# Patient Record
Sex: FEMALE | Race: WHITE | NOT HISPANIC OR LATINO | Employment: OTHER | URBAN - METROPOLITAN AREA
[De-identification: names, ages, dates, MRNs, and addresses within clinical notes are randomized per-mention and may not be internally consistent; named-entity substitution may affect disease eponyms.]

---

## 2017-01-06 ENCOUNTER — TELEPHONE (OUTPATIENT)
Dept: ENDOCRINOLOGY | Facility: CLINIC | Age: 62
End: 2017-01-06

## 2017-02-05 RX ORDER — PRAVASTATIN SODIUM 20 MG/1
TABLET ORAL
Qty: 90 TABLET | Refills: 2 | Status: SHIPPED | OUTPATIENT
Start: 2017-02-05 | End: 2017-10-24 | Stop reason: SDUPTHER

## 2017-03-02 RX ORDER — BISOPROLOL FUMARATE AND HYDROCHLOROTHIAZIDE 10; 6.25 MG/1; MG/1
TABLET ORAL
Refills: 0 | COMMUNITY
Start: 2016-12-18 | End: 2017-03-16

## 2017-03-03 ENCOUNTER — PATIENT OUTREACH (OUTPATIENT)
Dept: ADMINISTRATIVE | Facility: HOSPITAL | Age: 62
End: 2017-03-03

## 2017-03-16 ENCOUNTER — OFFICE VISIT (OUTPATIENT)
Dept: FAMILY MEDICINE | Facility: CLINIC | Age: 62
End: 2017-03-16
Attending: FAMILY MEDICINE
Payer: COMMERCIAL

## 2017-03-16 VITALS
SYSTOLIC BLOOD PRESSURE: 132 MMHG | OXYGEN SATURATION: 99 % | WEIGHT: 134.13 LBS | TEMPERATURE: 98 F | HEART RATE: 54 BPM | BODY MASS INDEX: 23.77 KG/M2 | HEIGHT: 63 IN | DIASTOLIC BLOOD PRESSURE: 80 MMHG

## 2017-03-16 DIAGNOSIS — Z86.79 HISTORY OF INTRACRANIAL HEMORRHAGE: ICD-10-CM

## 2017-03-16 DIAGNOSIS — I10 HTN (HYPERTENSION), BENIGN: ICD-10-CM

## 2017-03-16 DIAGNOSIS — Z01.419 ROUTINE GYNECOLOGICAL EXAMINATION: ICD-10-CM

## 2017-03-16 DIAGNOSIS — E55.9 VITAMIN D DEFICIENCY: ICD-10-CM

## 2017-03-16 DIAGNOSIS — Z87.81 HISTORY OF SKULL FRACTURE: ICD-10-CM

## 2017-03-16 DIAGNOSIS — E03.9 HYPOTHYROIDISM, UNSPECIFIED TYPE: ICD-10-CM

## 2017-03-16 DIAGNOSIS — M85.80 OSTEOPENIA: ICD-10-CM

## 2017-03-16 DIAGNOSIS — Z91.89 FRAMINGHAM CARDIAC RISK <10% IN NEXT 10 YEARS: ICD-10-CM

## 2017-03-16 DIAGNOSIS — E78.5 HYPERLIPIDEMIA, UNSPECIFIED HYPERLIPIDEMIA TYPE: ICD-10-CM

## 2017-03-16 DIAGNOSIS — Z00.00 ROUTINE GENERAL MEDICAL EXAMINATION AT A HEALTH CARE FACILITY: Primary | ICD-10-CM

## 2017-03-16 PROCEDURE — 3075F SYST BP GE 130 - 139MM HG: CPT | Mod: S$GLB,,, | Performed by: FAMILY MEDICINE

## 2017-03-16 PROCEDURE — 99396 PREV VISIT EST AGE 40-64: CPT | Mod: 25,S$GLB,, | Performed by: FAMILY MEDICINE

## 2017-03-16 PROCEDURE — 90715 TDAP VACCINE 7 YRS/> IM: CPT | Mod: S$GLB,,, | Performed by: FAMILY MEDICINE

## 2017-03-16 PROCEDURE — 99999 PR PBB SHADOW E&M-EST. PATIENT-LVL III: CPT | Mod: PBBFAC,,, | Performed by: FAMILY MEDICINE

## 2017-03-16 PROCEDURE — 90471 IMMUNIZATION ADMIN: CPT | Mod: S$GLB,,, | Performed by: FAMILY MEDICINE

## 2017-03-16 PROCEDURE — 3079F DIAST BP 80-89 MM HG: CPT | Mod: S$GLB,,, | Performed by: FAMILY MEDICINE

## 2017-03-16 NOTE — PROGRESS NOTES
Subjective:       Patient ID: Shira Vega is a 61 y.o. female.    Chief Complaint: Annual Exam    HPI     The patient presents to the office today requesting a routine periodic health examination.    Patient Active Problem List   Diagnosis    HTN (hypertension), benign    Hyperlipidemia    Hypothyroidism    History of skull fracture    History of intracranial hemorrhage    [place] cardiac risk <10% in next 10 years    Hypercalcemia    Vitamin D deficiency    Osteopenia       Past Surgical History:   Procedure Laterality Date    FOOT SURGERY      WISDOM TOOTH EXTRACTION           Current Outpatient Prescriptions:     aspirin (ASPIRIN LOW DOSE) 81 MG EC tablet, Take 1 tablet by mouth., Disp: , Rfl:     bisoprolol (ZEBETA) 10 MG tablet, Take 1 tablet (10 mg total) by mouth once daily., Disp: 30 tablet, Rfl: 7    cholecalciferol, vitamin D3, 1,000 unit capsule, Take 1 capsule (1,000 Units total) by mouth once daily., Disp: , Rfl: 0    levothyroxine (SYNTHROID) 50 MCG tablet, take 1 tablet by mouth every morning ON AN EMPTY STOMACH, Disp: 90 tablet, Rfl: 3    pravastatin (PRAVACHOL) 20 MG tablet, take 1 tablet by mouth once daily, Disp: 90 tablet, Rfl: 2    VIMPAT 150 mg Tab, , Disp: , Rfl: 0    bisoprolol-hydrochlorothiazide (ZIAC) 10-6.25 mg per tablet, , Disp: , Rfl: 0    Review of patient's allergies indicates:   Allergen Reactions    No known allergies        Family History   Problem Relation Age of Onset    Cancer Mother      Lung    Hypertension Mother     Thyroid disease Mother     Osteoarthritis Maternal Grandmother     Hypertension Maternal Grandmother     Cancer Maternal Grandfather      colon       Social History     Social History    Marital status:      Spouse name: N/A    Number of children: 1    Years of education: N/A     Occupational History     [employer]     retired     Social History Main Topics    Smoking status: Former Smoker     Types: Cigarettes     Smokeless tobacco: Never Used      Comment: smoked when in college, a couple cigs. when she went out    Alcohol use 3.5 oz/week     7 Standard drinks or equivalent per week      Comment: drinks glass of wine or cocktail in the evening    Drug use: No    Sexual activity: Yes     Partners: Male      Comment:      Other Topics Concern    Not on file     Social History Narrative    The patient does exercise regularly (TIW: spinning, light resistance).      Rates diet as good.      She is not satisfied with weight.     with 1 child    She does drink at least 1/2 gallon water daily.    She drinks 0 coffee/tea/caffeine-containing soft drinks daily.    Total sleep time at night is 8-10 hours.    She no longer work- retired.    She does wear seat belts.    Hobbies include none.       OB History      Para Term  AB TAB SAB Ectopic Multiple Living    1 1                Obstetric Comments    Menarche age 11.  LMP age 53.  First child born age 33.  History of abnormal PAP smear: NO.  History of abnormal mammogram: NO.  History of sexually transmitted disease:  NO    Breast Cancer Risk:  5-year Risk: Patient 2.1%, Average1.9%  Lifetime Risk: P atient 9.4%, Average 8.6%              Patient Care Team:  Justen Barragan Jr., MD as PCP - General (Family Medicine)    Review of Systems   Constitutional: Negative for fatigue and unexpected weight change.   HENT: Negative for ear discharge, ear pain, hearing loss, tinnitus and voice change.    Respiratory: Negative for cough and shortness of breath.    Cardiovascular: Negative for chest pain, palpitations and leg swelling.   Gastrointestinal: Negative for abdominal pain, blood in stool, constipation, diarrhea, nausea and vomiting.   Genitourinary: Negative for difficulty urinating, dyspareunia, dysuria, frequency and hematuria.   Musculoskeletal: Positive for arthralgias (left ankle pain/edema). Negative for back pain and myalgias.   Skin: Negative for rash.  "  Neurological: Negative for dizziness, seizures, weakness, light-headedness and headaches.   Hematological: Does not bruise/bleed easily.   Psychiatric/Behavioral: Negative for dysphoric mood and sleep disturbance. The patient is not nervous/anxious.          Objective:      Physical Exam   Constitutional: She is oriented to person, place, and time. She appears well-developed and well-nourished. She is cooperative.   HENT:   Head: Normocephalic and atraumatic.   Nose: Nose normal.   Mouth/Throat: Oropharynx is clear and moist and mucous membranes are normal.   Eyes: Conjunctivae are normal. No scleral icterus.   Neck: Neck supple. No JVD present. Carotid bruit is not present. No thyromegaly present.   Cardiovascular: Normal rate, regular rhythm, normal heart sounds and normal pulses.  Exam reveals no gallop and no friction rub.    No murmur heard.  Pulmonary/Chest: Effort normal and breath sounds normal. She has no wheezes. She has no rhonchi. She has no rales.   Abdominal: Soft. Bowel sounds are normal. She exhibits no distension and no mass. There is no splenomegaly or hepatomegaly. There is no tenderness.   Musculoskeletal: Normal range of motion. She exhibits no edema or tenderness.   Lymphadenopathy:     She has no cervical adenopathy.     She has no axillary adenopathy.   Neurological: She is alert and oriented to person, place, and time. She has normal strength and normal reflexes. No cranial nerve deficit or sensory deficit.   Skin: Skin is warm and dry.   Psychiatric: She has a normal mood and affect. Her speech is normal.   Vitals reviewed.        Assessment:       No diagnosis found.    Plan:           Discussed routine examinations and screenings.   Refer to Dr. Reyes for GYN exam.   3D mammogram (given prior exams, and need for diagnostic mammogram).   TdaP today.        "This note will not be shared with the patient."  "

## 2017-03-16 NOTE — MR AVS SNAPSHOT
[facility]  [address]  [address]  [facility] [place] [facility] [address]  Phone: [phone]                  Shira Vega   3/16/2017 8:00 AM   Office Visit    Description:  Female : 1955   Provider:  Justen Barragan Jr., MD   Department:  [facility]           Reason for Visit     Annual Exam           Diagnoses this Visit        Comments    Routine general medical examination at a health care facility    -  Primary     HTN (hypertension), benign         Hyperlipidemia, unspecified hyperlipidemia type         Hypothyroidism, unspecified type         Osteopenia         Vitamin D deficiency         [place] cardiac risk <10% in next 10 years         History of skull fracture         History of intracranial hemorrhage         Routine gynecological examination                To Do List           Future Appointments        Provider Department Dept Phone    3/22/2017 7:30 AM LAB, [facility] [phone]    2017 10:00 AM LAB, [facility] - [facility] [phone]      Goals (5 Years of Data)     None      [facility]     [facility] -  Assistance  Registered nurses in the [facility] provide clinical advisement, health education, appointment booking, and other advisory services.  Call for this free service at [phone].             Medications           Message regarding Medications     Verify the changes and/or additions to your medication regime listed below are the same as discussed with your clinician today.  If any of these changes or additions are incorrect, please notify your healthcare provider.        STOP taking these medications     bisoprolol-hydrochlorothiazide (ZIAC) 10-6.25 mg per tablet            Verify that the below list of medications is an accurate representation of the medications you are currently taking.  If none reported, the list may be blank. If incorrect, please  "contact your healthcare provider. Carry this list with you in case of emergency.           Current Medications     aspirin (ASPIRIN LOW DOSE) 81 MG EC tablet Take 1 tablet by mouth.    bisoprolol (ZEBETA) 10 MG tablet Take 1 tablet (10 mg total) by mouth once daily.    cholecalciferol, vitamin D3, 1,000 unit capsule Take 1 capsule (1,000 Units total) by mouth once daily.    levothyroxine (SYNTHROID) 50 MCG tablet take 1 tablet by mouth every morning ON AN EMPTY STOMACH    pravastatin (PRAVACHOL) 20 MG tablet take 1 tablet by mouth once daily    VIMPAT 150 mg Tab            Clinical Reference Information           Your Vitals Were     BP Pulse Temp Height Weight SpO2    132/80 (BP Location: Left arm, Patient Position: Sitting, BP Method: Manual) 54 97.8 °F (36.6 °C) (Oral) 5' 3" (1.6 m) 60.8 kg (134 lb 1.6 oz) 99%    BMI                23.75 kg/m2          Blood Pressure          Most Recent Value    BP  132/80      Allergies as of 3/16/2017     No Known Allergies      Immunizations Administered on Date of Encounter - 3/16/2017     Name Date Dose VIS Date Route    TDAP 3/16/2017 0.5 mL 2/24/2015 Intramuscular      Orders Placed During Today's Visit      Normal Orders This Visit    Ambulatory referral to Obstetrics / Gynecology     Tdap Vaccine     Future Labs/Procedures Expected by Expires    CBC auto differential  3/16/2017 3/17/2018    Comprehensive metabolic panel  3/16/2017 3/17/2018    Lipoprotein Analysis, by NMR  3/16/2017 5/15/2018    T4, free  3/16/2017 3/16/2018    TSH  3/16/2017 3/16/2018    Vitamin D  3/16/2017 3/16/2018      Language Assistance Services     ATTENTION: Language assistance services are available, free of charge. Please call [phone].      ATENCIÓN: Si habla oseiañol, tiene a garcia disposición servicios gratuitos de asistencia lingüística. Llame al [phone].     CHÚ Ý: N?u b?n nói Ti?ng Vi?t, có các d?ch v? h? tr? ngôn ng? mi?n phí dành cho b?n. G?i s? [phone].         [facility] complies with applicable Federal civil rights laws and does not discriminate on the basis of race, color, national origin, age, disability, or sex.
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
## 2017-03-16 NOTE — PROGRESS NOTES
Patient was given Tdap IM  in Left  Deltoid as per orders from Dr. Barragan. Aseptic tech used and pt tolerated well. Pt was monitored for 15 mins with no reaction noted.

## 2017-03-22 ENCOUNTER — LAB VISIT (OUTPATIENT)
Dept: LAB | Facility: HOSPITAL | Age: 62
End: 2017-03-22
Attending: FAMILY MEDICINE
Payer: COMMERCIAL

## 2017-03-22 DIAGNOSIS — Z91.89 FRAMINGHAM CARDIAC RISK <10% IN NEXT 10 YEARS: ICD-10-CM

## 2017-03-22 DIAGNOSIS — I10 HTN (HYPERTENSION), BENIGN: ICD-10-CM

## 2017-03-22 DIAGNOSIS — E55.9 VITAMIN D DEFICIENCY: ICD-10-CM

## 2017-03-22 DIAGNOSIS — Z87.81 HISTORY OF SKULL FRACTURE: ICD-10-CM

## 2017-03-22 DIAGNOSIS — E03.9 HYPOTHYROIDISM, UNSPECIFIED TYPE: ICD-10-CM

## 2017-03-22 DIAGNOSIS — E78.5 HYPERLIPIDEMIA, UNSPECIFIED HYPERLIPIDEMIA TYPE: ICD-10-CM

## 2017-03-22 DIAGNOSIS — M85.80 OSTEOPENIA: ICD-10-CM

## 2017-03-22 DIAGNOSIS — Z86.79 HISTORY OF INTRACRANIAL HEMORRHAGE: ICD-10-CM

## 2017-03-22 LAB
25(OH)D3+25(OH)D2 SERPL-MCNC: 63 NG/ML
ALBUMIN SERPL BCP-MCNC: 3.8 G/DL
ALP SERPL-CCNC: 92 U/L
ALT SERPL W/O P-5'-P-CCNC: 12 U/L
ANION GAP SERPL CALC-SCNC: 11 MMOL/L
AST SERPL-CCNC: 22 U/L
BASOPHILS # BLD AUTO: 0.06 K/UL
BASOPHILS NFR BLD: 0.9 %
BILIRUB SERPL-MCNC: 0.4 MG/DL
BUN SERPL-MCNC: 17 MG/DL
CALCIUM SERPL-MCNC: 10.2 MG/DL
CHLORIDE SERPL-SCNC: 106 MMOL/L
CO2 SERPL-SCNC: 26 MMOL/L
CREAT SERPL-MCNC: 0.9 MG/DL
DIFFERENTIAL METHOD: ABNORMAL
EOSINOPHIL # BLD AUTO: 0.3 K/UL
EOSINOPHIL NFR BLD: 5.1 %
ERYTHROCYTE [DISTWIDTH] IN BLOOD BY AUTOMATED COUNT: 13.1 %
EST. GFR  (AFRICAN AMERICAN): >60 ML/MIN/1.73 M^2
EST. GFR  (NON AFRICAN AMERICAN): >60 ML/MIN/1.73 M^2
GLUCOSE SERPL-MCNC: 78 MG/DL
HCT VFR BLD AUTO: 40.1 %
HGB BLD-MCNC: 12.9 G/DL
LYMPHOCYTES # BLD AUTO: 2.2 K/UL
LYMPHOCYTES NFR BLD: 33.5 %
MCH RBC QN AUTO: 31.9 PG
MCHC RBC AUTO-ENTMCNC: 32.2 %
MCV RBC AUTO: 99 FL
MONOCYTES # BLD AUTO: 0.7 K/UL
MONOCYTES NFR BLD: 11.1 %
NEUTROPHILS # BLD AUTO: 3.3 K/UL
NEUTROPHILS NFR BLD: 49.2 %
PLATELET # BLD AUTO: 217 K/UL
PMV BLD AUTO: 10.2 FL
POTASSIUM SERPL-SCNC: 4 MMOL/L
PROT SERPL-MCNC: 7.9 G/DL
RBC # BLD AUTO: 4.04 M/UL
SODIUM SERPL-SCNC: 143 MMOL/L
T4 FREE SERPL-MCNC: 0.94 NG/DL
TSH SERPL DL<=0.005 MIU/L-ACNC: 1.53 UIU/ML
WBC # BLD AUTO: 6.66 K/UL

## 2017-03-22 PROCEDURE — 84439 ASSAY OF FREE THYROXINE: CPT

## 2017-03-22 PROCEDURE — 85025 COMPLETE CBC W/AUTO DIFF WBC: CPT

## 2017-03-22 PROCEDURE — 84443 ASSAY THYROID STIM HORMONE: CPT

## 2017-03-22 PROCEDURE — 80061 LIPID PANEL: CPT

## 2017-03-22 PROCEDURE — 36415 COLL VENOUS BLD VENIPUNCTURE: CPT | Mod: PO

## 2017-03-22 PROCEDURE — 82306 VITAMIN D 25 HYDROXY: CPT

## 2017-03-22 PROCEDURE — 80053 COMPREHEN METABOLIC PANEL: CPT

## 2017-03-26 ENCOUNTER — PATIENT MESSAGE (OUTPATIENT)
Dept: FAMILY MEDICINE | Facility: CLINIC | Age: 62
End: 2017-03-26

## 2017-03-26 LAB
CHOLEST SERPL-MCNC: 224 MG/DL (ref 100–199)
HDL SERPL QN: 60 NM
HDL SERPL QN: 9.4 NM
HDL SERPL-SCNC: 41.2 UMOL/L
HDLC SERPL-MCNC: 60 MG/DL
HLD.LARGE SERPL-SCNC: 7.7 UMOL/L
LDL MED SERPL QN: 21.8 NM
LDL SERPL QN: 21.8 NM
LDL SERPL-SCNC: 1351 NMOL/L
LDL SMALL SERPL-SCNC: 411 NMOL/L
LDL SMALL SERPL-SCNC: 411 NMOL/L
LDLC SERPL CALC-MCNC: 129 MG/DL (ref 0–99)
TRIGL SERPL-MCNC: 176 MG/DL (ref 0–149)
VLDL LARGE SERPL-SCNC: 8.7 NMOL/L
VLDL SERPL QN: 61.6 NM

## 2017-03-27 ENCOUNTER — PATIENT MESSAGE (OUTPATIENT)
Dept: FAMILY MEDICINE | Facility: CLINIC | Age: 62
End: 2017-03-27

## 2017-05-12 ENCOUNTER — OFFICE VISIT (OUTPATIENT)
Dept: OBSTETRICS AND GYNECOLOGY | Facility: CLINIC | Age: 62
End: 2017-05-12
Payer: COMMERCIAL

## 2017-05-12 VITALS
BODY MASS INDEX: 23.6 KG/M2 | SYSTOLIC BLOOD PRESSURE: 130 MMHG | WEIGHT: 133.19 LBS | DIASTOLIC BLOOD PRESSURE: 70 MMHG | HEIGHT: 63 IN

## 2017-05-12 DIAGNOSIS — Z12.4 PAP SMEAR FOR CERVICAL CANCER SCREENING: ICD-10-CM

## 2017-05-12 DIAGNOSIS — Z01.419 ENCOUNTER FOR GYNECOLOGICAL EXAMINATION: Primary | ICD-10-CM

## 2017-05-12 PROCEDURE — 3075F SYST BP GE 130 - 139MM HG: CPT | Mod: S$GLB,,, | Performed by: OBSTETRICS & GYNECOLOGY

## 2017-05-12 PROCEDURE — 3078F DIAST BP <80 MM HG: CPT | Mod: S$GLB,,, | Performed by: OBSTETRICS & GYNECOLOGY

## 2017-05-12 PROCEDURE — 88175 CYTOPATH C/V AUTO FLUID REDO: CPT

## 2017-05-12 PROCEDURE — 99386 PREV VISIT NEW AGE 40-64: CPT | Mod: S$GLB,,, | Performed by: OBSTETRICS & GYNECOLOGY

## 2017-05-12 PROCEDURE — 99999 PR PBB SHADOW E&M-EST. PATIENT-LVL III: CPT | Mod: PBBFAC,,, | Performed by: OBSTETRICS & GYNECOLOGY

## 2017-05-12 NOTE — PROGRESS NOTES
HPI: Pt is a 61 y.o.  female who presents for routine annual exam. Last pap in 2013 NORMAL.     MMG in 4/2016 NORMAL.     ROS:  GENERAL: Feeling well overall. Denies fever or chills.   SKIN: Denies rash or lesions.   HEAD: Denies head injury or headache.   NODES: Denies enlarged lymph nodes.   CHEST: Denies chest pain or shortness of breath.   CARDIOVASCULAR: Denies palpitations or left sided chest pain.   ABDOMEN: No abdominal pain, constipation, diarrhea, nausea, vomiting or rectal bleeding.   URINARY: No dysuria, hematuria, or burning on urination.  REPRODUCTIVE: See HPI.   BREASTS: Denies pain, lumps, or nipple discharge.   HEMATOLOGIC: No easy bruisability or excessive bleeding.   MUSCULOSKELETAL: Denies joint pain or swelling.   NEUROLOGIC: Denies syncope or weakness.   PSYCHIATRIC: Denies depression, anxiety or mood swings.    PE:   APPEARANCE: Well nourished, well developed, White female in no acute distress.  NODES: no cervical, supraclavicular, or inguinal lymphadenopathy  BREASTS: Symmetrical, no skin changes or visible lesions. No palpable masses, nipple discharge or adenopathy bilaterally.  ABDOMEN: Soft. No tenderness or masses. No distention. No hernias palpated. No CVA tenderness.  VULVA: No lesions. Normal external female genitalia.  URETHRAL MEATUS: Normal size and location, no lesions, no prolapse.  URETHRA: No masses, tenderness, or prolapse.  VAGINA: Moist. No lesions or lacerations noted. No abnormal discharge present. No odor present.   CERVIX: No lesions or discharge. No cervical motion tenderness.   UTERUS: Normal size, regular shape, mobile, non-tender.  ADNEXA: No tenderness. No fullness or masses palpated in the adnexal regions.   ANUS PERINEUM: Normal.      Diagnosis:  1. Encounter for gynecological examination    2. Pap smear for cervical cancer screening        Plan:     Orders Placed This Encounter    Liquid-based pap smear, screening       Patient was counseled today on the new ACS  guidelines for cervical cytology screening as well as the current recommendations for breast cancer screening. She was counseled to follow up with her PCP for other routine health maintenance. Counseling session lasted approximately 10 minutes, and all her questions were answered.    Follow-up with me in 1 year for routine exam; pap in 3 years.

## 2017-05-12 NOTE — MR AVS SNAPSHOT
[facility]  [address], [address]  [place] [address]  Phone: [phone]  Fax: [phone]                  Shira Vega   2017 8:45 AM   Office Visit    Description:  Female : 1955   Provider:  Ritu Reyes MD   Department:  [facility]           Reason for Visit     Well Woman                To Do List           Future Appointments        Provider Department Dept Phone    2017 10:00 AM LAB, [place] [facility] [phone]      Goals (5 Years of Data)     None      [facility]     [facility] Care Line -  Assistance  Unless otherwise directed by your provider, please contact [facility], our nurse care line that is available for  assistance.     Registered nurses in the [facility] provide: appointment scheduling, clinical advisement, health education, and other advisory services.  Call: [phone] (toll free)               Medications           Message regarding Medications     Verify the changes and/or additions to your medication regime listed below are the same as discussed with your clinician today.  If any of these changes or additions are incorrect, please notify your healthcare provider.             Verify that the below list of medications is an accurate representation of the medications you are currently taking.  If none reported, the list may be blank. If incorrect, please contact your healthcare provider. Carry this list with you in case of emergency.           Current Medications     aspirin (ASPIRIN LOW DOSE) 81 MG EC tablet Take 1 tablet by mouth.    bisoprolol (ZEBETA) 10 MG tablet Take 1 tablet (10 mg total) by mouth once daily.    cholecalciferol, vitamin D3, 1,000 unit capsule Take 1 capsule (1,000 Units total) by mouth once daily.    levothyroxine (SYNTHROID) 50 MCG tablet take 1 tablet by mouth every morning ON AN EMPTY STOMACH    pravastatin (PRAVACHOL) 20 MG tablet take 1  "tablet by mouth once daily    VIMPAT 150 mg Tab            Clinical Reference Information           Your Vitals Were     BP Height Weight BMI       130/70 5' 3" (1.6 m) 60.4 kg (133 lb 2.5 oz) 23.59 kg/m2       Blood Pressure          Most Recent Value    BP  130/70      Allergies as of 5/12/2017     No Known Allergies      Immunizations Administered on Date of Encounter - 5/12/2017     None      Language Assistance Services     ATTENTION: Language assistance services are available, free of charge. Please call [phone].      ATENCIÓN: Si habla español, tiene a garcia disposición servicios gratuitos de asistencia lingüística. Llame al [phone].     YOLA Ý: N?u b?n nói Ti?ng Vi?t, có các d?ch v? h? tr? ngôn ng? mi?n phí dành cho b?n. G?i s? [phone].         [facility] complies with applicable Federal civil rights laws and does not discriminate on the basis of race, color, national origin, age, disability, or sex.        "

## 2017-06-27 ENCOUNTER — LAB VISIT (OUTPATIENT)
Dept: LAB | Facility: HOSPITAL | Age: 62
End: 2017-06-27
Attending: INTERNAL MEDICINE
Payer: COMMERCIAL

## 2017-06-27 DIAGNOSIS — E83.52 HYPERCALCEMIA: ICD-10-CM

## 2017-06-27 LAB
ALBUMIN SERPL BCP-MCNC: 3.8 G/DL
ANION GAP SERPL CALC-SCNC: 7 MMOL/L
BUN SERPL-MCNC: 15 MG/DL
CALCIUM SERPL-MCNC: 10.7 MG/DL
CHLORIDE SERPL-SCNC: 106 MMOL/L
CO2 SERPL-SCNC: 28 MMOL/L
CREAT SERPL-MCNC: 0.9 MG/DL
EST. GFR  (AFRICAN AMERICAN): >60 ML/MIN/1.73 M^2
EST. GFR  (NON AFRICAN AMERICAN): >60 ML/MIN/1.73 M^2
GLUCOSE SERPL-MCNC: 90 MG/DL
PHOSPHATE SERPL-MCNC: 2.8 MG/DL
POTASSIUM SERPL-SCNC: 4.4 MMOL/L
PTH-INTACT SERPL-MCNC: 62 PG/ML
SODIUM SERPL-SCNC: 141 MMOL/L

## 2017-06-27 PROCEDURE — 83970 ASSAY OF PARATHORMONE: CPT

## 2017-06-27 PROCEDURE — 80069 RENAL FUNCTION PANEL: CPT

## 2017-06-27 PROCEDURE — 36415 COLL VENOUS BLD VENIPUNCTURE: CPT | Mod: PO

## 2017-07-10 DIAGNOSIS — Z12.11 COLON CANCER SCREENING: ICD-10-CM

## 2017-07-11 RX ORDER — BISOPROLOL FUMARATE 10 MG/1
TABLET, FILM COATED ORAL
Qty: 30 TABLET | Refills: 0 | Status: SHIPPED | OUTPATIENT
Start: 2017-07-11 | End: 2017-09-07 | Stop reason: SDUPTHER

## 2017-09-06 RX ORDER — BISOPROLOL FUMARATE 10 MG/1
10 TABLET, FILM COATED ORAL DAILY
Qty: 30 TABLET | Refills: 0 | Status: CANCELLED | OUTPATIENT
Start: 2017-09-06

## 2017-09-06 NOTE — TELEPHONE ENCOUNTER
----- Message from Fernanda Bautista sent at 9/6/2017 11:43 AM CDT -----  Contact: Self [phone]  ..Refill request.  bisoprolol (ZEBETA) 10 MG tablet     ..  [facility] [place] [address]. - [place], [place] - [address] [place] [address]  Phone: [phone] Fax: [phone]

## 2017-09-07 RX ORDER — BISOPROLOL FUMARATE 10 MG/1
10 TABLET, FILM COATED ORAL DAILY
Qty: 30 TABLET | Refills: 0 | Status: SHIPPED | OUTPATIENT
Start: 2017-09-07 | End: 2017-10-15 | Stop reason: SDUPTHER

## 2017-10-13 DIAGNOSIS — Z12.11 COLON CANCER SCREENING: ICD-10-CM

## 2017-10-13 RX ORDER — LEVOTHYROXINE SODIUM 50 UG/1
TABLET ORAL
Qty: 90 TABLET | Refills: 1 | Status: SHIPPED | OUTPATIENT
Start: 2017-10-13 | End: 2018-04-10 | Stop reason: SDUPTHER

## 2017-10-16 RX ORDER — BISOPROLOL FUMARATE 10 MG/1
TABLET, FILM COATED ORAL
Qty: 30 TABLET | Refills: 0 | Status: SHIPPED | OUTPATIENT
Start: 2017-10-16 | End: 2017-10-26 | Stop reason: SDUPTHER

## 2017-10-23 ENCOUNTER — TELEPHONE (OUTPATIENT)
Dept: ENDOCRINOLOGY | Facility: CLINIC | Age: 62
End: 2017-10-23

## 2017-10-23 DIAGNOSIS — R94.7 NONSPECIFIC ABNORMAL RESULTS OF ENDOCRINE FUNCTION STUDY: Primary | ICD-10-CM

## 2017-10-23 NOTE — TELEPHONE ENCOUNTER
----- Message from Fernanda Bautista sent at 10/23/2017  2:19 PM CDT -----  Contact: Self [phone]  PT's pharmacist told her she needs to schedule an appointment before she can have her next refill  for bisoprolol (ZEBETA) 10 MG tablet. The PT wants to know if she needs labs done to check her calcium.

## 2017-10-24 RX ORDER — PRAVASTATIN SODIUM 20 MG/1
TABLET ORAL
Qty: 90 TABLET | Refills: 1 | Status: SHIPPED | OUTPATIENT
Start: 2017-10-24 | End: 2018-04-10 | Stop reason: SDUPTHER

## 2017-10-26 RX ORDER — BISOPROLOL FUMARATE 10 MG/1
10 TABLET, FILM COATED ORAL DAILY
Qty: 30 TABLET | Refills: 0 | Status: SHIPPED | OUTPATIENT
Start: 2017-10-26 | End: 2017-12-14 | Stop reason: SDUPTHER

## 2017-10-31 ENCOUNTER — LAB VISIT (OUTPATIENT)
Dept: LAB | Facility: HOSPITAL | Age: 62
End: 2017-10-31
Attending: INTERNAL MEDICINE
Payer: COMMERCIAL

## 2017-10-31 DIAGNOSIS — R94.7 NONSPECIFIC ABNORMAL RESULTS OF ENDOCRINE FUNCTION STUDY: ICD-10-CM

## 2017-10-31 DIAGNOSIS — M85.80 OSTEOPENIA, UNSPECIFIED LOCATION: Primary | ICD-10-CM

## 2017-10-31 LAB
25(OH)D3+25(OH)D2 SERPL-MCNC: 53 NG/ML
ALBUMIN SERPL BCP-MCNC: 3.8 G/DL
ANION GAP SERPL CALC-SCNC: 10 MMOL/L
BUN SERPL-MCNC: 16 MG/DL
CALCIUM SERPL-MCNC: 10.4 MG/DL
CHLORIDE SERPL-SCNC: 106 MMOL/L
CO2 SERPL-SCNC: 26 MMOL/L
CREAT SERPL-MCNC: 0.9 MG/DL
EST. GFR  (AFRICAN AMERICAN): >60 ML/MIN/1.73 M^2
EST. GFR  (NON AFRICAN AMERICAN): >60 ML/MIN/1.73 M^2
GLUCOSE SERPL-MCNC: 81 MG/DL
PHOSPHATE SERPL-MCNC: 3.5 MG/DL
POTASSIUM SERPL-SCNC: 4.2 MMOL/L
PTH-INTACT SERPL-MCNC: 85 PG/ML
SODIUM SERPL-SCNC: 142 MMOL/L

## 2017-10-31 PROCEDURE — 83970 ASSAY OF PARATHORMONE: CPT

## 2017-10-31 PROCEDURE — 82306 VITAMIN D 25 HYDROXY: CPT

## 2017-10-31 PROCEDURE — 80069 RENAL FUNCTION PANEL: CPT

## 2017-10-31 PROCEDURE — 36415 COLL VENOUS BLD VENIPUNCTURE: CPT | Mod: PO

## 2017-10-31 NOTE — PROGRESS NOTES
Results for orders placed or performed in visit on 10/31/17   Renal function panel   Result Value Ref Range    Glucose 81 70 - 110 mg/dL    Sodium 142 136 - 145 mmol/L    Potassium 4.2 3.5 - 5.1 mmol/L    Chloride 106 95 - 110 mmol/L    CO2 26 23 - 29 mmol/L    BUN, Bld 16 8 - 23 mg/dL    Calcium 10.4 8.7 - 10.5 mg/dL    Creatinine 0.9 0.5 - 1.4 mg/dL    Albumin 3.8 3.5 - 5.2 g/dL    Phosphorus 3.5 2.7 - 4.5 mg/dL    eGFR if African American >60.0 >60 mL/min/1.73 m^2    eGFR if non African American >60.0 >60 mL/min/1.73 m^2    Anion Gap 10 8 - 16 mmol/L   Vitamin D   Result Value Ref Range    Vit D, 25-Hydroxy 53 30 - 96 ng/mL   PTH, intact   Result Value Ref Range    PTH, Intact 85.0 (H) 9.0 - 77.0 pg/mL     Repeat PTH, renal function in 6MOs  May need 24 h urine for ca/creat  Needs repeat bone density 4/2018  F/u one week later with me

## 2017-11-02 ENCOUNTER — TELEPHONE (OUTPATIENT)
Dept: ENDOCRINOLOGY | Facility: CLINIC | Age: 62
End: 2017-11-02

## 2017-11-02 NOTE — TELEPHONE ENCOUNTER
Recall has been put in.    ----- Message from Yaa Esposito MD sent at 10/31/2017  5:05 PM CDT -----  kabs and bone density and f/u with me 4/2018

## 2017-12-14 NOTE — TELEPHONE ENCOUNTER
----- Message from Fernanda Bautista sent at 12/14/2017  3:13 PM CST -----  Contact: Self [phone]  ...Refill request.  bisoprolol (ZEBETA) 10 MG tablet    ..  [facility] [place] [address]. - [place], [place] - [address] [place] [id number]  Phone: [phone] Fax: [phone]

## 2017-12-15 RX ORDER — BISOPROLOL FUMARATE 10 MG/1
10 TABLET, FILM COATED ORAL DAILY
Qty: 30 TABLET | Refills: 6 | Status: SHIPPED | OUTPATIENT
Start: 2017-12-15 | End: 2018-07-02 | Stop reason: SDUPTHER

## 2018-01-04 ENCOUNTER — HOSPITAL ENCOUNTER (OUTPATIENT)
Dept: RADIOLOGY | Facility: HOSPITAL | Age: 63
Discharge: HOME | End: 2018-01-04
Attending: FAMILY MEDICINE
Payer: COMMERCIAL

## 2018-01-04 ENCOUNTER — PATIENT MESSAGE (OUTPATIENT)
Dept: FAMILY MEDICINE | Facility: CLINIC | Age: 63
End: 2018-01-04

## 2018-01-04 ENCOUNTER — OFFICE VISIT (OUTPATIENT)
Dept: FAMILY MEDICINE | Facility: CLINIC | Age: 63
End: 2018-01-04
Attending: FAMILY MEDICINE
Payer: COMMERCIAL

## 2018-01-04 VITALS
WEIGHT: 135.13 LBS | SYSTOLIC BLOOD PRESSURE: 130 MMHG | DIASTOLIC BLOOD PRESSURE: 72 MMHG | BODY MASS INDEX: 23.94 KG/M2 | HEIGHT: 63 IN | OXYGEN SATURATION: 98 % | HEART RATE: 55 BPM

## 2018-01-04 DIAGNOSIS — S00.83XS CONTUSION OF FACE, SEQUELA: ICD-10-CM

## 2018-01-04 DIAGNOSIS — S00.83XS CONTUSION OF FACE, SEQUELA: Primary | ICD-10-CM

## 2018-01-04 DIAGNOSIS — S09.93XS FACIAL TRAUMA, SEQUELA: Primary | ICD-10-CM

## 2018-01-04 DIAGNOSIS — Q67.0 FACIAL ASYMMETRY: ICD-10-CM

## 2018-01-04 PROCEDURE — 99213 OFFICE O/P EST LOW 20 MIN: CPT | Mod: S$GLB,,, | Performed by: FAMILY MEDICINE

## 2018-01-04 PROCEDURE — 99999 PR PBB SHADOW E&M-EST. PATIENT-LVL III: CPT | Mod: PBBFAC,,, | Performed by: FAMILY MEDICINE

## 2018-01-04 PROCEDURE — 70150 X-RAY EXAM OF FACIAL BONES: CPT | Mod: TC,PO

## 2018-01-04 PROCEDURE — 70150 X-RAY EXAM OF FACIAL BONES: CPT | Mod: 26,,, | Performed by: RADIOLOGY
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
## 2018-01-05 ENCOUNTER — HOSPITAL ENCOUNTER (OUTPATIENT)
Dept: RADIOLOGY | Facility: HOSPITAL | Age: 63
Discharge: HOME | End: 2018-01-05
Attending: FAMILY MEDICINE
Payer: COMMERCIAL

## 2018-01-05 DIAGNOSIS — S09.93XS FACIAL TRAUMA, SEQUELA: ICD-10-CM

## 2018-01-05 PROCEDURE — 70486 CT MAXILLOFACIAL W/O DYE: CPT | Mod: 26,,, | Performed by: RADIOLOGY

## 2018-01-05 PROCEDURE — 70486 CT MAXILLOFACIAL W/O DYE: CPT | Mod: TC

## 2018-01-08 ENCOUNTER — PATIENT MESSAGE (OUTPATIENT)
Dept: FAMILY MEDICINE | Facility: CLINIC | Age: 63
End: 2018-01-08

## 2018-01-09 ENCOUNTER — PATIENT MESSAGE (OUTPATIENT)
Dept: FAMILY MEDICINE | Facility: CLINIC | Age: 63
End: 2018-01-09

## 2018-01-10 NOTE — TELEPHONE ENCOUNTER
Please assist patient with scheduling an appt with Dr. Macdonald.  If you are unable to schedule please provide patient with contact info to schedule appt on her own. thanks

## 2018-01-22 ENCOUNTER — PATIENT MESSAGE (OUTPATIENT)
Dept: FAMILY MEDICINE | Facility: CLINIC | Age: 63
End: 2018-01-22

## 2018-01-29 ENCOUNTER — OFFICE VISIT (OUTPATIENT)
Dept: OTOLARYNGOLOGY | Facility: CLINIC | Age: 63
End: 2018-01-29
Payer: COMMERCIAL

## 2018-01-29 VITALS
DIASTOLIC BLOOD PRESSURE: 75 MMHG | WEIGHT: 135 LBS | HEART RATE: 58 BPM | TEMPERATURE: 98 F | SYSTOLIC BLOOD PRESSURE: 145 MMHG | HEIGHT: 63 IN | BODY MASS INDEX: 23.92 KG/M2

## 2018-01-29 DIAGNOSIS — R51.9 NONINTRACTABLE EPISODIC HEADACHE, UNSPECIFIED HEADACHE TYPE: ICD-10-CM

## 2018-01-29 DIAGNOSIS — R13.10 DYSPHAGIA, UNSPECIFIED TYPE: ICD-10-CM

## 2018-01-29 DIAGNOSIS — J30.9 ALLERGIC RHINITIS, UNSPECIFIED CHRONICITY, UNSPECIFIED SEASONALITY, UNSPECIFIED TRIGGER: ICD-10-CM

## 2018-01-29 DIAGNOSIS — K21.9 LARYNGOPHARYNGEAL REFLUX (LPR): ICD-10-CM

## 2018-01-29 DIAGNOSIS — R09.82 PND (POST-NASAL DRIP): ICD-10-CM

## 2018-01-29 DIAGNOSIS — S00.93XA CONTUSION OF HEAD, UNSPECIFIED PART OF HEAD, INITIAL ENCOUNTER: Primary | ICD-10-CM

## 2018-01-29 DIAGNOSIS — J34.2 NASAL SEPTAL DEVIATION: ICD-10-CM

## 2018-01-29 DIAGNOSIS — H61.21 IMPACTED CERUMEN OF RIGHT EAR: ICD-10-CM

## 2018-01-29 PROCEDURE — 99205 OFFICE O/P NEW HI 60 MIN: CPT | Mod: 25,S$GLB,, | Performed by: SPECIALIST

## 2018-01-29 PROCEDURE — 31575 DIAGNOSTIC LARYNGOSCOPY: CPT | Mod: S$GLB,,, | Performed by: SPECIALIST

## 2018-01-29 PROCEDURE — 69210 REMOVE IMPACTED EAR WAX UNI: CPT | Mod: 51,S$GLB,, | Performed by: SPECIALIST

## 2018-01-29 RX ORDER — PANTOPRAZOLE SODIUM 40 MG/1
40 TABLET, DELAYED RELEASE ORAL 2 TIMES DAILY
Qty: 60 TABLET | Refills: 11 | Status: SHIPPED | OUTPATIENT
Start: 2018-01-29 | End: 2018-06-01 | Stop reason: ALTCHOICE
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
## 2018-01-29 NOTE — PROCEDURES
"Ear Cerumen Removal  Date/Time: 1/29/2018 2:00 PM  Performed by: ДМИТРИЙ THOMPSON  Authorized by: ДМИТРИЙ THOMPSON     Time out: Immediately prior to procedure a "time out" was called to verify the correct patient, procedure, equipment, support staff and site/side marked as required.    Consent Done?:  Yes (Verbal)    Local anesthetic:  None  Location details:  Right ear  Procedure type: curette    Cerumen  Removal Results:  Cerumen completely removed  Patient tolerance:  Patient tolerated the procedure well with no immediate complications  Laryngoscopy  Date/Time: 1/29/2018 2:01 PM  Performed by: ДМИТРИЙ THOMPSON  Authorized by: ДМИТРИЙ THOMPSON     Time out: Immediately prior to procedure a "time out" was called to verify the correct patient, procedure, equipment, support staff and site/side marked as required.    Consent Done?:  Yes (Verbal)  Anesthesia:     Local anesthetic:  Lidocaine 2% and Juan-Synephrine 1/2% (Topical)    Patient tolerance:  Patient tolerated the procedure well with no immediate complications    Decongestion performed?: Yes    Laryngoscopy:     Laryngoscopy areas: Nasal cavities, nasopharynx, oropharynx, hypopharynx, larynx, vocal cords.    Laryngoscope size: 4 mm flexible nasopharyngoscope with video.  Nose External:      No external nasal deformity  Nose Intranasal:      Mucosa no polyps     Mucosa ulcers not present     No mucosa lesions present ( clear mucus in both nasal passages)     Septum gross deformity (septum deviated to the left with 90% obstruction of the left nasal passage)     Enlarged turbinates (bilaterally)  Nasopharynx:      No mucosa lesions     Adenoids not present     Posterior choanae patent     Eustachian tube patent  Larynx/hypopharynx:      No epiglottis lesions     No epiglottis edema     No AE folds lesions     No vocal cord polyps     Equal and normal bilateral (vocal cords move symmetrically and are without mucosal lesions)     No hypopharynx lesions     No " piriform sinus pooling     No piriform sinus lesions     Post cricoid edema ( moderate in degree)     Post cricoid erythema (moderate in degree)

## 2018-01-29 NOTE — PATIENT INSTRUCTIONS
How Acid Reflux Affects Your Throat    Do you have to clear your throat or cough often? Are you hoarse? Do you have trouble swallowing? If you have these or other throat symptoms, you may have acid reflux. This occurs when stomach acid flows back up and irritates your throat.  Why you have throat symptoms  There are muscles (esophageal sphincters) at both ends of the tube that carries food to your stomach (the esophagus). These muscles relax to let food pass. Then they tighten to keep stomach acid down. When the lower esophageal sphincter (LES) doesnt tighten enough, acid can flow back (reflux) from your stomach into your esophagus. This may cause heartburn. In some cases the upper esophageal sphincter (UES) also doesnt work well. Then acid can travel higher and enter your throat (pharynx). In many cases, this causes throat symptoms.  Common throat symptoms  · Need to clear your throat often  · Feeling like youre choking  · Long-term (chronic) cough  · Hoarseness  · Trouble swallowing  · Feel like you have a lump in your throat  · Sour or acid taste  · Sore throat that keeps coming back   Date Last Reviewed: 7/1/2016  © 4553-3653 [employer]. [address] [place] [address], [place] [address]. All rights reserved. This information is not intended as a substitute for professional medical care. Always follow your healthcare professional's instructions.

## 2018-02-12 ENCOUNTER — TELEPHONE (OUTPATIENT)
Dept: OTOLARYNGOLOGY | Facility: CLINIC | Age: 63
End: 2018-02-12

## 2018-02-12 ENCOUNTER — OFFICE VISIT (OUTPATIENT)
Dept: OTOLARYNGOLOGY | Facility: CLINIC | Age: 63
End: 2018-02-12
Payer: COMMERCIAL

## 2018-02-12 VITALS
SYSTOLIC BLOOD PRESSURE: 151 MMHG | HEIGHT: 63 IN | DIASTOLIC BLOOD PRESSURE: 85 MMHG | TEMPERATURE: 98 F | HEART RATE: 56 BPM | BODY MASS INDEX: 23.92 KG/M2 | WEIGHT: 135 LBS

## 2018-02-12 DIAGNOSIS — R13.10 DYSPHAGIA, UNSPECIFIED TYPE: ICD-10-CM

## 2018-02-12 DIAGNOSIS — R51.9 NONINTRACTABLE EPISODIC HEADACHE, UNSPECIFIED HEADACHE TYPE: ICD-10-CM

## 2018-02-12 DIAGNOSIS — J34.2 NASAL SEPTAL DEVIATION: ICD-10-CM

## 2018-02-12 DIAGNOSIS — K21.9 LARYNGOPHARYNGEAL REFLUX (LPR): ICD-10-CM

## 2018-02-12 DIAGNOSIS — J30.9 ALLERGIC RHINITIS, UNSPECIFIED CHRONICITY, UNSPECIFIED SEASONALITY, UNSPECIFIED TRIGGER: ICD-10-CM

## 2018-02-12 DIAGNOSIS — S00.83XD CONTUSION OF OTHER PART OF HEAD, SUBSEQUENT ENCOUNTER: Primary | ICD-10-CM

## 2018-02-12 DIAGNOSIS — R09.82 PND (POST-NASAL DRIP): ICD-10-CM

## 2018-02-12 PROBLEM — S00.93XA HEAD CONTUSION: Status: ACTIVE | Noted: 2018-02-12

## 2018-02-12 PROCEDURE — 3008F BODY MASS INDEX DOCD: CPT | Mod: S$GLB,,, | Performed by: SPECIALIST

## 2018-02-12 PROCEDURE — 99214 OFFICE O/P EST MOD 30 MIN: CPT | Mod: S$GLB,,, | Performed by: SPECIALIST
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
## 2018-02-12 NOTE — TELEPHONE ENCOUNTER
----- Message from Chirag Viveros sent at 2/9/2018 11:40 AM CST -----  Contact: patient  x_ 1st Request   _ 2nd Request   _ 3rd Request     Who: BALWINDER URBANO [[id number]]    Why: patient called is requesting a call back to find out if you have filled out any paperwork for her Workers Compensation Claim.    What Number to Call Back: [phone]    When to Expect a call back: (Before the end of the day)   -- if call after 3:00 call back will be tomorrow.

## 2018-03-27 ENCOUNTER — OFFICE VISIT (OUTPATIENT)
Dept: OTOLARYNGOLOGY | Facility: CLINIC | Age: 63
End: 2018-03-27
Payer: COMMERCIAL

## 2018-03-27 VITALS
HEART RATE: 56 BPM | TEMPERATURE: 99 F | DIASTOLIC BLOOD PRESSURE: 77 MMHG | HEIGHT: 63 IN | BODY MASS INDEX: 23.92 KG/M2 | SYSTOLIC BLOOD PRESSURE: 149 MMHG | WEIGHT: 135 LBS

## 2018-03-27 DIAGNOSIS — J34.2 NASAL SEPTAL DEVIATION: ICD-10-CM

## 2018-03-27 DIAGNOSIS — K21.9 LARYNGOPHARYNGEAL REFLUX (LPR): Primary | ICD-10-CM

## 2018-03-27 DIAGNOSIS — S00.83XD CONTUSION OF OTHER PART OF HEAD, SUBSEQUENT ENCOUNTER: ICD-10-CM

## 2018-03-27 DIAGNOSIS — J30.9 ALLERGIC RHINITIS, UNSPECIFIED CHRONICITY, UNSPECIFIED SEASONALITY, UNSPECIFIED TRIGGER: ICD-10-CM

## 2018-03-27 DIAGNOSIS — R13.10 DYSPHAGIA, UNSPECIFIED TYPE: ICD-10-CM

## 2018-03-27 PROCEDURE — 31575 DIAGNOSTIC LARYNGOSCOPY: CPT | Mod: S$GLB,,, | Performed by: SPECIALIST

## 2018-03-27 PROCEDURE — 3077F SYST BP >= 140 MM HG: CPT | Mod: CPTII,S$GLB,, | Performed by: SPECIALIST

## 2018-03-27 PROCEDURE — 99214 OFFICE O/P EST MOD 30 MIN: CPT | Mod: 25,S$GLB,, | Performed by: SPECIALIST

## 2018-03-27 PROCEDURE — 3078F DIAST BP <80 MM HG: CPT | Mod: CPTII,S$GLB,, | Performed by: SPECIALIST

## 2018-03-27 NOTE — PROCEDURES
"Laryngoscopy  Date/Time: 3/27/2018 11:19 AM  Performed by: ДМИТРИЙ THOMPSON  Authorized by: ДМИТРИЙ THOMPSON     Time out: Immediately prior to procedure a "time out" was called to verify the correct patient, procedure, equipment, support staff and site/side marked as required.    Consent Done?:  Yes (Verbal)  Anesthesia:     Local anesthetic:  Lidocaine 2% and Juan-Synephrine 1/2% (Topical aerosol)    Patient tolerance:  Patient tolerated the procedure well with no immediate complications    Decongestion performed?: Yes    Laryngoscopy:     Laryngoscopy areas: Nasal cavities, nasopharynx, oral vertex, hypopharynx, larynx, vocal cords.    Laryngoscope size:  4 mm (Neck supple video nasal laryngoscopy)  Nose Intranasal:      Mucosa no polyps     Mucosa ulcers not present     No mucosa lesions present (clear mucus in the nasal passages bilaterally)     Septum gross deformity (septum deviated to the left with minimal nasal crest deviation to the right anteriorly)     Enlarged turbinates (inferior turbinates mildly enlarged)  Nasopharynx:      No mucosa lesions     Adenoids not present     Posterior choanae patent     Eustachian tube patent  Larynx/hypopharynx:      No epiglottis lesions     No epiglottis edema     No AE folds lesions     No vocal cord polyps     Equal and normal bilateral (vocal cords move symmetrically, no edema or mucosal lesions noted)     No hypopharynx lesions     No piriform sinus pooling     No piriform sinus lesions     Post cricoid edema (no significant change from last endos)     Post cricoid erythema (significantly improved from last endoscopy)     Patient showing early beneficial therapeutic response to twice a day PPI        "

## 2018-03-28 NOTE — PROGRESS NOTES
Subjective:       Patient ID: Shira Vega is a 62 y.o. female.    Chief Complaint: Follow-up    The patient is returning for a follow-up visit.  The swelling in her right cheek has improved but has not resolved.  When she smiles the right side of her lip still does not fully elevate.  In general, she finds that her headaches have improved.  She continues to have some difficulty finding words when she speaks.  Her throat feels less swollen.  She continues to have to clear her throat, but not as often as previously.  She also finds that her voice is more crisp.      Review of Systems   Constitutional: Positive for fatigue. Negative for activity change, appetite change, chills, fever and unexpected weight change.   HENT: Positive for sore throat, trouble swallowing and voice change. Negative for congestion, ear discharge, ear pain, facial swelling, hearing loss, mouth sores, postnasal drip, rhinorrhea, sinus pain, sinus pressure, sneezing and tinnitus.    Eyes: Negative for photophobia, pain, discharge, redness, itching and visual disturbance.   Respiratory: Negative for apnea, cough, choking, shortness of breath and wheezing.    Cardiovascular: Negative for chest pain and palpitations.   Gastrointestinal: Negative for abdominal distention, abdominal pain, nausea and vomiting.   Musculoskeletal: Negative for arthralgias, myalgias, neck pain and neck stiffness.   Skin: Negative.  Negative for color change, pallor and rash.   Allergic/Immunologic: Negative for environmental allergies, food allergies and immunocompromised state.   Neurological: Positive for speech difficulty, light-headedness and headaches. Negative for dizziness, facial asymmetry, weakness and numbness.   Hematological: Negative for adenopathy. Does not bruise/bleed easily.   Psychiatric/Behavioral: Negative for confusion, decreased concentration and sleep disturbance.       Objective:      Physical Exam   Constitutional: She is oriented to person,  place, and time. She appears well-developed and well-nourished. She is cooperative.   HENT:   Head: Normocephalic.   Right Ear: Tympanic membrane, external ear and ear canal normal.   Left Ear: Tympanic membrane, external ear and ear canal normal.   Nose: Mucosal edema (cyanotic, boggy inferior turbinates bilaterally), rhinorrhea (clear mucus bilaterally) and septal deviation (to the left) present.   Mouth/Throat: Uvula is midline, oropharynx is clear and moist and mucous membranes are normal. No oral lesions.   Face-minimal induration and swelling in the right nasolabial fold now measures 1.5 x 0.5 x 0.25 cm, right labial commissure does not raise as high as the left with smiling, but much improved from last visit   Eyes: EOM and lids are normal. Pupils are equal, round, and reactive to light. Right eye exhibits no discharge and no exudate. Left eye exhibits no discharge and no exudate. Right conjunctiva is injected. Left conjunctiva is injected.   Neck: Trachea normal and normal range of motion. No muscular tenderness present. No tracheal deviation present. No thyroid mass and no thyromegaly present.   Cardiovascular: Normal rate, regular rhythm, normal heart sounds and normal pulses.    Pulmonary/Chest: Effort normal and breath sounds normal. No stridor. She has no wheezes. She has no rhonchi. She has no rales.   Abdominal: Soft. Bowel sounds are normal. There is no tenderness.   Musculoskeletal: Normal range of motion.   Lymphadenopathy:        Head (right side): No submental, no submandibular, no preauricular, no posterior auricular and no occipital adenopathy present.        Head (left side): No submental, no submandibular, no preauricular, no posterior auricular and no occipital adenopathy present.     She has no cervical adenopathy.   Neurological: She is alert and oriented to person, place, and time. She has normal strength. No cranial nerve deficit ( Decreased motion in the buccal branch of the right  facial nerve ) or sensory deficit. Gait normal.   Skin: Skin is warm and dry. No petechiae and no rash noted. No cyanosis. Nails show no clubbing.   Psychiatric: She has a normal mood and affect. Her speech is normal and behavior is normal. Judgment and thought content normal. Cognition and memory are normal.       Assessment:       1. Laryngopharyngeal reflux (LPR)    2. Allergic rhinitis, unspecified chronicity, unspecified seasonality, unspecified trigger    3. Dysphagia, unspecified type    4. Nasal septal deviation    5. Contusion of other part of head, subsequent encounter        Plan:       I will have the patient continue with warm/moist compresses to the facial area followed by massage at least twice daily.  I will recheck her in 4 weeks.

## 2018-04-10 RX ORDER — LEVOTHYROXINE SODIUM 50 UG/1
TABLET ORAL
Qty: 90 TABLET | Refills: 2 | Status: SHIPPED | OUTPATIENT
Start: 2018-04-10 | End: 2019-01-28 | Stop reason: SDUPTHER

## 2018-04-10 RX ORDER — PRAVASTATIN SODIUM 20 MG/1
TABLET ORAL
Qty: 90 TABLET | Refills: 2 | Status: SHIPPED | OUTPATIENT
Start: 2018-04-10 | End: 2018-12-24 | Stop reason: SDUPTHER

## 2018-05-17 ENCOUNTER — PATIENT MESSAGE (OUTPATIENT)
Dept: FAMILY MEDICINE | Facility: CLINIC | Age: 63
End: 2018-05-17

## 2018-05-31 ENCOUNTER — PATIENT MESSAGE (OUTPATIENT)
Dept: OTOLARYNGOLOGY | Facility: CLINIC | Age: 63
End: 2018-05-31

## 2018-05-31 ENCOUNTER — OFFICE VISIT (OUTPATIENT)
Dept: OTOLARYNGOLOGY | Facility: CLINIC | Age: 63
End: 2018-05-31
Payer: COMMERCIAL

## 2018-05-31 VITALS
HEART RATE: 55 BPM | BODY MASS INDEX: 23.04 KG/M2 | SYSTOLIC BLOOD PRESSURE: 129 MMHG | WEIGHT: 130 LBS | HEIGHT: 63 IN | TEMPERATURE: 98 F | DIASTOLIC BLOOD PRESSURE: 71 MMHG

## 2018-05-31 DIAGNOSIS — R09.89 CHRONIC THROAT CLEARING: ICD-10-CM

## 2018-05-31 DIAGNOSIS — J34.2 NASAL SEPTAL DEVIATION: ICD-10-CM

## 2018-05-31 DIAGNOSIS — J30.0 VASOMOTOR RHINITIS: ICD-10-CM

## 2018-05-31 DIAGNOSIS — K21.9 LARYNGOPHARYNGEAL REFLUX (LPR): Primary | ICD-10-CM

## 2018-05-31 DIAGNOSIS — R09.82 PND (POST-NASAL DRIP): ICD-10-CM

## 2018-05-31 DIAGNOSIS — R13.10 DYSPHAGIA, UNSPECIFIED TYPE: ICD-10-CM

## 2018-05-31 DIAGNOSIS — S00.83XD CONTUSION OF OTHER PART OF HEAD, SUBSEQUENT ENCOUNTER: ICD-10-CM

## 2018-05-31 PROCEDURE — 3074F SYST BP LT 130 MM HG: CPT | Mod: CPTII,S$GLB,, | Performed by: SPECIALIST

## 2018-05-31 PROCEDURE — 3078F DIAST BP <80 MM HG: CPT | Mod: CPTII,S$GLB,, | Performed by: SPECIALIST

## 2018-05-31 PROCEDURE — 31575 DIAGNOSTIC LARYNGOSCOPY: CPT | Mod: S$GLB,,, | Performed by: SPECIALIST

## 2018-05-31 PROCEDURE — 99214 OFFICE O/P EST MOD 30 MIN: CPT | Mod: 25,S$GLB,, | Performed by: SPECIALIST

## 2018-05-31 PROCEDURE — 3008F BODY MASS INDEX DOCD: CPT | Mod: CPTII,S$GLB,, | Performed by: SPECIALIST
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
## 2018-05-31 NOTE — PROCEDURES
"Laryngoscopy  Date/Time: 5/31/2018 12:18 PM  Performed by: ДМИТРИЙ THOMPSON  Authorized by: ДМИТРИЙ THOMPSON     Time out: Immediately prior to procedure a "time out" was called to verify the correct patient, procedure, equipment, support staff and site/side marked as required.    Consent Done?:  Yes (Verbal)  Anesthesia:     Local anesthetic:  Lidocaine 2% and Juan-Synephrine 1/2% (Topical aerosol)    Patient tolerance:  Patient tolerated the procedure well with no immediate complications    Decongestion performed?: Yes    Laryngoscopy:     Laryngoscopy areas: Nasal cavities, nasopharynx, oropharynx, hypopharynx, larynx, vocal cords.    Laryngoscope size:  4 mm (Flexible video nasolaryngoscopy)  Nose External:      No external nasal deformity  Nose Intranasal:      Mucosa no polyps     Mucosa ulcers not present     No mucosa lesions present (Clear mucus in the nasal passages bilaterally)     Septum gross deformity ( deviated to the left)     Enlarged turbinates ( inferior turbinates enlarged bilaterally)  Nasopharynx:      No mucosa lesions     Adenoids not present     Posterior choanae patent     Eustachian tube patent  Larynx/hypopharynx:      No epiglottis lesions     No epiglottis edema     No AE folds lesions     No vocal cord polyps     Equal and normal bilateral ( vocal cords move symmetrically and are without lesions)     No hypopharynx lesions     No piriform sinus pooling     No piriform sinus lesions     Post cricoid edema ( mild-improved from last endoscopy 2 months ago)     Post cricoid erythema ( mild-improved from last endoscopy if 2 months ago)     Follow-up endoscopy reveals significant improvement of laryngopharyngeal reflux on twice daily PPI therapy.      "

## 2018-06-01 ENCOUNTER — TELEPHONE (OUTPATIENT)
Dept: OTOLARYNGOLOGY | Facility: CLINIC | Age: 63
End: 2018-06-01

## 2018-06-01 RX ORDER — IPRATROPIUM BROMIDE 21 UG/1
SPRAY, METERED NASAL
Qty: 30 ML | Refills: 11 | Status: SHIPPED | OUTPATIENT
Start: 2018-06-01 | End: 2019-08-06 | Stop reason: SDUPTHER

## 2018-06-04 ENCOUNTER — PATIENT MESSAGE (OUTPATIENT)
Dept: OTOLARYNGOLOGY | Facility: CLINIC | Age: 63
End: 2018-06-04

## 2018-06-05 DIAGNOSIS — Z87.81 HISTORY OF SKULL FRACTURE: Primary | ICD-10-CM

## 2018-06-05 DIAGNOSIS — Z86.79 HISTORY OF INTRACRANIAL HEMORRHAGE: ICD-10-CM

## 2018-06-05 DIAGNOSIS — R42 VERTIGO: ICD-10-CM

## 2018-06-18 ENCOUNTER — CLINICAL SUPPORT (OUTPATIENT)
Dept: REHABILITATION | Facility: HOSPITAL | Age: 63
End: 2018-06-18
Attending: SPECIALIST
Payer: COMMERCIAL

## 2018-06-18 DIAGNOSIS — Z74.09 IMPAIRED FUNCTIONAL MOBILITY, BALANCE, AND ENDURANCE: ICD-10-CM

## 2018-06-18 DIAGNOSIS — F40.298 FEAR OF FALLING: ICD-10-CM

## 2018-06-18 PROCEDURE — 97161 PT EVAL LOW COMPLEX 20 MIN: CPT | Mod: PO
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
## 2018-06-22 ENCOUNTER — CLINICAL SUPPORT (OUTPATIENT)
Dept: REHABILITATION | Facility: HOSPITAL | Age: 63
End: 2018-06-22
Attending: SPECIALIST
Payer: COMMERCIAL

## 2018-06-22 DIAGNOSIS — Z74.09 IMPAIRED FUNCTIONAL MOBILITY, BALANCE, AND ENDURANCE: ICD-10-CM

## 2018-06-22 DIAGNOSIS — F40.298 FEAR OF FALLING: ICD-10-CM

## 2018-06-22 PROCEDURE — 97112 NEUROMUSCULAR REEDUCATION: CPT | Mod: PO

## 2018-06-22 PROCEDURE — 97110 THERAPEUTIC EXERCISES: CPT | Mod: PO

## 2018-06-22 NOTE — PROGRESS NOTES
"                                                  Physical Therapy Progress Note     Name: Shira Vega  Clinic Number: [id number]  Diagnosis:   Encounter Diagnoses   Name Primary?    Impaired functional mobility, balance, and endurance     Fear of falling      Physician: ДМИТРИЙ Macdonald MD  Treatment Orders: PT Eval and Treat  Past Medical History:   Diagnosis Date    Basilar skull fracture 1/27/2015    Bilateral fracture of pubic rami 1/27/2015    Fracture of ribs, multiple, closed 1/27/2015    HTN (hypertension), benign     Hyperlipidemia LDL goal <160     Hypothyroidism     MVP (mitral valve prolapse)        Evaluation Date: 06/18/18  Visit #: 2  Plan of care expiration: 08/13/18  Precautions: universal, fall    FOTO 2/10     On ST caseload?No         Subjective   Pt reports: that she's doing well this morning  Pain Scale:  No pain    Objective     Patient received individual therapy with activities as follows:     Shira received therapeutic exercises to develop strength, endurance and flexibility for 15 minutes including:     x 8 min upright recumbent bike L9 for CV endurance    3 x 30" standing GS stretch at incline, BUE support    3 x 10 standing heel raises with BUE support    Patient participated in neuromuscular re-education activities to improve: Balance and Coordination for 25 minutes. The following activities were included:     X 4 laps tandem ambulation in // bars, occ touchdown support  X 4 laps alternating marching in // bars, occ UE support, CGA to occ Min A    2 x 30" each LE in front, tandem stance with EO, CGA to occ Min A, occ touchdown support  2 x 30" each LE in front, tandem stance with EC, CGA to occ Min A, occ touchdown support    X 10 each LE forward step up onto soft side of bosu with alternate LE into hip flexion, 1 UE support, CGA    X 30" R<>L weight shifting on BOSU, Min A    Written Home Exercises: standing heel raises, standing toes raises; both with BUE support  Pt demo " good understanding of the education provided. Shira demonstrated good return demonstration of activities.     Education provided re: POC, HEP  No spiritual or educational barriers to learning provided    Pt has no cultural, educational or language barriers to learning provided.    Assessment   Shira tolerated therapy well this morning without difficulty. Initiated balance training without difficulty, but patient did require UE support to steady. Initiated HEP for ankle strengthening with emphasis on BUE support available while performing HEP at home. Pt to benefit from continued PT intervention to further progress remaining balance deficits.     Pt rehab potential is Excellent. Pt will benefit from continuing skilled outpatient physical therapy to address the deficits listed below in the problem list, provide pt/family education and to maximize pt's level of independence in the home and community environment.              History  Co-morbidities and personal factors that may impact the plan of care Examination  Body Structures and Functions, activity limitations and participation restrictions that may impact the plan of care      Clinical Presentation   Co-morbidities:   h/o skull fracture, h/o intracranial hemorrhage, head contusion, non intractable episodic HA, nasal septal deviation, HTN, hyperlipidemia, hypercalcemia, hypothyroidism, vitamin D deficiency, LPR, dysphagia, osteopenia, allergic rhinitis        Personal Factors:   no deficits Body Regions:   head  lower extremities  upper extremities  trunk     Body Systems:    gross symmetry  ROM  strength  gross coordinated movement  balance  gait  transfers  motor control                 Participation Restrictions:   Decreased safety with community mobility, particularly when negotiating steps        Activity limitations:   Learning and applying knowledge  no deficits     General Tasks and Commands  no deficits     Communication  no deficits     Mobility  walking    Stair negotiation     Self care  no deficits     Domestic Life  no deficits     Interactions/Relationships  no deficits     Life Areas  no deficits     Community and Social Life  community life  recreation and leisure             stable and uncomplicated                                low   moderate   moderate Decision Making/ Complexity Score:  low            Pt's spiritual, cultural and educational needs considered and pt agreeable to plan of care and goals as stated below:      GOALS:   Short term goals: 4 weeks, pt agrees to goals set.  1. Pt to be (I) with established HEP  2. Pt to improve GST condition 2 to at least 10 seconds for improved balance and decreased fall risk  3. Pt to improve GST condition 3 to at least 15 seconds for improved balance and decreased fall risk  4. Pt to improve GST condition 4 to at least 5 seconds for improved balance and decreased fall risk  5. Pt to improve GST condition 6 to at least 10 seconds for improved balance and decreased fall risk  6. Pt to improve FGA score to at least 23/30 for improved balance and decreased fall risk  7. Pt to improve RLE SLS score to at least 8 seconds for improved safety with standing ADL  8. Pt to improve LLE SLS score to at least 3 seconds for improved safety with standing ADL     Long term goals: 8 weeks, pt agrees to goals set  9. Pt to improve GST condition 2 to at least 15 seconds for improved balance and decreased fall risk  10. Pt to improve GST condition 3 to at least 20 seconds for improved balance and decreased fall risk  11. Pt to improve GST condition 4 to at least 10 seconds for improved balance and decreased fall risk  12. Pt to improve GST condition 6 to at least 15 seconds for improved balance and decreased fall risk  13. Pt to improve FGA score to at least 26/30 for improved balance and decreased fall risk  14. Pt to improve RLE SLS score to at least 10 seconds for improved safety with standing ADL  15. Pt to improve LLE SLS  score to at least 5 seconds for improved safety with standing ADL  16. Pt to improve FOTO score to at least 81% for improved self concept of functional mobility.         Plan   Continue PT 1-2x weekly under established Plan of Care, with treatment to include: pt education, HEP, therapeutic exercises, neuromuscular re-education/balance exercises, therapeutic activities, joint mobilizations, and modalities PRN, to work towards established goals. Pt may be seen by PTA to carry out plan of care.     Viridiana Matthew, PT   06/22/2018

## 2018-06-25 ENCOUNTER — PATIENT OUTREACH (OUTPATIENT)
Dept: ADMINISTRATIVE | Facility: HOSPITAL | Age: 63
End: 2018-06-25

## 2018-06-25 NOTE — PROGRESS NOTES
Dear Shira Vega,        Our records indicate that you are due for a mammogram.    In the [place], one in nine women will develop breast cancer during their lifetime. While there is no way to prevent breast cancer, early detection provides the best opportunity for curing it.    For women over the age of 40, the American Cancer Society recommends a yearly clinical breast exam and a yearly mammogram. These practices have saved thousands of lives. We need your help to ensure that you are receiving optimal medical care.    Please make an appointment for a mammogram at your earliest convenience.    Sincerely,    ANGELLA Herron  Clinical Care Coordinator  Internal Medicine  [facility], [place]/[facility]/[place]

## 2018-06-26 ENCOUNTER — OFFICE VISIT (OUTPATIENT)
Dept: FAMILY MEDICINE | Facility: CLINIC | Age: 63
End: 2018-06-26
Attending: FAMILY MEDICINE
Payer: COMMERCIAL

## 2018-06-26 VITALS
BODY MASS INDEX: 22.97 KG/M2 | DIASTOLIC BLOOD PRESSURE: 78 MMHG | WEIGHT: 129.63 LBS | OXYGEN SATURATION: 99 % | SYSTOLIC BLOOD PRESSURE: 130 MMHG | HEART RATE: 54 BPM | HEIGHT: 63 IN

## 2018-06-26 DIAGNOSIS — I10 HTN (HYPERTENSION), BENIGN: ICD-10-CM

## 2018-06-26 DIAGNOSIS — Z01.818 PREOP EXAMINATION: Primary | ICD-10-CM

## 2018-06-26 DIAGNOSIS — Z12.39 SCREENING FOR BREAST CANCER: ICD-10-CM

## 2018-06-26 DIAGNOSIS — H26.9 CATARACT, UNSPECIFIED CATARACT TYPE, UNSPECIFIED LATERALITY: ICD-10-CM

## 2018-06-26 DIAGNOSIS — Z86.79 HISTORY OF INTRACRANIAL HEMORRHAGE: ICD-10-CM

## 2018-06-26 DIAGNOSIS — E03.9 HYPOTHYROIDISM, UNSPECIFIED TYPE: ICD-10-CM

## 2018-06-26 PROCEDURE — 99243 OFF/OP CNSLTJ NEW/EST LOW 30: CPT | Mod: S$GLB,,, | Performed by: FAMILY MEDICINE

## 2018-06-26 PROCEDURE — 99999 PR PBB SHADOW E&M-EST. PATIENT-LVL III: CPT | Mod: PBBFAC,,, | Performed by: FAMILY MEDICINE

## 2018-06-26 NOTE — PROGRESS NOTES
Subjective:      Shira Vega is a 62 y.o. female who presents to the office today for a preoperative consultation at the request of surgeon Dr. Thiago Morton who plans on performing ICCE/IOL OD on July 25. This consultation is requested for the specific conditions prompting preoperative evaluation. Planned anesthesia: modified.  Patients bleeding risk: no recent abnormal bleeding, no remote history of abnormal bleeding and no use of Ca-channel blockers.    Patient Active Problem List   Diagnosis    HTN (hypertension), benign    Hyperlipidemia    Hypothyroidism    History of skull fracture    History of intracranial hemorrhage    [place] cardiac risk <10% in next 10 years    Hypercalcemia    Vitamin D deficiency    Osteopenia    Head contusion    Laryngopharyngeal reflux (LPR)    Dysphagia    Allergic rhinitis    Nasal septal deviation    Nonintractable episodic headache    Vasomotor rhinitis    Chronic throat clearing    Impaired functional mobility, balance, and endurance    Fear of falling       Past Surgical History:   Procedure Laterality Date    FOOT SURGERY      WISDOM TOOTH EXTRACTION           Current Outpatient Prescriptions:     aspirin (ASPIRIN LOW DOSE) 81 MG EC tablet, Take 1 tablet by mouth., Disp: , Rfl:     bisoprolol (ZEBETA) 10 MG tablet, Take 1 tablet (10 mg total) by mouth once daily., Disp: 30 tablet, Rfl: 6    cholecalciferol, vitamin D3, 1,000 unit capsule, Take 1 capsule (1,000 Units total) by mouth once daily., Disp: , Rfl: 0    ipratropium (ATROVENT) 0.03 % nasal spray, Two sprays in each nostril before going to bed every night, and 1-2 sprays each nostril prior to eating, Disp: 30 mL, Rfl: 11    levothyroxine (SYNTHROID) 50 MCG tablet, take 1 tablet by mouth every morning ON AN EMPTY STOMACH, Disp: 90 tablet, Rfl: 2    pravastatin (PRAVACHOL) 20 MG tablet, take 1 tablet by mouth once daily, Disp: 90 tablet, Rfl: 2    ranitidine (ZANTAC) 150 MG  tablet, Take 1 tablet (150 mg total) by mouth 2 (two) times daily., Disp: 30 tablet, Rfl: 11    VIMPAT 150 mg Tab, , Disp: , Rfl: 0    Review of patient's allergies indicates:   Allergen Reactions    No known allergies        Family History   Problem Relation Age of Onset    Cancer Mother         Lung    Hypertension Mother     Thyroid disease Mother     Osteoarthritis Maternal Grandmother     Hypertension Maternal Grandmother     Cancer Maternal Grandfather         colon       Social History     Social History    Marital status:      Spouse name: N/A    Number of children: 1    Years of education: N/A     Occupational History     [employer]     retired     Social History Main Topics    Smoking status: Former Smoker     Types: Cigarettes     Quit date: 1998    Smokeless tobacco: Never Used      Comment: smoked when in college, a couple cigs. when she went out    Alcohol use 3.5 oz/week     7 Standard drinks or equivalent per week      Comment: drinks glass of wine or cocktail in the evening    Drug use: No    Sexual activity: Yes     Partners: Male      Comment:      Other Topics Concern    Not on file     Social History Narrative    The patient does exercise regularly (TIW: spinning, light resistance).      Rates diet as good.      She is not satisfied with weight.     with 1 child    She does drink at least 1/2 gallon water daily.    She drinks 0 coffee/tea/caffeine-containing soft drinks daily.    Total sleep time at night is 8-10 hours.    She no longer work- retired.    She does wear seat belts.    Hobbies include none.                   OB History      Para Term  AB Living    1 1            SAB TAB Ectopic Multiple Live Births                     Obstetric Comments    Menarche age 11.  LMP age 53.  First child born age 33.  History of abnormal PAP smear: NO.  History of abnormal mammogram: NO.  History of sexually transmitted disease:  NO    Breast  "Cancer Risk:  5-year Risk: Patient 2.1%, Average1.9%  Lifetime Risk: P atient 9.4%, Average 8.6%              Patient Care Team:  Justen Barragan Jr., MD as PCP - General (Family Medicine)  Germaine Fish LPN as Care Coordinator    Review of Systems  Answers for HPI/ROS submitted by the patient on 6/25/2018   activity change: No  unexpected weight change: No  neck pain: No  hearing loss: No  rhinorrhea: No  trouble swallowing: No  eye discharge: No  visual disturbance: Yes  chest tightness: No  wheezing: No  chest pain: No  palpitations: No  blood in stool: No  constipation: No  vomiting: No  diarrhea: No  polydipsia: No  polyuria: No  difficulty urinating: No  hematuria: No  menstrual problem: No  dysuria: No  joint swelling: No  arthralgias: No  headaches: No  weakness: No  confusion: No  dysphoric mood: No       Objective:        /78 (BP Location: Left arm, Patient Position: Sitting, BP Method: Small (Manual))   Pulse (!) 54   Ht 5' 3" (1.6 m)   Wt 58.8 kg (129 lb 9.6 oz)   SpO2 99%   BMI 22.96 kg/m²     General Appearance:    Alert, cooperative, no distress, appears stated age   Head:    Normocephalic, without obvious abnormality, atraumatic   Eyes:    PERRL, conjunctiva/corneas clear, EOM's intact, fundi     benign, both eyes   Ears:    Normal TM's and external ear canals, both ears   Nose:   Nares normal, septum midline, mucosa normal, no drainage    or sinus tenderness   Throat:   Lips, mucosa, and tongue normal; teeth and gums normal   Neck:   Supple, symmetrical, trachea midline, no adenopathy;     thyroid:  no enlargement/tenderness/nodules; no carotid    bruit or JVD   Back:     Symmetric, no curvature, ROM normal, no CVA tenderness   Lungs:     Clear to auscultation bilaterally, respirations unlabored   Chest Wall:    No tenderness or deformity    Heart:    Regular rate and rhythm, S1 and S2 normal, no murmur, rub   or gallop   Abdomen:     Soft, non-tender, bowel sounds active all four " quadrants,     no masses, no organomegaly   Extremities:   Extremities normal, atraumatic, no cyanosis or edema   Pulses:   2+ and symmetric all extremities   Skin:   Skin color, texture, turgor normal, no rashes or lesions   Lymph nodes:   Cervical, supraclavicular, and axillary nodes normal   Neurologic:   CNII-XII intact, normal strength, sensation and reflexes     throughout     Cardiographics  ECG 2013: sinus bradycardia    Imaging  Chest x-ray: NA    Lab Review   None necessary     Assessment:        62 y.o. female with planned surgery as above.    Known risk factors for perioperative complications: None      1. Preop examination     2. Cataract, unspecified cataract type, unspecified laterality     3. HTN (hypertension), benign     4. Hypothyroidism, unspecified type     5. History of intracranial hemorrhage     6. Screening for breast cancer  Mammo Digital Screening Bilat with Tomosynthesis CAD       Current medications which may produce withdrawal symptoms if withheld perioperatively: bisoprolol     Plan:      1. Preoperative workup as follows none.  2. Change in medication regimen before surgery: discontinue ASA 14 days before surgery.  3. Prophylaxis for cardiac events with perioperative beta-blockers: not indicated.  4. Invasive hemodynamic monitoring perioperatively: not indicated.  5. Deep vein thrombosis prophylaxis postoperatively:NA.  6. Surveillance for postoperative MI with ECG immediately postoperatively and on postoperative days 1 and 2 AND troponin levels 24 hours postoperatively and on day 4 or hospital discharge (whichever comes first): not indicated.    The patient is cleared for surgery.        Justen Barragan MD

## 2018-07-03 RX ORDER — BISOPROLOL FUMARATE 10 MG/1
TABLET, FILM COATED ORAL
Qty: 30 TABLET | Refills: 0 | Status: SHIPPED | OUTPATIENT
Start: 2018-07-03 | End: 2018-07-31 | Stop reason: SDUPTHER

## 2018-07-03 NOTE — TELEPHONE ENCOUNTER
----- Message from Shawna Delaney sent at 7/3/2018 11:49 AM CDT -----  Contact: Pt  Pt is requesting a callback from office regarding a medication refill     Says she have a surgery coming up and is not able to come in until after the surgery    Pt is requesting a callback at [phone] if no answer please leave a message.    Thanks

## 2018-07-06 ENCOUNTER — CLINICAL SUPPORT (OUTPATIENT)
Dept: REHABILITATION | Facility: HOSPITAL | Age: 63
End: 2018-07-06
Attending: SPECIALIST
Payer: COMMERCIAL

## 2018-07-06 DIAGNOSIS — F40.298 FEAR OF FALLING: ICD-10-CM

## 2018-07-06 DIAGNOSIS — Z74.09 IMPAIRED FUNCTIONAL MOBILITY, BALANCE, AND ENDURANCE: ICD-10-CM

## 2018-07-06 PROCEDURE — 97110 THERAPEUTIC EXERCISES: CPT | Mod: PO

## 2018-07-06 PROCEDURE — 97112 NEUROMUSCULAR REEDUCATION: CPT | Mod: PO

## 2018-07-06 NOTE — PROGRESS NOTES
"                                                  Physical Therapy Progress Note     Name: Shira Vega  Clinic Number: [id number]  Diagnosis:   Encounter Diagnoses   Name Primary?    Impaired functional mobility, balance, and endurance     Fear of falling      Physician: ДМИТРИЙ Macdonald MD  Treatment Orders: PT Eval and Treat  Past Medical History:   Diagnosis Date    Basilar skull fracture 1/27/2015    Bilateral fracture of pubic rami 1/27/2015    Fracture of ribs, multiple, closed 1/27/2015    HTN (hypertension), benign     Hyperlipidemia LDL goal <160     Hypothyroidism     MVP (mitral valve prolapse)        Evaluation Date: 06/18/18  Visit #: 3  Plan of care expiration: 08/13/18  Precautions: universal, fall    FOTO 3/10     On ST caseload?No         Subjective   Pt reports: she is HEP compliant.  Pain Scale:  No pain    Objective     Patient received individual therapy with activities as follows:     Shira received therapeutic exercises to develop strength, endurance and flexibility for 15 minutes including:     x 8 min upright recumbent bike L9 for CV endurance    3 x 30" standing GS stretch at incline, BUE support    3 x 10 standing heel raises with  No UE support  2 x 10 reps toe raises no UE sup    Patient participated in neuromuscular re-education activities to improve: Balance and Coordination for 30 minutes. The following activities were included:     X 4 laps tandem ambulation in // bars, occ touchdown support  X 4 laps alternating marching in // bars, occ UE support, CGA to occ Min A  X 20 reps lateral weight shifts  X 10 reps pelvic clock CW/CC ea.  X 10 reps alternate opposite foot placement 6" step 1 progressing to no UE support  X 10 reps step ups to 6" step 1 progressing to No UE support (very difficult)        2 x 30" each LE in front, tandem stance with EO, CGA to occ Min A, occ touchdown support  2 x 30" each LE in front, tandem stance with EC, CGA to occ Min A, occ touchdown " "support    X 10 each LE forward step up onto soft side of bosu with alternate LE into hip flexion, 1 UE support, CGA NP    X 30" R<>L weight shifting on BOSU, Min A NP    Written Home Exercises: standing heel raises, standing toes raises; both with BUE support  Pt demo good understanding of the education provided. Shira demonstrated good return demonstration of activities.     Education provided re: POC, HEP  No spiritual or educational barriers to learning provided    Pt has no cultural, educational or language barriers to learning provided.    Assessment   Shira tolerated therapy well. Most challenged by stepping bkwd down from step. Patient did require UE support to steady but progressed to less support in some instances.  Pt to benefit from continued PT intervention to further progress remaining balance deficits.      Pt rehab potential is Excellent. Pt will benefit from continuing skilled outpatient physical therapy to address the deficits listed below in the problem list, provide pt/family education and to maximize pt's level of independence in the home and community environment.              History  Co-morbidities and personal factors that may impact the plan of care Examination  Body Structures and Functions, activity limitations and participation restrictions that may impact the plan of care      Clinical Presentation   Co-morbidities:   h/o skull fracture, h/o intracranial hemorrhage, head contusion, non intractable episodic HA, nasal septal deviation, HTN, hyperlipidemia, hypercalcemia, hypothyroidism, vitamin D deficiency, LPR, dysphagia, osteopenia, allergic rhinitis        Personal Factors:   no deficits Body Regions:   head  lower extremities  upper extremities  trunk     Body Systems:    gross symmetry  ROM  strength  gross coordinated movement  balance  gait  transfers  motor control                 Participation Restrictions:   Decreased safety with community mobility, particularly when " negotiating steps        Activity limitations:   Learning and applying knowledge  no deficits     General Tasks and Commands  no deficits     Communication  no deficits     Mobility  walking   Stair negotiation     Self care  no deficits     Domestic Life  no deficits     Interactions/Relationships  no deficits     Life Areas  no deficits     Community and Social Life  community life  recreation and leisure             stable and uncomplicated                                low   moderate   moderate Decision Making/ Complexity Score:  low            Pt's spiritual, cultural and educational needs considered and pt agreeable to plan of care and goals as stated below:      GOALS:   Short term goals: 4 weeks, pt agrees to goals set.  1. Pt to be (I) with established HEP  2. Pt to improve GST condition 2 to at least 10 seconds for improved balance and decreased fall risk  3. Pt to improve GST condition 3 to at least 15 seconds for improved balance and decreased fall risk  4. Pt to improve GST condition 4 to at least 5 seconds for improved balance and decreased fall risk  5. Pt to improve GST condition 6 to at least 10 seconds for improved balance and decreased fall risk  6. Pt to improve FGA score to at least 23/30 for improved balance and decreased fall risk  7. Pt to improve RLE SLS score to at least 8 seconds for improved safety with standing ADL  8. Pt to improve LLE SLS score to at least 3 seconds for improved safety with standing ADL     Long term goals: 8 weeks, pt agrees to goals set  9. Pt to improve GST condition 2 to at least 15 seconds for improved balance and decreased fall risk  10. Pt to improve GST condition 3 to at least 20 seconds for improved balance and decreased fall risk  11. Pt to improve GST condition 4 to at least 10 seconds for improved balance and decreased fall risk  12. Pt to improve GST condition 6 to at least 15 seconds for improved balance and decreased fall risk  13. Pt to improve FGA  score to at least 26/30 for improved balance and decreased fall risk  14. Pt to improve RLE SLS score to at least 10 seconds for improved safety with standing ADL  15. Pt to improve LLE SLS score to at least 5 seconds for improved safety with standing ADL  16. Pt to improve FOTO score to at least 81% for improved self concept of functional mobility.         Plan   Continue PT 1-2x weekly under established Plan of Care, with treatment to include: pt education, HEP, therapeutic exercises, neuromuscular re-education/balance exercises, therapeutic activities, joint mobilizations, and modalities PRN, to work towards established goals. Pt may be seen by PTA to carry out plan of care.     Logan Smith, PTA   07/06/2018

## 2018-07-10 ENCOUNTER — CLINICAL SUPPORT (OUTPATIENT)
Dept: REHABILITATION | Facility: HOSPITAL | Age: 63
End: 2018-07-10
Attending: SPECIALIST
Payer: COMMERCIAL

## 2018-07-10 DIAGNOSIS — F40.298 FEAR OF FALLING: ICD-10-CM

## 2018-07-10 DIAGNOSIS — Z74.09 IMPAIRED FUNCTIONAL MOBILITY, BALANCE, AND ENDURANCE: ICD-10-CM

## 2018-07-10 PROCEDURE — 97112 NEUROMUSCULAR REEDUCATION: CPT | Mod: PO

## 2018-07-10 PROCEDURE — 97110 THERAPEUTIC EXERCISES: CPT | Mod: PO

## 2018-07-10 NOTE — PROGRESS NOTES
"                                                  Physical Therapy Progress Note     Name: Shira Vega  Clinic Number: [id number]  Diagnosis:   Encounter Diagnoses   Name Primary?    Impaired functional mobility, balance, and endurance     Fear of falling      Physician: ДМИТРИЙ Macdonald MD  Treatment Orders: PT Eval and Treat  Past Medical History:   Diagnosis Date    Basilar skull fracture 1/27/2015    Bilateral fracture of pubic rami 1/27/2015    Fracture of ribs, multiple, closed 1/27/2015    HTN (hypertension), benign     Hyperlipidemia LDL goal <160     Hypothyroidism     MVP (mitral valve prolapse)        Evaluation Date: 06/18/18  Visit #: 4  Plan of care expiration: 08/13/18  Precautions: universal, fall    FOTO 4/10     On [facility] caseload?No         Subjective   Pt reports: she is HEP compliant.  Pain Scale:  No pain    Objective     Patient received individual therapy with activities as follows:     Shira received therapeutic exercises to develop strength, endurance and flexibility for 20 minutes including:     x 10 min Sci Fit L1 for CV endurance    3 x 30" standing GS stretch at incline, BUE support  2 x 10 reps with 5 sec holds standing heel raises with occ UE sup  2 x 10 reps with 5 sec holds standing toe raises with occ UE sup    Patient participated in neuromuscular re-education activities to improve: Balance and Coordination for 30 minutes. The following activities were included:       Stairs backwards walking with occ UE support     X 2 laps tandem ambulation in // bars, occ touchdown support  X 3 laps alternating marching in // bars, occ UE support, CGA to occ Min A  X 10 reps alternate opposite foot placement 6" step 1 progressing to no UE support  X 10 reps step ups to 6" step 1 progressing to No UE support   1 x 30" each LE in front, tandem stance with EC, CGA to occ Min A, occ touchdown support  X 10 each LE forward step up onto soft side of bosu with alternate LE into hip flexion, 1 UE " "support, CGA   X 10 each LE step overs on blue foam  X 10 each LE step overs on 4 pt fitter   X 10 each LE step overs on BOSU  X 4 Backward walking down 4 steps step to stair negotiation with occ unilateral UE support (mod instability)  X 30" R<>L weight shifting on BOSU, Min A NP    Written Home Exercises: standing heel raises, standing toes raises; both with BUE support  Pt demo good understanding of the education provided. Shira demonstrated good return demonstration of activities.     Education provided re: POC, HEP  No spiritual or educational barriers to learning provided    Pt has no cultural, educational or language barriers to learning provided.    Assessment   Pt demonstrated poor balance during SLS with LLE more affected compared to RLE. It was noted that pt continued to exhibit significant instability when stepping backwards down steps. Pt would benefit from SL activities during subsequent tx visits in order to promote B ankle and hip stabilization. Pt could also benefit from tandem ambulation, vision eliminated, and ambulation with head turns during subsequent tx visits.     Pt rehab potential is Excellent. Pt will benefit from continuing skilled outpatient physical therapy to address the deficits listed below in the problem list, provide pt/family education and to maximize pt's level of independence in the home and community environment.              History  Co-morbidities and personal factors that may impact the plan of care Examination  Body Structures and Functions, activity limitations and participation restrictions that may impact the plan of care      Clinical Presentation   Co-morbidities:   h/o skull fracture, h/o intracranial hemorrhage, head contusion, non intractable episodic HA, nasal septal deviation, HTN, hyperlipidemia, hypercalcemia, hypothyroidism, vitamin D deficiency, LPR, dysphagia, osteopenia, allergic rhinitis        Personal Factors:   no deficits Body Regions:   head  lower " extremities  upper extremities  trunk     Body Systems:    gross symmetry  ROM  strength  gross coordinated movement  balance  gait  transfers  motor control                 Participation Restrictions:   Decreased safety with community mobility, particularly when negotiating steps        Activity limitations:   Learning and applying knowledge  no deficits     General Tasks and Commands  no deficits     Communication  no deficits     Mobility  walking   Stair negotiation     Self care  no deficits     Domestic Life  no deficits     Interactions/Relationships  no deficits     Life Areas  no deficits     Community and Social Life  community life  recreation and leisure             stable and uncomplicated                                low   moderate   moderate Decision Making/ Complexity Score:  low            Pt's spiritual, cultural and educational needs considered and pt agreeable to plan of care and goals as stated below:      GOALS:   Short term goals: 4 weeks, pt agrees to goals set.  1. Pt to be (I) with established HEP  2. Pt to improve GST condition 2 to at least 10 seconds for improved balance and decreased fall risk  3. Pt to improve GST condition 3 to at least 15 seconds for improved balance and decreased fall risk  4. Pt to improve GST condition 4 to at least 5 seconds for improved balance and decreased fall risk  5. Pt to improve GST condition 6 to at least 10 seconds for improved balance and decreased fall risk  6. Pt to improve FGA score to at least 23/30 for improved balance and decreased fall risk  7. Pt to improve RLE SLS score to at least 8 seconds for improved safety with standing ADL  8. Pt to improve LLE SLS score to at least 3 seconds for improved safety with standing ADL     Long term goals: 8 weeks, pt agrees to goals set  9. Pt to improve GST condition 2 to at least 15 seconds for improved balance and decreased fall risk  10. Pt to improve GST condition 3 to at least 20 seconds for improved  balance and decreased fall risk  11. Pt to improve GST condition 4 to at least 10 seconds for improved balance and decreased fall risk  12. Pt to improve GST condition 6 to at least 15 seconds for improved balance and decreased fall risk  13. Pt to improve FGA score to at least 26/30 for improved balance and decreased fall risk  14. Pt to improve RLE SLS score to at least 10 seconds for improved safety with standing ADL  15. Pt to improve LLE SLS score to at least 5 seconds for improved safety with standing ADL  16. Pt to improve FOTO score to at least 81% for improved self concept of functional mobility.         Plan   Continue PT 1-2x weekly under established Plan of Care, with treatment to include: pt education, HEP, therapeutic exercises, neuromuscular re-education/balance exercises, therapeutic activities, joint mobilizations, and modalities PRN, to work towards established goals. Pt may be seen by PTA to carry out plan of care.     Eder Carr, PTA   07/10/2018

## 2018-07-12 ENCOUNTER — CLINICAL SUPPORT (OUTPATIENT)
Dept: REHABILITATION | Facility: HOSPITAL | Age: 63
End: 2018-07-12
Attending: SPECIALIST
Payer: COMMERCIAL

## 2018-07-12 DIAGNOSIS — F40.298 FEAR OF FALLING: ICD-10-CM

## 2018-07-12 DIAGNOSIS — Z74.09 IMPAIRED FUNCTIONAL MOBILITY, BALANCE, AND ENDURANCE: ICD-10-CM

## 2018-07-12 PROCEDURE — 97112 NEUROMUSCULAR REEDUCATION: CPT | Mod: PO

## 2018-07-12 PROCEDURE — 97110 THERAPEUTIC EXERCISES: CPT | Mod: PO

## 2018-07-12 NOTE — PROGRESS NOTES
"                                                  Physical Therapy Progress Note     Name: Shira Vega  Clinic Number: [id number]  Diagnosis:   Encounter Diagnoses   Name Primary?    Impaired functional mobility, balance, and endurance     Fear of falling      Physician: ДМИТРИЙ Macdonald MD  Treatment Orders: PT Eval and Treat  Past Medical History:   Diagnosis Date    Basilar skull fracture 1/27/2015    Bilateral fracture of pubic rami 1/27/2015    Fracture of ribs, multiple, closed 1/27/2015    HTN (hypertension), benign     Hyperlipidemia LDL goal <160     Hypothyroidism     MVP (mitral valve prolapse)        Evaluation Date: 06/18/18  Visit #: 5  Plan of care expiration: 08/13/18  Precautions: universal, fall    FOTO 5/10     On [facility] caseload?No         Subjective   Pt reports: that she had a busy night and morning but besides that she is doing fine. She reports difficulty clipping her bike shoes out of her pedals in spin class.   Pain Scale:  No pain    Objective     Patient received individual therapy with activities as follows:     Shira received therapeutic exercises to develop strength, endurance and flexibility for 25 minutes including:     x 10 min recumbent bike L11, CV endurance    3 x 30" standing GS stretch at incline, BUE support  2 x 10 reps standing heel raises with occ UE sup  2 x 10 reps standing toe raises with occ UE sup    2 x 60" LLE ankle IV<>EV towel slides, seated  2 x 10 seated LLE ankle IV<>EV A/AROM; VCs/TCs to prevent rotation from hip    Patient participated in neuromuscular re-education activities to improve: Balance and Coordination for  20 minutes. The following activities were included:     2 x 30" each LE in front, tandem stance on ground, Min A  X 10 each LE, SLS with alternate LE tapping 3 medium cones placed in front; CGA to occ Min A; occ touchdown support  X 10 LLE forward step + weight shift onto blue rubber disc; 1 UE support, CGA  X 10 LLE forward step + weight " shift + forward anterior lean to reach target in front; 1 UE support; CGA    Written Home Exercises: standing heel raises, standing toes raises; both with BUE support  Pt demo good understanding of the education provided. Shira demonstrated good return demonstration of activities.     Education provided re: POC, HEP  No spiritual or educational barriers to learning provided    Pt has no cultural, educational or language barriers to learning provided.    Assessment   Pt tolerated therapy well this afternoon without difficulty. Therapy session focused on LLE ankle motor control and LLE SLS. During L ankle EV<>IV AROM, pt required constant VCs and TCs to isolate movement at ankle instead of from hip. Pt continues to demonstrate difficulty with LLE SLS and with LLE ankle motor control. Continue POC to progress overall mobility.       Pt rehab potential is Excellent. Pt will benefit from continuing skilled outpatient physical therapy to address the deficits listed below in the problem list, provide pt/family education and to maximize pt's level of independence in the home and community environment.              History  Co-morbidities and personal factors that may impact the plan of care Examination  Body Structures and Functions, activity limitations and participation restrictions that may impact the plan of care      Clinical Presentation   Co-morbidities:   h/o skull fracture, h/o intracranial hemorrhage, head contusion, non intractable episodic HA, nasal septal deviation, HTN, hyperlipidemia, hypercalcemia, hypothyroidism, vitamin D deficiency, LPR, dysphagia, osteopenia, allergic rhinitis        Personal Factors:   no deficits Body Regions:   head  lower extremities  upper extremities  trunk     Body Systems:    gross symmetry  ROM  strength  gross coordinated movement  balance  gait  transfers  motor control                 Participation Restrictions:   Decreased safety with community mobility, particularly when  negotiating steps        Activity limitations:   Learning and applying knowledge  no deficits     General Tasks and Commands  no deficits     Communication  no deficits     Mobility  walking   Stair negotiation     Self care  no deficits     Domestic Life  no deficits     Interactions/Relationships  no deficits     Life Areas  no deficits     Community and Social Life  community life  recreation and leisure             stable and uncomplicated                                low   moderate   moderate Decision Making/ Complexity Score:  low            Pt's spiritual, cultural and educational needs considered and pt agreeable to plan of care and goals as stated below:      GOALS:   Short term goals: 4 weeks, pt agrees to goals set.  1. Pt to be (I) with established HEP  2. Pt to improve GST condition 2 to at least 10 seconds for improved balance and decreased fall risk  3. Pt to improve GST condition 3 to at least 15 seconds for improved balance and decreased fall risk  4. Pt to improve GST condition 4 to at least 5 seconds for improved balance and decreased fall risk  5. Pt to improve GST condition 6 to at least 10 seconds for improved balance and decreased fall risk  6. Pt to improve FGA score to at least 23/30 for improved balance and decreased fall risk  7. Pt to improve RLE SLS score to at least 8 seconds for improved safety with standing ADL  8. Pt to improve LLE SLS score to at least 3 seconds for improved safety with standing ADL     Long term goals: 8 weeks, pt agrees to goals set  9. Pt to improve GST condition 2 to at least 15 seconds for improved balance and decreased fall risk  10. Pt to improve GST condition 3 to at least 20 seconds for improved balance and decreased fall risk  11. Pt to improve GST condition 4 to at least 10 seconds for improved balance and decreased fall risk  12. Pt to improve GST condition 6 to at least 15 seconds for improved balance and decreased fall risk  13. Pt to improve FGA  score to at least 26/30 for improved balance and decreased fall risk  14. Pt to improve RLE SLS score to at least 10 seconds for improved safety with standing ADL  15. Pt to improve LLE SLS score to at least 5 seconds for improved safety with standing ADL  16. Pt to improve FOTO score to at least 81% for improved self concept of functional mobility.         Plan   Continue PT 1-2x weekly under established Plan of Care, with treatment to include: pt education, HEP, therapeutic exercises, neuromuscular re-education/balance exercises, therapeutic activities, joint mobilizations, and modalities PRN, to work towards established goals. Pt may be seen by PTA to carry out plan of care.     Viridiana Matthew, PT   07/12/2018

## 2018-07-15 ENCOUNTER — PATIENT MESSAGE (OUTPATIENT)
Dept: ENDOCRINOLOGY | Facility: CLINIC | Age: 63
End: 2018-07-15

## 2018-07-17 ENCOUNTER — CLINICAL SUPPORT (OUTPATIENT)
Dept: REHABILITATION | Facility: HOSPITAL | Age: 63
End: 2018-07-17
Attending: SPECIALIST
Payer: COMMERCIAL

## 2018-07-17 DIAGNOSIS — Z74.09 IMPAIRED FUNCTIONAL MOBILITY, BALANCE, AND ENDURANCE: ICD-10-CM

## 2018-07-17 DIAGNOSIS — F40.298 FEAR OF FALLING: ICD-10-CM

## 2018-07-17 PROCEDURE — 97112 NEUROMUSCULAR REEDUCATION: CPT | Mod: PO

## 2018-07-17 PROCEDURE — 97110 THERAPEUTIC EXERCISES: CPT | Mod: PO

## 2018-07-17 NOTE — PROGRESS NOTES
"                                                  Physical Therapy Progress Note     Name: Shira Vega  Clinic Number: [id number]  Diagnosis:   No diagnosis found.  Physician: ДМИТРИЙ Macdonald MD  Treatment Orders: PT Eval and Treat  Past Medical History:   Diagnosis Date    Basilar skull fracture 1/27/2015    Bilateral fracture of pubic rami 1/27/2015    Fracture of ribs, multiple, closed 1/27/2015    HTN (hypertension), benign     Hyperlipidemia LDL goal <160     Hypothyroidism     MVP (mitral valve prolapse)        Evaluation Date: 06/18/18  Visit #: 6  Plan of care expiration: 08/13/18  Precautions: universal, fall    FOTO 6/10     On [facility] caseload?No         Subjective   Pt reports: attempting to do some balance exercises at home; also reported that backwards walking is something that she has trouble with.     Pain Scale:  No pain    Objective     Patient received individual therapy with activities as follows:     Shira received therapeutic exercises to develop strength, endurance and flexibility for 30 minutes including:     x 12 min recumbent bike L11, CV endurance    2 x 45" standing GS stretch at incline, BUE support  2 x 15 reps standing heel raises up on on two down on one with UE sup  2 x 15 reps standing toe raises with occ UE sup    2 x 60" LLE ankle IV<>EV towel slides, seated np  2 x 10 seated LLE ankle IV<>EV c/ ytb A/AROM; VCs/TCs to prevent rotation from hip    Patient participated in neuromuscular re-education activities to improve: Balance and Coordination for  30 minutes. The following activities were included:     2 x 30" each LE in front, tandem stance on ground, Min A  X 10 each LE, SLS with alternate LE tapping 3 medium cones placed in front; CGA to occ Min A; occ touchdown support  X 10 LLE forward step + weight shift onto blue rubber disc; 1 UE support, CGA  X 10 LLE forward step + weight shift + forward anterior lean to reach target in front; 1 UE support; CGA    Weight shifting " fwd/bckw/lateral isolating ankles with verbal cues to prevent hip compensation  Backwards walking in // bars CGA c/ verbal cues to increase step length 2 laps  Backwards marching in // bars CGA c/ occ UE sup 2 laps  Backwards stair step to/reciprocal stair negotiation 1 UE support CGA    Written Home Exercises: standing heel raises, standing toes raises; both with BUE support  Pt demo good understanding of the education provided. Shira demonstrated good return demonstration of activities.     Education provided re: POC, HEP  No spiritual or educational barriers to learning provided    Pt has no cultural, educational or language barriers to learning provided.    Assessment   Pt tolerated therapy well this morning without difficulty. Therapy session continued to focus on LLE ankle motor control and LLE SLS. Pt demonstrated limited motor control when attempting to actively IV/EV L ankle and significant LLE SLS.  Pt could benefit from continued ankle stability exercises and single leg activities. Continue POC to progress overall mobility.     Pt rehab potential is Excellent. Pt will benefit from continuing skilled outpatient physical therapy to address the deficits listed below in the problem list, provide pt/family education and to maximize pt's level of independence in the home and community environment.              History  Co-morbidities and personal factors that may impact the plan of care Examination  Body Structures and Functions, activity limitations and participation restrictions that may impact the plan of care      Clinical Presentation   Co-morbidities:   h/o skull fracture, h/o intracranial hemorrhage, head contusion, non intractable episodic HA, nasal septal deviation, HTN, hyperlipidemia, hypercalcemia, hypothyroidism, vitamin D deficiency, LPR, dysphagia, osteopenia, allergic rhinitis        Personal Factors:   no deficits Body Regions:   head  lower extremities  upper extremities  trunk     Body  Systems:    gross symmetry  ROM  strength  gross coordinated movement  balance  gait  transfers  motor control                 Participation Restrictions:   Decreased safety with community mobility, particularly when negotiating steps        Activity limitations:   Learning and applying knowledge  no deficits     General Tasks and Commands  no deficits     Communication  no deficits     Mobility  walking   Stair negotiation     Self care  no deficits     Domestic Life  no deficits     Interactions/Relationships  no deficits     Life Areas  no deficits     Community and Social Life  community life  recreation and leisure             stable and uncomplicated                                low   moderate   moderate Decision Making/ Complexity Score:  low            Pt's spiritual, cultural and educational needs considered and pt agreeable to plan of care and goals as stated below:      GOALS:   Short term goals: 4 weeks, pt agrees to goals set.  1. Pt to be (I) with established HEP  2. Pt to improve GST condition 2 to at least 10 seconds for improved balance and decreased fall risk  3. Pt to improve GST condition 3 to at least 15 seconds for improved balance and decreased fall risk  4. Pt to improve GST condition 4 to at least 5 seconds for improved balance and decreased fall risk  5. Pt to improve GST condition 6 to at least 10 seconds for improved balance and decreased fall risk  6. Pt to improve FGA score to at least 23/30 for improved balance and decreased fall risk  7. Pt to improve RLE SLS score to at least 8 seconds for improved safety with standing ADL  8. Pt to improve LLE SLS score to at least 3 seconds for improved safety with standing ADL     Long term goals: 8 weeks, pt agrees to goals set  9. Pt to improve GST condition 2 to at least 15 seconds for improved balance and decreased fall risk  10. Pt to improve GST condition 3 to at least 20 seconds for improved balance and decreased fall risk  11. Pt to  improve GST condition 4 to at least 10 seconds for improved balance and decreased fall risk  12. Pt to improve GST condition 6 to at least 15 seconds for improved balance and decreased fall risk  13. Pt to improve FGA score to at least 26/30 for improved balance and decreased fall risk  14. Pt to improve RLE SLS score to at least 10 seconds for improved safety with standing ADL  15. Pt to improve LLE SLS score to at least 5 seconds for improved safety with standing ADL  16. Pt to improve FOTO score to at least 81% for improved self concept of functional mobility.         Plan   Continue PT 1-2x weekly under established Plan of Care, with treatment to include: pt education, HEP, therapeutic exercises, neuromuscular re-education/balance exercises, therapeutic activities, joint mobilizations, and modalities PRN, to work towards established goals. Pt may be seen by PTA to carry out plan of care.     Eder Carr, PTA   07/17/2018

## 2018-07-19 ENCOUNTER — CLINICAL SUPPORT (OUTPATIENT)
Dept: REHABILITATION | Facility: HOSPITAL | Age: 63
End: 2018-07-19
Attending: SPECIALIST
Payer: COMMERCIAL

## 2018-07-19 DIAGNOSIS — F40.298 FEAR OF FALLING: ICD-10-CM

## 2018-07-19 DIAGNOSIS — Z74.09 IMPAIRED FUNCTIONAL MOBILITY, BALANCE, AND ENDURANCE: ICD-10-CM

## 2018-07-19 PROCEDURE — 97110 THERAPEUTIC EXERCISES: CPT | Mod: PO

## 2018-07-19 PROCEDURE — 97112 NEUROMUSCULAR REEDUCATION: CPT | Mod: PO

## 2018-07-19 NOTE — PROGRESS NOTES
"                                                  Physical Therapy Progress Note     Name: Shira Vega  Clinic Number: [id number]  Diagnosis:   Encounter Diagnoses   Name Primary?    Impaired functional mobility, balance, and endurance     Fear of falling      Physician: ДМИТРИЙ Macdonald MD  Treatment Orders: PT Eval and Treat  Past Medical History:   Diagnosis Date    Basilar skull fracture 1/27/2015    Bilateral fracture of pubic rami 1/27/2015    Fracture of ribs, multiple, closed 1/27/2015    HTN (hypertension), benign     Hyperlipidemia LDL goal <160     Hypothyroidism     MVP (mitral valve prolapse)        Evaluation Date: 06/18/18  Visit #: 7  Plan of care expiration: 08/13/18  Precautions: universal, fall    FOTO 7     On ST caseload?No         Subjective   Pt reports: having cataract surgery on Tuesday   PT instructed patient that she will need to check with MD regarding return to therapy.   Pain Scale:  No pain    Objective     Patient received individual therapy with activities as follows:     Shira received therapeutic exercises to develop strength, endurance and flexibility for 30 minutes including:     Lower Extremity Strength  Right LE  evaluation 7/19/18 Left LE  evaluation 7/19/18   Hip Flexion: 4+/5 5/5 Hip Flexion: 4+/5 4+/5   Hip Extension:  4/5 4+/5 Hip Extension: 4/5 4+/5   Hip Abduction: 4+/5 5/5 Hip Abduction: 4+/5 5/5   Hip Adduction: 5/5 5/5 Hip Adduction 5/5 5/5   Knee Extension: 5/5 5/5 Knee Extension: 5/5 5/5   Knee Flexion: 4+/5 5/5 Knee Flexion: 4+/5 5/5   Ankle Dorsiflexion: 5/5 5/5 Ankle Dorsiflexion: 5/5 5/5   Ankle Plantarflexion: 5/5 5/5 Ankle Plantarflexion: 5/5 5/5   Ankle Inversion -- 5/5 Ankle Inversion -- 4/5   Ankle Eversion -- 5/5 Ankle Eversion -- 4/5      X 10 min recumbent stepper L5 for CV endurance and BLE strengthening    SSWS: 0.86 m/sec (6m/7s)    2 x 30" standing GS stretch at incline, BUE support    Patient participated in neuromuscular re-education " "activities to improve: Balance and Coordination for  15 minutes. The following activities were included:     JENNIFER SENSORY TESTING:  (P= Pass, F= Fail; note any sway; hold each position for 30")  Condition 1: (firm surface/feet together/eyes open) P  Condition 2: (firm surface/feet together/eyes closed) F, 26 sec  Condition 3: (firm surface/feet in tandem/eyes open) P, 30 sec  Condition 4: (firm surface/feet in tandem/eyes closed) F, 5 sec  Condition 5: (soft surface/feet together/eyes open) P  Condition 6: (soft surface/feet together/eyes closed) F, 12 sec  Condition 7: (Fakuda step test), measure distance varied from center starting position NT     Functional Gait Assessment:   1. Gait on level surface =  2  2. Change in Gait Speed = 3  3. Gait with horizontal head turns  = 3  4. Gait with vertical head turns = 3  5. Gait with pivot turns = 2  6. Step over obstacle = 2  7. Gait with Narrow GAYATRI = 2  8. Gait with eyes closed = 2  9. Ambulating Backwards = 3  10. Steps = 2     Score 24/30        Evaluation 07/19/18   Single Limb Stance R LE 4 sec  (<10 sec = HIGH FALL RISK) 8 sec  (<10 sec = HIGH FALL RISK)   Single Limb Stance L LE 1 sec  (<10 sec = HIGH FALL RISK) 3 sec  (<10 sec = HIGH FALL RISK)   30 second Chair Rise 17 completed with no arms NT         Backwards stair step to/reciprocal stair negotiation 1 UE support CGA x multiple trials    Written Home Exercises: standing heel raises, standing toes raises; both with BUE support  Pt demo good understanding of the education provided. Shira demonstrated good return demonstration of activities.     Education provided re: POC, HEP  No spiritual or educational barriers to learning provided    Pt has no cultural, educational or language barriers to learning provided.    Assessment   Pt tolerated therapy well this afternoon without difficulty. Pt reassessed this date to assess progress with therapy. Shira has made excellent progress with therapy. She demonstrates " improved BLE strength compared to evaluation. Ankle EV and IV formally tested. L ankle EV and IV both weaker compared to RLE which contributes to decreased balance and ambulation deficits. Shira demonstrates significantly improved FGA score, although she does remain at an increased fall risk at this time. Additionally, GST scores also improved from evaluation; Shira continues to demonstrate difficulty with tandem stance with vision eliminated and standing on soft surface with vision eliminated. During vision eliminated activities, pt appears more guarded with mobility. SLS scores for both LEs improved from evaluation, but LLE SLS score remains significantly decreased compared to RLE. Pt to benefit from continued PT intervention to further progress remaining deficits.  Continue POC to progress overall mobility.     Pt rehab potential is Excellent. Pt will benefit from continuing skilled outpatient physical therapy to address the deficits listed below in the problem list, provide pt/family education and to maximize pt's level of independence in the home and community environment.              History  Co-morbidities and personal factors that may impact the plan of care Examination  Body Structures and Functions, activity limitations and participation restrictions that may impact the plan of care      Clinical Presentation   Co-morbidities:   h/o skull fracture, h/o intracranial hemorrhage, head contusion, non intractable episodic HA, nasal septal deviation, HTN, hyperlipidemia, hypercalcemia, hypothyroidism, vitamin D deficiency, LPR, dysphagia, osteopenia, allergic rhinitis        Personal Factors:   no deficits Body Regions:   head  lower extremities  upper extremities  trunk     Body Systems:    gross symmetry  ROM  strength  gross coordinated movement  balance  gait  transfers  motor control                 Participation Restrictions:   Decreased safety with community mobility, particularly when negotiating steps         Activity limitations:   Learning and applying knowledge  no deficits     General Tasks and Commands  no deficits     Communication  no deficits     Mobility  walking   Stair negotiation     Self care  no deficits     Domestic Life  no deficits     Interactions/Relationships  no deficits     Life Areas  no deficits     Community and Social Life  community life  recreation and leisure             stable and uncomplicated                                low   moderate   moderate Decision Making/ Complexity Score:  low            Pt's spiritual, cultural and educational needs considered and pt agreeable to plan of care and goals as stated below:      GOALS:   Short term goals: 4 weeks, pt agrees to goals set.  1. Pt to be (I) with established HEP MET  2. Pt to improve GST condition 2 to at least 10 seconds for improved balance and decreased fall risk MET  3. Pt to improve GST condition 3 to at least 15 seconds for improved balance and decreased fall risk MET  4. Pt to improve GST condition 4 to at least 5 seconds for improved balance and decreased fall risk MET  5. Pt to improve GST condition 6 to at least 10 seconds for improved balance and decreased fall risk MET  6. Pt to improve FGA score to at least 23/30 for improved balance and decreased fall risk MET  7. Pt to improve RLE SLS score to at least 8 seconds for improved safety with standing ADL MET  8. Pt to improve LLE SLS score to at least 3 seconds for improved safety with standing ADL MET     Long term goals: 8 weeks, pt agrees to goals set  9. Pt to improve GST condition 2 to at least 15 seconds for improved balance and decreased fall risk  10. Pt to improve GST condition 3 to at least 20 seconds for improved balance and decreased fall risk  11. Pt to improve GST condition 4 to at least 10 seconds for improved balance and decreased fall risk  12. Pt to improve GST condition 6 to at least 15 seconds for improved balance and decreased fall risk  13. Pt to  improve FGA score to at least 26/30 for improved balance and decreased fall risk  14. Pt to improve RLE SLS score to at least 10 seconds for improved safety with standing ADL  15. Pt to improve LLE SLS score to at least 5 seconds for improved safety with standing ADL  16. Pt to improve FOTO score to at least 81% for improved self concept of functional mobility.         Plan   Continue PT 1-2x weekly under established Plan of Care, with treatment to include: pt education, HEP, therapeutic exercises, neuromuscular re-education/balance exercises, therapeutic activities, joint mobilizations, and modalities PRN, to work towards established goals. Pt may be seen by PTA to carry out plan of care.     Viridiana Matthew, PT   07/19/2018

## 2018-07-31 RX ORDER — BISOPROLOL FUMARATE 10 MG/1
TABLET, FILM COATED ORAL
Qty: 30 TABLET | Refills: 0 | Status: SHIPPED | OUTPATIENT
Start: 2018-07-31 | End: 2018-08-24 | Stop reason: SDUPTHER

## 2018-08-08 ENCOUNTER — OFFICE VISIT (OUTPATIENT)
Dept: ENDOCRINOLOGY | Facility: CLINIC | Age: 63
End: 2018-08-08
Payer: COMMERCIAL

## 2018-08-08 VITALS
HEART RATE: 56 BPM | WEIGHT: 129 LBS | HEIGHT: 63 IN | SYSTOLIC BLOOD PRESSURE: 120 MMHG | DIASTOLIC BLOOD PRESSURE: 88 MMHG | BODY MASS INDEX: 22.86 KG/M2

## 2018-08-08 DIAGNOSIS — E83.52 HYPERCALCEMIA: Primary | ICD-10-CM

## 2018-08-08 DIAGNOSIS — M85.80 OSTEOPENIA, UNSPECIFIED LOCATION: ICD-10-CM

## 2018-08-08 DIAGNOSIS — E03.9 HYPOTHYROIDISM, UNSPECIFIED TYPE: ICD-10-CM

## 2018-08-08 PROCEDURE — 99999 PR PBB SHADOW E&M-EST. PATIENT-LVL IV: CPT | Mod: PBBFAC,,, | Performed by: INTERNAL MEDICINE

## 2018-08-08 PROCEDURE — 3074F SYST BP LT 130 MM HG: CPT | Mod: CPTII,S$GLB,, | Performed by: INTERNAL MEDICINE

## 2018-08-08 PROCEDURE — 99214 OFFICE O/P EST MOD 30 MIN: CPT | Mod: S$GLB,,, | Performed by: INTERNAL MEDICINE

## 2018-08-08 PROCEDURE — 3008F BODY MASS INDEX DOCD: CPT | Mod: CPTII,S$GLB,, | Performed by: INTERNAL MEDICINE

## 2018-08-08 PROCEDURE — 3079F DIAST BP 80-89 MM HG: CPT | Mod: CPTII,S$GLB,, | Performed by: INTERNAL MEDICINE

## 2018-08-08 RX ORDER — KETOROLAC TROMETHAMINE 5 MG/ML
SOLUTION OPHTHALMIC
Refills: 0 | COMMUNITY
Start: 2018-06-06 | End: 2018-11-30 | Stop reason: SDUPTHER

## 2018-08-08 RX ORDER — CIPROFLOXACIN HYDROCHLORIDE 3 MG/ML
SOLUTION/ DROPS OPHTHALMIC
Refills: 0 | COMMUNITY
Start: 2018-06-06 | End: 2018-11-30 | Stop reason: SDUPTHER

## 2018-08-08 RX ORDER — PREDNISOLONE ACETATE 10 MG/ML
SUSPENSION/ DROPS OPHTHALMIC
Refills: 0 | COMMUNITY
Start: 2018-06-06 | End: 2018-11-30 | Stop reason: SDUPTHER

## 2018-08-08 NOTE — PROGRESS NOTES
Subjective:     Patient ID: Shira Vega is a 62 y.o. female.    Chief Complaint: Follow-up    HPI:   Ms. Vega is a 62 y.o. female who is here for a follow-up visit for evaluation hypercalcemia that seemed to have improved after removal of HCTZ. However her more recent serum calcium levels range  between 10.1 - 10.7 mg/dl. Last DXA scan from 2016 showed she has mild osteopenia; FRAX does not recommend treatment.     She denies constipation, depressed mood. One fall in the past year but denies fractures, kidney stones, and kidney function is normal and stable.     Other labs:  Vitamin D levels are normal (53 ng/ml)   PTH levels range between 62 - 85 pg/ml.  Urinary calcium 45 mg/specimen (10/2016)    Supplements:  Vitamin D    Also diagnosed with hypothyroidism on replacement levothyroxine 50 mcg daily. Pt denies fatigue, weight gain, or cold intolerance.      Review of Systems   Constitutional: Negative for chills, fatigue and fever.   HENT: Positive for postnasal drip and sinus pain (F/U with ENT later this month).    Eyes: Negative for pain and discharge.   Respiratory: Negative for chest tightness and shortness of breath.    Cardiovascular: Negative for chest pain, palpitations and leg swelling.   Gastrointestinal: Negative for abdominal pain, constipation, diarrhea and nausea.   Endocrine: Negative for cold intolerance and heat intolerance.   Musculoskeletal: Negative for gait problem and myalgias.   Neurological: Negative for tremors and weakness.   Psychiatric/Behavioral: Negative for agitation and confusion.     Objective:     Physical Exam   Constitutional: She is oriented to person, place, and time. She appears well-developed and well-nourished. No distress.   HENT:   Head: Normocephalic and atraumatic.   Eyes: Conjunctivae and EOM are normal. Pupils are equal, round, and reactive to light. Right eye exhibits no discharge. Left eye exhibits no discharge.   Neck: Neck supple. No tracheal deviation  present. No thyromegaly present.   Cardiovascular: Normal rate, regular rhythm and normal heart sounds.    Pulmonary/Chest: Effort normal and breath sounds normal. No respiratory distress.   Abdominal: Soft. Bowel sounds are normal. She exhibits no distension and no mass. There is no tenderness. There is no guarding.   Musculoskeletal: She exhibits no edema.   Neurological: She is alert and oriented to person, place, and time.   Skin: Skin is warm and dry. No rash noted. She is not diaphoretic. No erythema.   Psychiatric: She has a normal mood and affect. Her behavior is normal.         Assessment/Plan:     Problem List Items Addressed This Visit     Hypothyroidism     No change in symptoms, continue current dose of T4         Hypercalcemia - Primary     Calcium WNL after stopping HCTZ, Repeat PTH         Osteopenia     Repeat DXA  PTH   Renal function panel  Pt to start multivitamin w/ Calcium             Emanuel Montiel DO  08/08/2018 10:36 AM

## 2018-08-08 NOTE — PATIENT INSTRUCTIONS
Repeat DXA scan to check for bone loss; Repeat Labs (to be done in [place])  Take daily multivitamin w/ calcium

## 2018-08-09 ENCOUNTER — HOSPITAL ENCOUNTER (OUTPATIENT)
Dept: RADIOLOGY | Facility: HOSPITAL | Age: 63
Discharge: HOME | End: 2018-08-09
Attending: FAMILY MEDICINE
Payer: COMMERCIAL

## 2018-08-09 VITALS — BODY MASS INDEX: 22.68 KG/M2 | WEIGHT: 128 LBS | HEIGHT: 63 IN

## 2018-08-09 DIAGNOSIS — Z12.39 SCREENING FOR BREAST CANCER: ICD-10-CM

## 2018-08-09 PROCEDURE — 77067 SCR MAMMO BI INCL CAD: CPT | Mod: TC,PO

## 2018-08-09 PROCEDURE — 77063 BREAST TOMOSYNTHESIS BI: CPT | Mod: 26,,, | Performed by: RADIOLOGY

## 2018-08-09 PROCEDURE — 77067 SCR MAMMO BI INCL CAD: CPT | Mod: 26,,, | Performed by: RADIOLOGY

## 2018-08-12 NOTE — PROGRESS NOTES
Ms. Vega is a 62 year old woman with biochemical evidence of mild primary hyperparathyroidism, based on elevated calcium and PTH.     No recent labs.   Serum calcium improved off HCTZ, blood pressure is normal.   Repeat DXA scan and labs.     May consider repeat 24 hr urine (previously low excretion -- [facility]?)    I have personally taken the history and examined this patient and agree with the resident's note as stated above.

## 2018-08-13 ENCOUNTER — LAB VISIT (OUTPATIENT)
Dept: LAB | Facility: HOSPITAL | Age: 63
End: 2018-08-13
Attending: INTERNAL MEDICINE
Payer: COMMERCIAL

## 2018-08-13 DIAGNOSIS — M85.80 OSTEOPENIA, UNSPECIFIED LOCATION: ICD-10-CM

## 2018-08-13 LAB
ALBUMIN SERPL BCP-MCNC: 4.1 G/DL
ANION GAP SERPL CALC-SCNC: 8 MMOL/L
BUN SERPL-MCNC: 18 MG/DL
CALCIUM SERPL-MCNC: 10.9 MG/DL
CHLORIDE SERPL-SCNC: 105 MMOL/L
CO2 SERPL-SCNC: 27 MMOL/L
CREAT SERPL-MCNC: 0.9 MG/DL
EST. GFR  (AFRICAN AMERICAN): >60 ML/MIN/1.73 M^2
EST. GFR  (NON AFRICAN AMERICAN): >60 ML/MIN/1.73 M^2
GLUCOSE SERPL-MCNC: 83 MG/DL
PHOSPHATE SERPL-MCNC: 2.5 MG/DL
POTASSIUM SERPL-SCNC: 5.1 MMOL/L
PTH-INTACT SERPL-MCNC: 63 PG/ML
SODIUM SERPL-SCNC: 140 MMOL/L

## 2018-08-13 PROCEDURE — 80069 RENAL FUNCTION PANEL: CPT

## 2018-08-13 PROCEDURE — 36415 COLL VENOUS BLD VENIPUNCTURE: CPT | Mod: PO

## 2018-08-13 PROCEDURE — 83970 ASSAY OF PARATHORMONE: CPT

## 2018-08-16 ENCOUNTER — PATIENT MESSAGE (OUTPATIENT)
Dept: ENDOCRINOLOGY | Facility: CLINIC | Age: 63
End: 2018-08-16

## 2018-08-16 DIAGNOSIS — E83.52 HYPERCALCEMIA: Primary | ICD-10-CM

## 2018-08-16 NOTE — TELEPHONE ENCOUNTER
Hypercalcemia and hypophosphatemia with inappropriately normal serum PTH  Vitamin D levels in the past normal (53 ng/ml)  24 hr urine for calcium 45 mg/specimen (2016)  Osteopenia (1/3 distal radius not done) with FRAX calculation that does not support treatment    PLAN:  No surgical indications  Not on HCTZ  Drink plenty of fluids  Continue vitamin D  F/u in one year    Results for orders placed or performed in visit on 08/13/18   PTH, intact   Result Value Ref Range    PTH, Intact 63.0 9.0 - 77.0 pg/mL   Renal function panel   Result Value Ref Range    Glucose 83 70 - 110 mg/dL    Sodium 140 136 - 145 mmol/L    Potassium 5.1 3.5 - 5.1 mmol/L    Chloride 105 95 - 110 mmol/L    CO2 27 23 - 29 mmol/L    BUN, Bld 18 8 - 23 mg/dL    Calcium 10.9 (H) 8.7 - 10.5 mg/dL    Creatinine 0.9 0.5 - 1.4 mg/dL    Albumin 4.1 3.5 - 5.2 g/dL    Phosphorus 2.5 (L) 2.7 - 4.5 mg/dL    eGFR if African American >60.0 >60 mL/min/1.73 m^2    eGFR if non African American >60.0 >60 mL/min/1.73 m^2    Anion Gap 8 8 - 16 mmol/L

## 2018-08-20 ENCOUNTER — TELEPHONE (OUTPATIENT)
Dept: FAMILY MEDICINE | Facility: CLINIC | Age: 63
End: 2018-08-20

## 2018-08-20 NOTE — TELEPHONE ENCOUNTER
----- Message from Jaquelin Guallpa sent at 8/20/2018 12:48 PM CDT -----  Contact: pt            Name of Who is Calling: BALWINDER URBANO [[id number]]      What is the request in detail:pt calling to schedule eye surgery pre op/ clearance appt.. Please advise      Can the clinic reply by [facility]: no      What Number to [facility] Back [facility]: [phone]

## 2018-08-24 RX ORDER — BISOPROLOL FUMARATE 10 MG/1
TABLET, FILM COATED ORAL
Qty: 30 TABLET | Refills: 0 | Status: SHIPPED | OUTPATIENT
Start: 2018-08-24 | End: 2018-09-10 | Stop reason: SDUPTHER

## 2018-08-24 RX ORDER — BISOPROLOL FUMARATE 10 MG/1
TABLET, FILM COATED ORAL
Qty: 30 TABLET | Refills: 0 | Status: SHIPPED | OUTPATIENT
Start: 2018-08-24 | End: 2018-09-23 | Stop reason: SDUPTHER

## 2018-08-28 ENCOUNTER — OFFICE VISIT (OUTPATIENT)
Dept: OTOLARYNGOLOGY | Facility: CLINIC | Age: 63
End: 2018-08-28
Payer: COMMERCIAL

## 2018-08-28 VITALS
BODY MASS INDEX: 22.69 KG/M2 | HEIGHT: 63 IN | HEART RATE: 52 BPM | SYSTOLIC BLOOD PRESSURE: 145 MMHG | TEMPERATURE: 98 F | WEIGHT: 128.06 LBS | DIASTOLIC BLOOD PRESSURE: 72 MMHG

## 2018-08-28 DIAGNOSIS — K21.9 LARYNGOPHARYNGEAL REFLUX (LPR): Primary | ICD-10-CM

## 2018-08-28 DIAGNOSIS — J34.2 NASAL SEPTAL DEVIATION: ICD-10-CM

## 2018-08-28 DIAGNOSIS — J30.9 ALLERGIC RHINITIS, UNSPECIFIED SEASONALITY, UNSPECIFIED TRIGGER: ICD-10-CM

## 2018-08-28 DIAGNOSIS — R13.10 DYSPHAGIA, UNSPECIFIED TYPE: ICD-10-CM

## 2018-08-28 DIAGNOSIS — J30.0 VASOMOTOR RHINITIS: ICD-10-CM

## 2018-08-28 PROCEDURE — 31575 DIAGNOSTIC LARYNGOSCOPY: CPT | Mod: S$GLB,,, | Performed by: SPECIALIST

## 2018-08-28 PROCEDURE — 99214 OFFICE O/P EST MOD 30 MIN: CPT | Mod: 25,S$GLB,, | Performed by: SPECIALIST

## 2018-08-28 PROCEDURE — 3077F SYST BP >= 140 MM HG: CPT | Mod: CPTII,S$GLB,, | Performed by: SPECIALIST

## 2018-08-28 PROCEDURE — 3008F BODY MASS INDEX DOCD: CPT | Mod: CPTII,S$GLB,, | Performed by: SPECIALIST

## 2018-08-28 PROCEDURE — 3078F DIAST BP <80 MM HG: CPT | Mod: CPTII,S$GLB,, | Performed by: SPECIALIST

## 2018-08-28 NOTE — PROCEDURES
"Laryngoscopy  Date/Time: 8/28/2018 11:31 AM  Performed by: ДМИТРИЙ Macdonald MD  Authorized by: ДМИТРИЙ Macdonald MD     Time out: Immediately prior to procedure a "time out" was called to verify the correct patient, procedure, equipment, support staff and site/side marked as required.    Consent Done?:  Yes (Verbal)  Anesthesia:     Local anesthetic:  Lidocaine 2% and Juan-Synephrine 1/2% (Topical aerosol)    Patient tolerance:  Patient tolerated the procedure well with no immediate complications    Decongestion performed?: Yes    Laryngoscopy:     Areas examined:  Nasal cavities, nasopharynx, oropharynx, hypopharynx, larynx and vocal cords    Laryngoscope size:  4 mm (Flexible video nasolaryngoscopy)  Nose External:      No external nasal deformity  Nose Intranasal:      Mucosa no polyps     Mucosa ulcers not present     No mucosa lesions present (Clear mucus in the nasal passages bilaterally)     Septum gross deformity ( as septum deviated to the left with bony spur impacting middle meatus on the left)     Enlarged turbinates ( inferior turbinates enlarged bilaterally)  Nasopharynx:      No mucosa lesions     Adenoids not present     Posterior choanae patent     Eustachian tube patent  Larynx/hypopharynx:      No epiglottis lesions     No epiglottis edema     No AE folds lesions     No vocal cord polyps     Equal and normal bilateral ( vocal cords move symmetrically, no mucosal lesions noted)     No hypopharynx lesions     No piriform sinus pooling     No piriform sinus lesions     Post cricoid edema ( moderate, significantly improved from last endoscopy)     Post cricoid erythema ( moderate, improved from last endoscopy)     Nasolaryngoscopy findings of nasal septal deviation and laryngopharyngeal reflux with significant improvement from last examination 3 months ago      "

## 2018-08-28 NOTE — PROGRESS NOTES
Subjective:       Patient ID: Shira Vega is a 62 y.o. female.    Chief Complaint: Follow-up    The patient is coming in for follow-up visit.  There are multiple issues to discuss:  1.  She is taking Zantac twice daily for her laryngopharyngeal reflux.  She still has some throat clearing but feels that situation much better.  Her voice is significantly stronger.  It does we can and crack with prolonged talking.  2.  If she uses her Atrovent the vasomotor rhinitis symptoms are controlled.  She has very good about using at night but does show quite irregularly at other times.  3.  Her  chronic imbalance is minimally improved.  She did stop doing the vestibular rehab therapy because of cataract surgery. Her right eye was done on July 24th and the left eye is scheduled to be done on September 18th.  She feels that her vision is much better in the right eye.  She is uncertain as to whether it is help with her balance.  4.  The area of swelling in the right cheek area continues to decrease.  She uses warm compresses every other day or so.      Review of Systems   Constitutional: Positive for fatigue. Negative for activity change, appetite change, chills, fever and unexpected weight change.   HENT: Positive for congestion, facial swelling, postnasal drip, rhinorrhea, trouble swallowing and voice change. Negative for ear discharge, ear pain, hearing loss, mouth sores, sinus pressure, sinus pain, sneezing, sore throat and tinnitus.    Eyes: Positive for visual disturbance. Negative for photophobia, pain, discharge, redness and itching.   Respiratory: Positive for cough. Negative for apnea, choking, shortness of breath and wheezing.    Cardiovascular: Negative for chest pain and palpitations.   Gastrointestinal: Negative for abdominal distention, abdominal pain, nausea and vomiting.   Musculoskeletal: Negative for arthralgias, myalgias, neck pain and neck stiffness.   Skin: Negative.  Negative for color change, pallor and  rash.   Allergic/Immunologic: Negative for environmental allergies, food allergies and immunocompromised state.   Neurological: Positive for headaches. Negative for dizziness, facial asymmetry, speech difficulty, weakness, light-headedness and numbness.   Hematological: Negative for adenopathy. Does not bruise/bleed easily.   Psychiatric/Behavioral: Negative for confusion, decreased concentration and sleep disturbance.       Objective:      Physical Exam   Constitutional: She is oriented to person, place, and time. She appears well-developed and well-nourished. She is cooperative.   HENT:   Head: Normocephalic.   Right Ear: External ear and ear canal normal. Tympanic membrane is retracted.   Left Ear: External ear and ear canal normal. Tympanic membrane is retracted.   Nose: Mucosal edema (cyanotic, boggy inferior turbinates bilaterally), rhinorrhea (clear mucus bilaterally) and septal deviation (To the left) present.   Mouth/Throat: Uvula is midline, oropharynx is clear and moist and mucous membranes are normal. No oral lesions.   Face-minimal subcutaneous thickening along the midportion of the right nasolabial fold, significantly decreased from last examination   Eyes: EOM and lids are normal. Pupils are equal, round, and reactive to light. Right eye exhibits no discharge and no exudate. Left eye exhibits no discharge and no exudate. Right conjunctiva is injected. Left conjunctiva is injected.   Neck: Trachea normal and normal range of motion. No muscular tenderness present. Carotid bruit is not present. No tracheal deviation present. No thyroid mass and no thyromegaly present.   Cardiovascular: Normal rate, regular rhythm, normal heart sounds and normal pulses.   Pulmonary/Chest: Effort normal and breath sounds normal. No stridor. She has no decreased breath sounds. She has no wheezes. She has no rhonchi. She has no rales.   Abdominal: Soft. Bowel sounds are normal. There is no tenderness.   Musculoskeletal:  Normal range of motion.   Lymphadenopathy:        Head (right side): No submental, no submandibular, no preauricular, no posterior auricular and no occipital adenopathy present.        Head (left side): No submental, no submandibular, no preauricular, no posterior auricular and no occipital adenopathy present.     She has no cervical adenopathy.   Neurological: She is alert and oriented to person, place, and time. She has normal strength. No cranial nerve deficit or sensory deficit. Gait normal.   Neuro otologic-no nystagmus, mildly wide-based gait   Skin: Skin is warm and dry. No petechiae and no rash noted. No cyanosis. Nails show no clubbing.   Psychiatric: She has a normal mood and affect. Her speech is normal and behavior is normal. Judgment and thought content normal. Cognition and memory are normal.       Flexible video nasolaryngoscopy- septal spur and packs middle meatus on the left, significant improvement in edema and erythema in the arytenoids and  interarytenoid mucosa    Assessment:       1. Laryngopharyngeal reflux (LPR)    2. Dysphagia, unspecified type    3. Vasomotor rhinitis    4. Allergic rhinitis, unspecified seasonality, unspecified trigger    5. Nasal septal deviation        Plan:       I am pleased with the patient's progress.  She will continue with twice daily Zantac and ipratropium bromide as needed.  After she has her 2nd cataract done I will have her resume the vestibular rehab therapy.

## 2018-09-10 ENCOUNTER — OFFICE VISIT (OUTPATIENT)
Dept: FAMILY MEDICINE | Facility: CLINIC | Age: 63
End: 2018-09-10
Attending: FAMILY MEDICINE
Payer: COMMERCIAL

## 2018-09-10 VITALS
BODY MASS INDEX: 23.01 KG/M2 | WEIGHT: 129.88 LBS | OXYGEN SATURATION: 98 % | DIASTOLIC BLOOD PRESSURE: 70 MMHG | HEIGHT: 63 IN | SYSTOLIC BLOOD PRESSURE: 138 MMHG | HEART RATE: 57 BPM

## 2018-09-10 DIAGNOSIS — H26.9 CATARACT, UNSPECIFIED CATARACT TYPE, UNSPECIFIED LATERALITY: ICD-10-CM

## 2018-09-10 DIAGNOSIS — Z86.79 HISTORY OF INTRACRANIAL HEMORRHAGE: ICD-10-CM

## 2018-09-10 DIAGNOSIS — Z01.818 PREOP EXAMINATION: Primary | ICD-10-CM

## 2018-09-10 DIAGNOSIS — I10 HTN (HYPERTENSION), BENIGN: ICD-10-CM

## 2018-09-10 DIAGNOSIS — E03.9 HYPOTHYROIDISM, UNSPECIFIED TYPE: ICD-10-CM

## 2018-09-10 PROCEDURE — 99213 OFFICE O/P EST LOW 20 MIN: CPT | Mod: S$GLB,,, | Performed by: FAMILY MEDICINE

## 2018-09-10 PROCEDURE — 3078F DIAST BP <80 MM HG: CPT | Mod: CPTII,S$GLB,, | Performed by: FAMILY MEDICINE

## 2018-09-10 PROCEDURE — 99999 PR PBB SHADOW E&M-EST. PATIENT-LVL III: CPT | Mod: PBBFAC,,, | Performed by: FAMILY MEDICINE

## 2018-09-10 PROCEDURE — 3008F BODY MASS INDEX DOCD: CPT | Mod: CPTII,S$GLB,, | Performed by: FAMILY MEDICINE

## 2018-09-10 PROCEDURE — 3075F SYST BP GE 130 - 139MM HG: CPT | Mod: CPTII,S$GLB,, | Performed by: FAMILY MEDICINE

## 2018-09-10 NOTE — PROGRESS NOTES
Subjective:       Shira Vega is a 62 y.o. female who presents to the office today for a preoperative consultation at the request of surgeon Dr. Thiago Morton who plans on performing ICCE/IOL OS on September 18. This consultation is requested for the specific conditions prompting preoperative evaluation. Planned anesthesia: modified.  Patients bleeding risk: no recent abnormal bleeding, no remote history of abnormal bleeding and no use of Ca-channel blockers.         Patient Active Problem List   Diagnosis    HTN (hypertension), benign    Hyperlipidemia    Hypothyroidism    History of skull fracture    History of intracranial hemorrhage    [place] cardiac risk <10% in next 10 years    Hypercalcemia    Vitamin D deficiency    Osteopenia    Head contusion    Laryngopharyngeal reflux (LPR)    Dysphagia    Allergic rhinitis    Nasal septal deviation    Nonintractable episodic headache    Vasomotor rhinitis    Chronic throat clearing    Impaired functional mobility, balance, and endurance    Fear of falling               Past Surgical History:   Procedure Laterality Date    FOOT SURGERY        WISDOM TOOTH EXTRACTION                Current Outpatient Prescriptions:     aspirin (ASPIRIN LOW DOSE) 81 MG EC tablet, Take 1 tablet by mouth., Disp: , Rfl:     bisoprolol (ZEBETA) 10 MG tablet, Take 1 tablet (10 mg total) by mouth once daily., Disp: 30 tablet, Rfl: 6    cholecalciferol, vitamin D3, 1,000 unit capsule, Take 1 capsule (1,000 Units total) by mouth once daily., Disp: , Rfl: 0    ipratropium (ATROVENT) 0.03 % nasal spray, Two sprays in each nostril before going to bed every night, and 1-2 sprays each nostril prior to eating, Disp: 30 mL, Rfl: 11    levothyroxine (SYNTHROID) 50 MCG tablet, take 1 tablet by mouth every morning ON AN EMPTY STOMACH, Disp: 90 tablet, Rfl: 2    pravastatin (PRAVACHOL) 20 MG tablet, take 1 tablet by mouth once daily, Disp: 90 tablet, Rfl: 2     ranitidine (ZANTAC) 150 MG tablet, Take 1 tablet (150 mg total) by mouth 2 (two) times daily., Disp: 30 tablet, Rfl: 11    VIMPAT 150 mg Tab, , Disp: , Rfl: 0          Review of patient's allergies indicates:   Allergen Reactions    No known allergies                 Family History   Problem Relation Age of Onset    Cancer Mother           Lung    Hypertension Mother      Thyroid disease Mother      Osteoarthritis Maternal Grandmother      Hypertension Maternal Grandmother      Cancer Maternal Grandfather           colon         Social History               Social History    Marital status:        Spouse name: N/A    Number of children: 1    Years of education: N/A            Occupational History      [employer]       retired             Social History Main Topics    Smoking status: Former Smoker       Types: Cigarettes       Quit date: 1998    Smokeless tobacco: Never Used         Comment: smoked when in college, a couple cigs. when she went out    Alcohol use 3.5 oz/week       7 Standard drinks or equivalent per week         Comment: drinks glass of wine or cocktail in the evening    Drug use: No    Sexual activity: Yes       Partners: Male         Comment:            Other Topics Concern    Not on file          Social History Narrative     The patient does exercise regularly (TIW: spinning, light resistance).       Rates diet as good.       She is not satisfied with weight.      with 1 child     She does drink at least 1/2 gallon water daily.     She drinks 0 coffee/tea/caffeine-containing soft drinks daily.     Total sleep time at night is 8-10 hours.     She no longer work- retired.     She does wear seat belts.     Hobbies include none.                                       OB History       Para Term  AB Living     1 1             SAB TAB Ectopic Multiple Live Births                         Obstetric Comments     Menarche age 11.  LMP age 53.  First  "child born age 33.  History of abnormal PAP smear: NO.  History of abnormal mammogram: NO.  History of sexually transmitted disease:  NO     Breast Cancer Risk:  5-year Risk: Patient 2.1%, Average1.9%  Lifetime Risk: P atient 9.4%, Average 8.6%                   Patient Care Team:  Justen Barragan Jr., MD as PCP - General (Family Medicine)  Germaine Fish LPN as Care Coordinator     Answers for HPI/ROS submitted by the patient on 9/7/2018   activity change: No  unexpected weight change: No  neck pain: No  hearing loss: No  rhinorrhea: No  trouble swallowing: No  eye discharge: No  visual disturbance: No  chest tightness: No  wheezing: No  chest pain: No  palpitations: No  blood in stool: No  constipation: No  vomiting: No  diarrhea: No  polydipsia: No  polyuria: No  difficulty urinating: No  hematuria: No  menstrual problem: No  dysuria: No  joint swelling: No  arthralgias: No  headaches: No  weakness: No  confusion: No  dysphoric mood: No         Objective:      /70 (BP Location: Right arm, Patient Position: Sitting, BP Method: Small (Manual))   Pulse (!) 57   Ht 5' 3" (1.6 m)   Wt 58.9 kg (129 lb 14.4 oz)   SpO2 98%   BMI 23.01 kg/m²      General Appearance:    Alert, cooperative, no distress, appears stated age   Head:    Normocephalic, without obvious abnormality, atraumatic   Eyes:    PERRL, conjunctiva/corneas clear, EOM's intact, fundi     benign, both eyes   Ears:    Normal TM's and external ear canals, both ears   Nose:   Nares normal, septum midline, mucosa normal, no drainage    or sinus tenderness   Throat:   Lips, mucosa, and tongue normal; teeth and gums normal   Neck:   Supple, symmetrical, trachea midline, no adenopathy;     thyroid:  no enlargement/tenderness/nodules; no carotid    bruit or JVD   Back:     Symmetric, no curvature, ROM normal, no CVA tenderness   Lungs:     Clear to auscultation bilaterally, respirations unlabored   Chest Wall:    No tenderness or deformity    Heart:    " Regular rate and rhythm, S1 and S2 normal, no murmur, rub   or gallop   Abdomen:     Soft, non-tender, bowel sounds active all four quadrants,     no masses, no organomegaly   Extremities:   Extremities normal, atraumatic, no cyanosis or edema   Pulses:   2+ and symmetric all extremities   Skin:   Skin color, texture, turgor normal, no rashes or lesions   Lymph nodes:   Cervical, supraclavicular, and axillary nodes normal   Neurologic:   CNII-XII intact, normal strength, sensation and reflexes     throughout      Cardiographics  ECG 2013: sinus bradycardia     Imaging  Chest x-ray: NA     Lab Review   None necessary      Assessment:        62 y.o. female with planned surgery as above.    Known risk factors for perioperative complications: None       1. Preop examination      2. Cataract, unspecified cataract type, unspecified laterality      3. HTN (hypertension), benign      4. Hypothyroidism, unspecified type      5. History of intracranial hemorrhage            Current medications which may produce withdrawal symptoms if withheld perioperatively: bisoprolol      Plan:       1. Preoperative workup as follows none.  2. Change in medication regimen before surgery: discontinue ASA 14 days before surgery.  3. Prophylaxis for cardiac events with perioperative beta-blockers: not indicated.  4. Invasive hemodynamic monitoring perioperatively: not indicated.  5. Deep vein thrombosis prophylaxis postoperatively:NA.  6. Surveillance for postoperative MI with ECG immediately postoperatively and on postoperative days 1 and 2 AND troponin levels 24 hours postoperatively and on day 4 or hospital discharge (whichever comes first): not indicated.     The patient is cleared for surgery.          Justen Barragan MD

## 2018-09-20 RX ORDER — BISOPROLOL FUMARATE 10 MG/1
TABLET, FILM COATED ORAL
Qty: 30 TABLET | Refills: 0 | OUTPATIENT
Start: 2018-09-20

## 2018-09-21 ENCOUNTER — NURSE TRIAGE (OUTPATIENT)
Dept: ADMINISTRATIVE | Facility: CLINIC | Age: 63
End: 2018-09-21

## 2018-09-21 ENCOUNTER — PATIENT MESSAGE (OUTPATIENT)
Dept: ENDOCRINOLOGY | Facility: CLINIC | Age: 63
End: 2018-09-21

## 2018-09-21 RX ORDER — BISOPROLOL FUMARATE 10 MG/1
10 TABLET, FILM COATED ORAL DAILY
Qty: 30 TABLET | Refills: 0 | Status: CANCELLED | OUTPATIENT
Start: 2018-09-21

## 2018-09-21 NOTE — TELEPHONE ENCOUNTER
Reason for Disposition   Caller is angry or rude (e.g., hangs up, verbally abusive, yelling)   Caller hangs up    Answer Assessment - Initial Assessment Questions  1. SITUATION:  Document reason for call.      Patient immediately begins conversation by saying she Doesn't think I can help her, needs to get communication between her physician and herself, if I can't do that, she needs to just hang up.  When I tried to gather more information about what she needed, she said I couldn't help her and hung up.  2. BACKGROUND: Document any background information (e.g., prior calls, known psychiatric history)      I see an email from the patient earlier in the day in regard to a medication  3. ASSESSMENT: Document your nursing assessment.      Unable to assess, caller hung up  4. RESPONSE: Document what your response or recommendation was.      Unable, due to caller hanging up    Protocols used: ST NO CONTACT OR DUPLICATE CONTACT CALL-A-AH, ST DIFFICULT CALLER-A-

## 2018-09-21 NOTE — TELEPHONE ENCOUNTER
----- Message from Fernanda Bautista sent at 9/21/2018  2:27 PM CDT -----  Contact: Self [phone]  .Refill request.  bisoprolol (ZEBETA) 10 MG tablet     ..  [id number] - [place], [place] - [address] [place] [id number]  Phone: [phone] Fax: [phone]

## 2018-09-23 RX ORDER — BISOPROLOL FUMARATE 10 MG/1
10 TABLET, FILM COATED ORAL DAILY
Qty: 30 TABLET | Refills: 11 | Status: SHIPPED | OUTPATIENT
Start: 2018-09-23 | End: 2018-11-30

## 2018-10-15 DIAGNOSIS — Z12.11 COLON CANCER SCREENING: ICD-10-CM

## 2018-11-01 ENCOUNTER — DOCUMENTATION ONLY (OUTPATIENT)
Dept: REHABILITATION | Facility: HOSPITAL | Age: 63
End: 2018-11-01

## 2018-11-01 DIAGNOSIS — Z74.09 IMPAIRED FUNCTIONAL MOBILITY, BALANCE, AND ENDURANCE: ICD-10-CM

## 2018-11-01 DIAGNOSIS — F40.298 FEAR OF FALLING: ICD-10-CM

## 2018-11-02 ENCOUNTER — PATIENT MESSAGE (OUTPATIENT)
Dept: OTOLARYNGOLOGY | Facility: CLINIC | Age: 63
End: 2018-11-02

## 2018-11-05 ENCOUNTER — PATIENT MESSAGE (OUTPATIENT)
Dept: OTOLARYNGOLOGY | Facility: CLINIC | Age: 63
End: 2018-11-05

## 2018-11-05 ENCOUNTER — TELEPHONE (OUTPATIENT)
Dept: OTOLARYNGOLOGY | Facility: CLINIC | Age: 63
End: 2018-11-05

## 2018-11-05 DIAGNOSIS — Z86.79 HISTORY OF INTRACRANIAL HEMORRHAGE: ICD-10-CM

## 2018-11-05 DIAGNOSIS — R42 VERTIGO: Primary | ICD-10-CM

## 2018-11-05 DIAGNOSIS — Z87.81 HISTORY OF SKULL FRACTURE: ICD-10-CM

## 2018-11-07 ENCOUNTER — OFFICE VISIT (OUTPATIENT)
Dept: URGENT CARE | Facility: CLINIC | Age: 63
End: 2018-11-07
Payer: COMMERCIAL

## 2018-11-07 VITALS
BODY MASS INDEX: 22.86 KG/M2 | RESPIRATION RATE: 16 BRPM | TEMPERATURE: 98 F | WEIGHT: 129 LBS | DIASTOLIC BLOOD PRESSURE: 80 MMHG | OXYGEN SATURATION: 99 % | HEIGHT: 63 IN | HEART RATE: 52 BPM | SYSTOLIC BLOOD PRESSURE: 158 MMHG

## 2018-11-07 DIAGNOSIS — S05.12XD CONTUSION OF LEFT EYE, SUBSEQUENT ENCOUNTER: Primary | ICD-10-CM

## 2018-11-07 DIAGNOSIS — R22.9 SOFT TISSUE SWELLING: ICD-10-CM

## 2018-11-07 PROCEDURE — 3077F SYST BP >= 140 MM HG: CPT | Mod: CPTII,S$GLB,, | Performed by: EMERGENCY MEDICINE

## 2018-11-07 PROCEDURE — 1101F PT FALLS ASSESS-DOCD LE1/YR: CPT | Mod: CPTII,S$GLB,, | Performed by: EMERGENCY MEDICINE

## 2018-11-07 PROCEDURE — 3079F DIAST BP 80-89 MM HG: CPT | Mod: CPTII,S$GLB,, | Performed by: EMERGENCY MEDICINE

## 2018-11-07 PROCEDURE — 3008F BODY MASS INDEX DOCD: CPT | Mod: CPTII,S$GLB,, | Performed by: EMERGENCY MEDICINE

## 2018-11-07 PROCEDURE — 99214 OFFICE O/P EST MOD 30 MIN: CPT | Mod: S$GLB,,, | Performed by: EMERGENCY MEDICINE

## 2018-11-07 NOTE — PATIENT INSTRUCTIONS
SMALL ASPIRATION DONE IN CLINIC AND SOME BLOOD RETURNED, OLD VS NEW UNSURE.  KEEP CLEAN AND ALLOW TO REABSORB OVER TIME, CAN TAKE SEVERAL WEEKS TO GO AWAY 100%  SEE CONTUSION EYE SHEET  GO TO THE ER FOR LOSS OF VISION, SEVERE HEADACHES, WEAKNESS IN THE ARMS OR LEGS, OR ANY PERSISTENT NAUSEA AND VOMITING.    SEE CONTUSION EYE SHEET  FOLLOW UP WITH YOUR PCP    Eye Contusion  A contusion is another word for a bruise. It happens when small blood vessels break open and leak blood into the nearby area. An eye contusion is usually caused by something hitting the eye or nose. You may have pain and swelling around the eye. The skin may also change color (it may be red at first and then darken). For this reason, an eye contusion is often called a black eye.  If needed, imaging tests, such as an X-ray, may be done to help rule out more serious problems.  Pain and swelling should improve within a few days. Bruising may take longer to go away.  Home care  · If you have been prescribed medicines for pain, take them as directed.  · To help reduce swelling and pain for the first day or two, apply a cold pack to the injured eye for up to 20 minutes. Do this as often as directed. You can make an ice pack by filling a plastic bag that seals at the top with ice cubes, and then wrapping it with a thin towel. Never put a cold pack directly on the skin.  Note about concussion  Because the injury was to your face or head, it is possible that a mild brain injury called a concussion could result. You dont have symptoms of a concussion at this time. But they can show up later. For this reason, you may be told to watch for symptoms of concussion once youre home.   Call 911 if you have any of the symptoms below over the next hours to days:  · Headache  · Nausea or vomiting  · Dizziness  · Sensitivity to light or noise  · Unusual sleepiness or grogginess  · Trouble falling asleep  · Personality changes  · Vision changes  · Memory  loss  · Confusion  · Trouble walking or clumsiness  · Loss of consciousness (even for a short time)  · Inability to be awakened   Follow-up care  Follow up with your healthcare provider, or as directed. If imaging tests were done, they may need to be reviewed by a healthcare provider. Youll be told the results and any new findings that may affect your treatment.  When to seek medical advice  Call your healthcare provider right away if any of these occur:   · Pain, bruising, or swelling worsens  · Vision changes, such as seeing small dots or double vision  · Inability to move the eye  · Bleeding on the eyeball surface  Date Last Reviewed: 2/1/2017 © 2000-2017 [employer]. [address] [place] [address], [place] [address]. All rights reserved. This information is not intended as a substitute for professional medical care. Always follow your healthcare professional's instructions.        Soft Tissue Contusion  You have a contusion. This is also called a bruise. There is swelling and some bleeding under the skin. This injury generally takes a few days to a few weeks to heal.  During that time, the bruise will typically change in color from reddish, to purple-blue, to greenish-yellow, then to yellow-brown.  Home care  · Elevate the injured area to reduce pain and swelling. As much as possible, sit or lie down with the injured area raised about the level of your heart. This is especially important during the first 48 hours.  · Ice the injured area to help reduce pain and swelling. Wrap a cold source (ice pack or ice cubes in a plastic bag) in a thin towel. Apply to the bruised area for 20 minutes every 1 to 2 hours the first day. Continue this 3 to 4 times a day until the pain and swelling goes away.  · Unless another medication was prescribed, you can take acetaminophen, ibuprofen, or naproxen to control pain. (If you have chronic liver or kidney disease or ever had a stomach ulcer or GI bleeding, talk with your  doctor before using these medicines.)  Follow up  Follow up with your health care provider or our staff as advised. Call if you are not better in 1 to 2 weeks.  When to seek medical advice   Call your health care provider right away if you have any of the following:  · Increased pain or swelling  · Bruise is on an arm or leg and arm or leg becomes cold, blue, numb or tingly  · Signs of infection: Warmth, drainage, or increased redness or pain around the contusion  · Inability to move the injured area or body part   · Bruise is near your eye and you have problems with your eyesight or eye   · Frequent bruising for unknown reasons  Date Last Reviewed: 4/29/2015  © 5409-9914 [employer]. [address] [place] [address], [place] [address]. All rights reserved. This information is not intended as a substitute for professional medical care. Always follow your healthcare professional's instructions.

## 2018-11-07 NOTE — PROGRESS NOTES
"Subjective:       Patient ID: Shira Vega is a 63 y.o. female.    Vitals:    11/07/18 1107   BP: (!) 158/80   Pulse: (!) 52   Resp: 16   Temp: 97.6 °F (36.4 °C)   TempSrc: Oral   SpO2: 99%   Weight: 58.5 kg (129 lb)   Height: 5' 3" (1.6 m)       Chief Complaint: Head Injury (left side of fore head)    Patient reports that she had a fall 10/12/2018 and she hit her forehead .Patient reports that the bruise on her forehead has blood in it and it is moving down to her left eye and it gave her a black eye.Patient still feels a lump on her forehead and is worried about it.Patient reports no vision  problems in left eye.She denies loc, residual headaches or red eyes.she states it is not painful and certainly has not had concerning symptoms for intracranial bleed or skull fracture, and it has been over 5 weeks since the fall.      Head Injury    Incident onset: 27 days ago. The injury mechanism was a fall. There was no loss of consciousness. The patient is experiencing no pain. Pertinent negatives include no blurred vision, headaches or vomiting. She has tried ice (heat) for the symptoms.     Review of Systems   Constitution: Negative for chills and fever.   HENT: Negative for congestion.    Eyes: Negative for blurred vision, pain, photophobia and redness.   Gastrointestinal: Negative for nausea and vomiting.   Neurological: Negative for headaches.       Objective:      Physical Exam   Constitutional: She is oriented to person, place, and time. She appears well-developed and well-nourished. She is cooperative.  Non-toxic appearance. She does not appear ill. No distress.   HENT:   Head: Normocephalic and atraumatic. Head is without abrasion, without contusion and without laceration.   Right Ear: Hearing, tympanic membrane, external ear and ear canal normal. No hemotympanum.   Left Ear: Hearing, tympanic membrane, external ear and ear canal normal. No hemotympanum.   Nose: Nose normal. No mucosal edema, rhinorrhea or " nasal deformity. No epistaxis. Right sinus exhibits no maxillary sinus tenderness and no frontal sinus tenderness. Left sinus exhibits no maxillary sinus tenderness and no frontal sinus tenderness.   Mouth/Throat: Uvula is midline, oropharynx is clear and moist and mucous membranes are normal. No trismus in the jaw. Normal dentition. No uvula swelling. No posterior oropharyngeal erythema.   1 cm horizontal bruise infraorbital area fading   Left supraorbital subtle area of soft soft tissue swelling, barely noticeable but causing quite a bit of anxiety to patient. After area numbed with lidocaine and 1 puncture used for aspitation of blood and exploring to see if any sort of pus or old retained blood. Expressed scant amount of blood, likely mixture of old and new   Eyes: Conjunctivae, EOM and lids are normal. Pupils are equal, round, and reactive to light. Right eye exhibits no discharge. Left eye exhibits no discharge. No scleral icterus.   Sclera clear bilat   Neck: Trachea normal, normal range of motion, full passive range of motion without pain and phonation normal. Neck supple. No spinous process tenderness and no muscular tenderness present. No neck rigidity. No tracheal deviation present.   Cardiovascular: Normal rate, regular rhythm, normal heart sounds, intact distal pulses and normal pulses.   Pulmonary/Chest: Effort normal and breath sounds normal. No respiratory distress.   Abdominal: Soft. Normal appearance and bowel sounds are normal. She exhibits no distension, no pulsatile midline mass and no mass. There is no tenderness.   Musculoskeletal: Normal range of motion. She exhibits no edema or deformity.   Neurological: She is alert and oriented to person, place, and time. She has normal strength. No cranial nerve deficit or sensory deficit. She exhibits normal muscle tone. She displays no seizure activity. Coordination normal. GCS eye subscore is 4. GCS verbal subscore is 5. GCS motor subscore is 6.    Skin: Skin is warm, dry and intact. Capillary refill takes less than 2 seconds. No abrasion, no bruising, no burn, no ecchymosis and no laceration noted. She is not diaphoretic. No pallor.   Psychiatric: She has a normal mood and affect. Her speech is normal. Cognition and memory are normal.   Nursing note and vitals reviewed.      Assessment:       1. Contusion of left eye, subsequent encounter    2. Soft tissue swelling        Plan:       Shira was seen today for head injury.    Diagnoses and all orders for this visit:    Contusion of left eye, subsequent encounter  Comments:  4 weeks old    Soft tissue swelling          Patient Instructions     SMALL ASPIRATION DONE IN CLINIC AND SOME BLOOD RETURNED, OLD VS NEW UNSURE.  KEEP CLEAN AND ALLOW TO REABSORB OVER TIME, CAN TAKE SEVERAL WEEKS TO GO AWAY 100%  SEE CONTUSION EYE SHEET  GO TO THE ER FOR LOSS OF VISION, SEVERE HEADACHES, WEAKNESS IN THE ARMS OR LEGS, OR ANY PERSISTENT NAUSEA AND VOMITING.    SEE CONTUSION EYE SHEET  FOLLOW UP WITH YOUR PCP    Eye Contusion  A contusion is another word for a bruise. It happens when small blood vessels break open and leak blood into the nearby area. An eye contusion is usually caused by something hitting the eye or nose. You may have pain and swelling around the eye. The skin may also change color (it may be red at first and then darken). For this reason, an eye contusion is often called a black eye.  If needed, imaging tests, such as an X-ray, may be done to help rule out more serious problems.  Pain and swelling should improve within a few days. Bruising may take longer to go away.  Home care  · If you have been prescribed medicines for pain, take them as directed.  · To help reduce swelling and pain for the first day or two, apply a cold pack to the injured eye for up to 20 minutes. Do this as often as directed. You can make an ice pack by filling a plastic bag that seals at the top with ice cubes, and then wrapping it  with a thin towel. Never put a cold pack directly on the skin.  Note about concussion  Because the injury was to your face or head, it is possible that a mild brain injury called a concussion could result. You dont have symptoms of a concussion at this time. But they can show up later. For this reason, you may be told to watch for symptoms of concussion once youre home.   Call 911 if you have any of the symptoms below over the next hours to days:  · Headache  · Nausea or vomiting  · Dizziness  · Sensitivity to light or noise  · Unusual sleepiness or grogginess  · Trouble falling asleep  · Personality changes  · Vision changes  · Memory loss  · Confusion  · Trouble walking or clumsiness  · Loss of consciousness (even for a short time)  · Inability to be awakened   Follow-up care  Follow up with your healthcare provider, or as directed. If imaging tests were done, they may need to be reviewed by a healthcare provider. Youll be told the results and any new findings that may affect your treatment.  When to seek medical advice  Call your healthcare provider right away if any of these occur:   · Pain, bruising, or swelling worsens  · Vision changes, such as seeing small dots or double vision  · Inability to move the eye  · Bleeding on the eyeball surface  Date Last Reviewed: 2/1/2017  © 3006-6973 [employer]. [address] [place] [address], [place] [address]. All rights reserved. This information is not intended as a substitute for professional medical care. Always follow your healthcare professional's instructions.        Soft Tissue Contusion  You have a contusion. This is also called a bruise. There is swelling and some bleeding under the skin. This injury generally takes a few days to a few weeks to heal.  During that time, the bruise will typically change in color from reddish, to purple-blue, to greenish-yellow, then to yellow-brown.  Home care  · Elevate the injured area to reduce pain and swelling. As  much as possible, sit or lie down with the injured area raised about the level of your heart. This is especially important during the first 48 hours.  · Ice the injured area to help reduce pain and swelling. Wrap a cold source (ice pack or ice cubes in a plastic bag) in a thin towel. Apply to the bruised area for 20 minutes every 1 to 2 hours the first day. Continue this 3 to 4 times a day until the pain and swelling goes away.  · Unless another medication was prescribed, you can take acetaminophen, ibuprofen, or naproxen to control pain. (If you have chronic liver or kidney disease or ever had a stomach ulcer or GI bleeding, talk with your doctor before using these medicines.)  Follow up  Follow up with your health care provider or our staff as advised. Call if you are not better in 1 to 2 weeks.  When to seek medical advice   Call your health care provider right away if you have any of the following:  · Increased pain or swelling  · Bruise is on an arm or leg and arm or leg becomes cold, blue, numb or tingly  · Signs of infection: Warmth, drainage, or increased redness or pain around the contusion  · Inability to move the injured area or body part   · Bruise is near your eye and you have problems with your eyesight or eye   · Frequent bruising for unknown reasons  Date Last Reviewed: 4/29/2015  © 2042-7537 [employer]. [address] [place] [address], [place] [address]. All rights reserved. This information is not intended as a substitute for professional medical care. Always follow your healthcare professional's instructions.

## 2018-11-20 ENCOUNTER — CLINICAL SUPPORT (OUTPATIENT)
Dept: REHABILITATION | Facility: HOSPITAL | Age: 63
End: 2018-11-20
Attending: SPECIALIST
Payer: COMMERCIAL

## 2018-11-20 DIAGNOSIS — Z74.09 IMPAIRED FUNCTIONAL MOBILITY, BALANCE, AND ENDURANCE: ICD-10-CM

## 2018-11-20 DIAGNOSIS — F40.298 FEAR OF FALLING: ICD-10-CM

## 2018-11-20 PROCEDURE — 97162 PT EVAL MOD COMPLEX 30 MIN: CPT | Mod: PO

## 2018-11-20 PROCEDURE — 97164 PT RE-EVAL EST PLAN CARE: CPT | Mod: PO
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
## 2018-11-28 ENCOUNTER — CLINICAL SUPPORT (OUTPATIENT)
Dept: REHABILITATION | Facility: HOSPITAL | Age: 63
End: 2018-11-28
Attending: SPECIALIST
Payer: COMMERCIAL

## 2018-11-28 DIAGNOSIS — F40.298 FEAR OF FALLING: ICD-10-CM

## 2018-11-28 DIAGNOSIS — Z74.09 IMPAIRED FUNCTIONAL MOBILITY, BALANCE, AND ENDURANCE: ICD-10-CM

## 2018-11-28 PROCEDURE — 97112 NEUROMUSCULAR REEDUCATION: CPT | Mod: PO

## 2018-11-28 NOTE — PROGRESS NOTES
"                                                    Physical Therapy Progress Note     Name: Shira Vega  Clinic Number: [id number]  Diagnosis:   Encounter Diagnoses   Name Primary?    Impaired functional mobility, balance, and endurance     Fear of falling      Physician: ДМИТРИЙ Macdonald MD  Treatment Orders: PT Eval and Treat  Past Medical History:   Diagnosis Date    Basilar skull fracture 1/27/2015    Bilateral fracture of pubic rami 1/27/2015    Cataract     Fracture of ribs, multiple, closed 1/27/2015    HTN (hypertension), benign     Hyperlipidemia LDL goal <160     Hypothyroidism     MVP (mitral valve prolapse)        Precautions: fall, visual deficits, standard.  Visit #: 2  Date of Eval: 11/20/18  Plan of Care Expiration: 1/15/19    G codes 2/10    FSW  SSWS On ST caseload?no         Subjective   Pt reports: fear of fall  Pain Scale:  denied    Objective     Patient received  Shira received therapeutic exercises to develop strength, endurance, ROM, flexibility, posture and core stabilization for 5  minutes including: therapy with activities as follows:     Neurological reeducation 40 minutes    //Bars:   -Heel raises 2 x 10 no UE sup  -Toe raises 2 x 10 no UE sup  -Step over 1/2 foam bolster 2 x 10 no UE sup  -Alternate foot placement to 6" step (initially needing 1 UE sup but progressed no no UE sup)   -SLS static w/contralateral foot on 6" step (intermittent UE sup but progress to no UE sup)  -Step to 6" step 2 x 10 (initially needed 1 UE sup progressing to no UE support)     On foam:  -Romberg head turns horizontally x 10 EO  -Romberg head turns vertically x 10 EO  -Romberg static EC    Ambulation: 150' with horizontal head turns min veering Min A/CGA   Ambulation: 150' with vertical head turns min veering Min A/CGA    NEXT: ankle motor control and balance at counter HEP        Written Home Exercises:     Pt demo good understanding of the education provided. Shira demonstrated good return " demonstration of activities.     Education provided re: POC, HEP  No spiritual or educational barriers to learning provided    Pt has no cultural, educational or language barriers to learning provided.    Assessment   Shira tolerated interventions well. Very tentative about eliminating UE support. However progressed well this visit as compared to initial trials.    GOALS:   Short term goals: 4 weeks, pt agrees to goals set.  1. Pt to be (I) with established HEP  2. Pt to improve L ankle IV and EV strength scores to at least 4+/5 for improved ankle strategy with balance.   3. Pt to improve BLE SLS scores to at least 3 sec consistently for improved safety with standing ADL  4. Pt to improve GST condition 2 to at least 30 seconds for improved balance and decreased fall risk  5. Pt to improve GST condition 3 to at least 15 seconds for improved balance and decreased fall risk  6. Pt to improve GST condition 4 to at least 8 seconds for improved balance and decreased fall risk  7. Pt to improve GST condition 6 to at least 10 seconds for improved balance and decreased fall risk  8. Pt to improve FGA score to at least 23/30 for improved balance and decreased fall risk     Long term goals: 8 weeks, pt agrees to goals set  9. Pt to improve BLE SLS scores to at least 6 sec consistently for improved safety with standing ADL  10. Pt to improve GST condition 3 to at least 20 seconds for improved balance and decreased fall risk  11. Pt to improve GST condition 4 to at least 12 seconds for improved balance and decreased fall risk  12. Pt to improve GST condition 6 to at least 20 seconds for improved balance and decreased fall risk  13. Pt to improve FGA score to at least 26/30 for improved balance and decreased fall risk       Plan   Continue PT 2 x weekly under established Plan of Care, with treatment to include: pt education, HEP, therapeutic exercises, neuromuscular re-education/balance exercises, therapeutic activities, joint  mobilizations, and modalities PRN, to work towards established goals. Pt may be seen by PTA to carry out plan of care.     Logan Smith, PTA   11/28/2018

## 2018-11-30 ENCOUNTER — OFFICE VISIT (OUTPATIENT)
Dept: OTOLARYNGOLOGY | Facility: CLINIC | Age: 63
End: 2018-11-30
Payer: COMMERCIAL

## 2018-11-30 VITALS
WEIGHT: 127.5 LBS | BODY MASS INDEX: 22.59 KG/M2 | SYSTOLIC BLOOD PRESSURE: 117 MMHG | HEART RATE: 54 BPM | HEIGHT: 63 IN | TEMPERATURE: 98 F | DIASTOLIC BLOOD PRESSURE: 77 MMHG

## 2018-11-30 DIAGNOSIS — K21.9 LARYNGOPHARYNGEAL REFLUX (LPR): ICD-10-CM

## 2018-11-30 DIAGNOSIS — Z74.09 IMPAIRED FUNCTIONAL MOBILITY, BALANCE, AND ENDURANCE: ICD-10-CM

## 2018-11-30 DIAGNOSIS — R13.10 DYSPHAGIA, UNSPECIFIED TYPE: Primary | ICD-10-CM

## 2018-11-30 DIAGNOSIS — J34.2 NASAL SEPTAL DEVIATION: ICD-10-CM

## 2018-11-30 DIAGNOSIS — S00.12XD CONTUSION OF LEFT PERIOCULAR REGION, SUBSEQUENT ENCOUNTER: ICD-10-CM

## 2018-11-30 PROCEDURE — 1100F PTFALLS ASSESS-DOCD GE2>/YR: CPT | Mod: CPTII,S$GLB,, | Performed by: SPECIALIST

## 2018-11-30 PROCEDURE — 3008F BODY MASS INDEX DOCD: CPT | Mod: CPTII,S$GLB,, | Performed by: SPECIALIST

## 2018-11-30 PROCEDURE — 3288F FALL RISK ASSESSMENT DOCD: CPT | Mod: CPTII,S$GLB,, | Performed by: SPECIALIST

## 2018-11-30 PROCEDURE — 3078F DIAST BP <80 MM HG: CPT | Mod: CPTII,S$GLB,, | Performed by: SPECIALIST

## 2018-11-30 PROCEDURE — 3074F SYST BP LT 130 MM HG: CPT | Mod: CPTII,S$GLB,, | Performed by: SPECIALIST

## 2018-11-30 PROCEDURE — 31575 DIAGNOSTIC LARYNGOSCOPY: CPT | Mod: S$GLB,,, | Performed by: SPECIALIST

## 2018-11-30 PROCEDURE — 99214 OFFICE O/P EST MOD 30 MIN: CPT | Mod: 25,S$GLB,, | Performed by: SPECIALIST

## 2018-11-30 RX ORDER — BISOPROLOL FUMARATE 10 MG/1
10 TABLET, FILM COATED ORAL DAILY
COMMUNITY
End: 2019-10-08 | Stop reason: SDUPTHER

## 2018-11-30 NOTE — PROCEDURES
"Laryngoscopy  Date/Time: 11/30/2018 11:02 AM  Performed by: ДМИТРИЙ Macdonald MD  Authorized by: ДМИТРИЙ Macdonald MD     Time out: Immediately prior to procedure a "time out" was called to verify the correct patient, procedure, equipment, support staff and site/side marked as required.    Consent Done?:  Yes (Verbal)  Anesthesia:     Local anesthetic:  Lidocaine 2% and Juan-Synephrine 1/2% (Topical aerosol)    Patient tolerance:  Patient tolerated the procedure well with no immediate complications    Decongestion performed?: Yes    Laryngoscopy:     Areas examined:  Nasal cavities, nasopharynx, oropharynx, hypopharynx, larynx and vocal cords    Laryngoscope size:  4 mm (Flexible video nasolaryngoscopy)  Nose External:      No external nasal deformity  Nose Intranasal:      Mucosa no polyps     Mucosa ulcers not present     No mucosa lesions present (Clear mucus in the nasal passages bilaterally)     Septum gross deformity ( septum deviated to the left)     Enlarged turbinates ( inferior turbinates mildly enlarged bilaterally)  Nasopharynx:      No mucosa lesions     Adenoids not present     Posterior choanae patent     Eustachian tube patent  Larynx/hypopharynx:      No epiglottis lesions     No epiglottis edema     No AE folds lesions     No vocal cord polyps     Equal and normal bilateral ( vocal cords move symmetrically, no mucosal lesions noted)     No hypopharynx lesions     No piriform sinus pooling     No piriform sinus lesions     Post cricoid edema ( mild to moderate, significantly improved when compared to endoscopy of August 2018)     Post cricoid erythema ( mild to moderate, significantly improved when compared to endoscopy of August 2018)     Flexible video nasolaryngoscopy reveals nasal septal deviation and changes of nasal allergies, laryngeal changes consistent with laryngopharyngeal reflux that is improving on b.i.d. H2 agonist therapy        "

## 2018-12-01 NOTE — PROGRESS NOTES
Subjective:       Patient ID: Shira Vega is a 63 y.o. female.    Chief Complaint: Follow-up (with check Left Eye)    The patient is returning for follow-up visit.  There are multiple issues to discuss:    1.  She has had bilateral cataract surgery, the right eye in July in the left eye and August.  She reports that her distance vision is excellent but that she has to use reading glasses now.  2.  She [place].  She struck her face in the forehead/eyebrow area on the left.  Subsequently she had a black eye.  She did not go to the emergency room after the incident.  A few days later she went to [place] [facility] [place] where the hematoma above her left eyebrow was evacuated with a needle and aspiration.  3.  She feels that her balance has worsened since having the fall.  She, upon my orders, has restarted with vestibular rehabilitation recently.  She will continue twice weekly for 6 weeks to months of November and December.  Her primary problem deals with walking backwards.  Four.  With regard to her laryngopharyngeal reflux she feels that her throat clearing has improved significantly as has her dysphagia.  She continues to has some raspiness in her voice.      Review of Systems   Constitutional: Positive for fatigue. Negative for activity change, appetite change, chills, fever and unexpected weight change.   HENT: Positive for congestion, postnasal drip, rhinorrhea, sinus pressure, sinus pain and trouble swallowing. Negative for ear discharge, ear pain, facial swelling, hearing loss, mouth sores, sneezing, sore throat, tinnitus and voice change.    Eyes: Positive for pain (Above the left eyebrow). Negative for photophobia, discharge, redness, itching and visual disturbance.   Respiratory: Positive for cough. Negative for apnea, choking, shortness of breath and wheezing.    Cardiovascular: Negative for chest pain and palpitations.   Gastrointestinal: Negative  for abdominal distention, abdominal pain, nausea and vomiting.   Musculoskeletal: Negative for arthralgias, myalgias, neck pain and neck stiffness.   Skin: Negative.  Negative for color change, pallor and rash.   Allergic/Immunologic: Positive for environmental allergies. Negative for food allergies and immunocompromised state.   Neurological: Positive for dizziness, light-headedness and headaches. Negative for facial asymmetry, speech difficulty, weakness and numbness.   Hematological: Negative for adenopathy. Does not bruise/bleed easily.   Psychiatric/Behavioral: Negative for confusion, decreased concentration and sleep disturbance.       Objective:      Physical Exam   Constitutional: She is oriented to person, place, and time. She appears well-developed and well-nourished. She is cooperative.   HENT:   Head: Normocephalic.   Right Ear: External ear and ear canal normal. Tympanic membrane is retracted.   Left Ear: External ear and ear canal normal. Tympanic membrane is retracted.   Nose: Mucosal edema (cyanotic, boggy inferior turbinates bilaterally), rhinorrhea (clear mucus bilaterally) and septal deviation (To the left) present.   Mouth/Throat: Uvula is midline, oropharynx is clear and moist and mucous membranes are normal. No oral lesions.   Eyes: EOM and lids are normal. Pupils are equal, round, and reactive to light. Right eye exhibits no discharge and no exudate. Left eye exhibits no discharge and no exudate. Right conjunctiva is injected. Left conjunctiva is injected.       Left eye-soft tissue swelling with central depression above the medial and lateral 3rd of the left eyebrow   Neck: Trachea normal and normal range of motion. No muscular tenderness present. No tracheal deviation present. No thyroid mass and no thyromegaly present.   Cardiovascular: Normal rate, regular rhythm, normal heart sounds and normal pulses.   Pulmonary/Chest: Effort normal and breath sounds normal. No stridor. She has no  decreased breath sounds. She has no wheezes. She has no rhonchi. She has no rales.   Abdominal: Soft. Bowel sounds are normal. There is no tenderness.   Musculoskeletal: Normal range of motion.   Lymphadenopathy:        Head (right side): No submental, no submandibular, no preauricular, no posterior auricular and no occipital adenopathy present.        Head (left side): No submental, no submandibular, no preauricular, no posterior auricular and no occipital adenopathy present.     She has no cervical adenopathy.   Neurological: She is alert and oriented to person, place, and time. She has normal strength. No cranial nerve deficit or sensory deficit. Gait normal.   Skin: Skin is warm and dry. No petechiae and no rash noted. No cyanosis. Nails show no clubbing.   Psychiatric: She has a normal mood and affect. Her speech is normal and behavior is normal. Judgment and thought content normal. Cognition and memory are normal.       Flexible nasolaryngoscopy-septum deviated, nasal changes consistent with allergic rhinitis, laryngopharyngeal reflux on H2 agonist therapy    Assessment:       1. Dysphagia, unspecified type    2. Laryngopharyngeal reflux (LPR)    3. Impaired functional mobility, balance, and endurance    4. Nasal septal deviation    5. Contusion of left periocular region, subsequent encounter        Plan:       I will have the patient continue with her vestibular rehabilitative therapy.  I will recheck her in 2 months, or sooner on an as-needed basis.

## 2018-12-03 ENCOUNTER — DOCUMENTATION ONLY (OUTPATIENT)
Dept: REHABILITATION | Facility: HOSPITAL | Age: 63
End: 2018-12-03

## 2018-12-03 ENCOUNTER — CLINICAL SUPPORT (OUTPATIENT)
Dept: REHABILITATION | Facility: HOSPITAL | Age: 63
End: 2018-12-03
Attending: SPECIALIST
Payer: COMMERCIAL

## 2018-12-03 ENCOUNTER — PATIENT MESSAGE (OUTPATIENT)
Dept: OTOLARYNGOLOGY | Facility: CLINIC | Age: 63
End: 2018-12-03

## 2018-12-03 DIAGNOSIS — Z74.09 IMPAIRED FUNCTIONAL MOBILITY, BALANCE, AND ENDURANCE: ICD-10-CM

## 2018-12-03 DIAGNOSIS — F40.298 FEAR OF FALLING: ICD-10-CM

## 2018-12-03 PROCEDURE — 97112 NEUROMUSCULAR REEDUCATION: CPT | Mod: PO

## 2018-12-03 NOTE — TELEPHONE ENCOUNTER
----- Message from Bianca Owens sent at 12/3/2018  1:46 PM CST -----  Contact: Pt  Name of Who is Calling:BALWINDER URBANO [[id number]]    What is the request in detail: patient would like a call back to discuss medication refill request that has not been sent to pharmacy ranitidine (ZANTAC) 150 MG tablet Please contact to further discuss and advise    Can the clinic reply by [facility]: Yes    What Number to Call Back if not in [facility]: [phone]

## 2018-12-03 NOTE — PROGRESS NOTES
"                                                    Physical Therapy Progress Note     Name: Shira Vega  Clinic Number: [id number]  Diagnosis:   Encounter Diagnoses   Name Primary?    Impaired functional mobility, balance, and endurance     Fear of falling      Physician: ДМИТРИЙ Macdonald MD  Treatment Orders: PT Eval and Treat  Past Medical History:   Diagnosis Date    Basilar skull fracture 1/27/2015    Bilateral fracture of pubic rami 1/27/2015    Cataract     Fracture of ribs, multiple, closed 1/27/2015    HTN (hypertension), benign     Hyperlipidemia LDL goal <160     Hypothyroidism     MVP (mitral valve prolapse)        Precautions: fall, visual deficits, standard.  Visit #: 3  Date of Eval: 11/20/18  Plan of Care Expiration: 1/15/19    G codes 3/10    FSW  SSWS On ST caseload?no         Subjective   Pt reports: fear of fall  Pain Scale:  denied    Objective     Patient received  Shira received therapeutic exercises to develop strength, endurance, ROM, flexibility, posture and core stabilization for 5  minutes including: therapy with activities as follows:     Neurological reeducation 40 minutes    Nustep active warmup 5 minutes    //Bars:   -Heel raises 2 x 10 no UE sup  -Toe raises 2 x 10 no UE sup  -Step over 1/2 foam bolster 2 x 10 no UE sup NP  -Alternate foot placement to 6" step   -SLS static w/contralateral foot on 6" step  -Step to 6" step 2 x 10 (initially needed 1 UE sup progressing to no UE support)     On foam:  -Romberg head turns horizontally x 10 EO  -Romberg head turns vertically x 10 EO  -Romberg static EC    Ambulation: 150' with horizontal head turns min veering Min A/CGA   Ambulation: 150' with vertical head turns min veering Min A/CGA    NEXT: ankle motor control and balance at counter HEP        Written Home Exercises:     Pt demo good understanding of the education provided. Shira demonstrated good return demonstration of activities.     Education provided re: POC, HEP  No " spiritual or educational barriers to learning provided    Pt has no cultural, educational or language barriers to learning provided.    Assessment   Shira tolerated interventions well. Very tentative about eliminating UE support but less than last visit. Able to perform several activities w/o hand support. However progressed well this visit as compared to initial trials.    GOALS:   Short term goals: 4 weeks, pt agrees to goals set.  1. Pt to be (I) with established HEP  2. Pt to improve L ankle IV and EV strength scores to at least 4+/5 for improved ankle strategy with balance.   3. Pt to improve BLE SLS scores to at least 3 sec consistently for improved safety with standing ADL  4. Pt to improve GST condition 2 to at least 30 seconds for improved balance and decreased fall risk  5. Pt to improve GST condition 3 to at least 15 seconds for improved balance and decreased fall risk  6. Pt to improve GST condition 4 to at least 8 seconds for improved balance and decreased fall risk  7. Pt to improve GST condition 6 to at least 10 seconds for improved balance and decreased fall risk  8. Pt to improve FGA score to at least 23/30 for improved balance and decreased fall risk     Long term goals: 8 weeks, pt agrees to goals set  9. Pt to improve BLE SLS scores to at least 6 sec consistently for improved safety with standing ADL  10. Pt to improve GST condition 3 to at least 20 seconds for improved balance and decreased fall risk  11. Pt to improve GST condition 4 to at least 12 seconds for improved balance and decreased fall risk  12. Pt to improve GST condition 6 to at least 20 seconds for improved balance and decreased fall risk  13. Pt to improve FGA score to at least 26/30 for improved balance and decreased fall risk       Plan   Continue PT 2 x weekly under established Plan of Care, with treatment to include: pt education, HEP, therapeutic exercises, neuromuscular re-education/balance exercises, therapeutic activities,  joint mobilizations, and modalities PRN, to work towards established goals. Pt may be seen by PTA to carry out plan of care.     Logan Smith, PTA   12/03/2018

## 2018-12-05 ENCOUNTER — DOCUMENTATION ONLY (OUTPATIENT)
Dept: REHABILITATION | Facility: HOSPITAL | Age: 63
End: 2018-12-05

## 2018-12-05 NOTE — PROGRESS NOTES
Face to face meeting completed with Viridiana Matthew PT regarding current status and progress of   Shira Vega .  Logan Smith, PTA

## 2018-12-06 ENCOUNTER — CLINICAL SUPPORT (OUTPATIENT)
Dept: REHABILITATION | Facility: HOSPITAL | Age: 63
End: 2018-12-06
Attending: SPECIALIST
Payer: COMMERCIAL

## 2018-12-06 DIAGNOSIS — Z74.09 IMPAIRED FUNCTIONAL MOBILITY, BALANCE, AND ENDURANCE: ICD-10-CM

## 2018-12-06 DIAGNOSIS — F40.298 FEAR OF FALLING: ICD-10-CM

## 2018-12-06 PROCEDURE — 97112 NEUROMUSCULAR REEDUCATION: CPT | Mod: PO

## 2018-12-06 PROCEDURE — 97110 THERAPEUTIC EXERCISES: CPT | Mod: PO

## 2018-12-06 NOTE — PROGRESS NOTES
"                                                    Physical Therapy Progress Note     Name: Shira Vega  Clinic Number: [id number]  Diagnosis:   Encounter Diagnoses   Name Primary?    Impaired functional mobility, balance, and endurance     Fear of falling      Physician: ДМИТРИЙ Macdonald MD  Treatment Orders: PT Eval and Treat  Past Medical History:   Diagnosis Date    Basilar skull fracture 1/27/2015    Bilateral fracture of pubic rami 1/27/2015    Cataract     Fracture of ribs, multiple, closed 1/27/2015    HTN (hypertension), benign     Hyperlipidemia LDL goal <160     Hypothyroidism     MVP (mitral valve prolapse)        Precautions: fall, visual deficits, standard.  Visit #: 4  Date of Eval: 11/20/18  Plan of Care Expiration: 1/15/19    G codes 4/10    FSW  SSWS On ST caseload?no         Subjective   Pt reports: that she is doing well today. She is going to [place] this weekend.   Pain Scale:  denied    Objective     Patient received  Shira received therapeutic exercises to develop strength, endurance, ROM, flexibility, posture and core stabilization for 15 minutes including: therapy with activities as follows:     X 10 min upright stationary bike L6 for CV endurance and B ankle ROM    3 x 30" BLE GS stretch on incline with BUE support    Ascend 4 steps x 2 trials, NR pattern, no UE support, CGA  Descend 4 steps x 2 trials, NR pattern, pt holding onto PT's hands, Mod A 1st trial, Min A to Mod A 2nd trial    Patient participated in neuromuscular re-education activities to improve: Balance, Coordination, Sense and Proprioception for 30 minutes. The following activities were included:     X 4 laps alternating vaulting in // bars, BUE support, CGA to SBA    2 x 30" each LE in front, tandem stance, occ touchdown support, CGA    Foam pad:   X 30" static stance with EO, no UE support   3 x 30" static stance with EC, occ touchdown support, Min A    2 X 10 each LE, SLS with alternate LE tapping 2 medium cones " "placed in front; 1 finger touchdown support; CGA    2 x 30" each LE, SLS with alternate LE partially on 6" step,  No UE support, CGA    Written Home Exercises:     Pt demo good understanding of the education provided. Shira demonstrated good return demonstration of activities.     Education provided re: POC, HEP  No spiritual or educational barriers to learning provided    Pt has no cultural, educational or language barriers to learning provided.    Assessment   Shira tolerated therapy session well this afternoon with no complaints. Pt remains tentative to remove her hands from // bars during SLS challenges. Therefore, cone tapping activity modified to allow for 1 finger touchdown support so that patient could feel more confident performing balance activity. Pt also required VCs for improved control, coordination, and motor control with multi-step activities; once steps were broken down, patient was able to perform task correctly. During stair ascent, patient able to ascend 4 steps without UE support, but in NR pattern. However, she was too tentative to attempt stair descent without UE support and require touchdown support on PT's arms to complete activity. Pt's mobility remains limited by decreased balance, impaired motor control, BLE eccentric weakness, and fear of falling. Plan to incorporate BLE eccentric strengthening next session. Cont. POC as tolerated.    GOALS:   Short term goals: 4 weeks, pt agrees to goals set.  1. Pt to be (I) with established HEP  2. Pt to improve L ankle IV and EV strength scores to at least 4+/5 for improved ankle strategy with balance.   3. Pt to improve BLE SLS scores to at least 3 sec consistently for improved safety with standing ADL  4. Pt to improve GST condition 2 to at least 30 seconds for improved balance and decreased fall risk  5. Pt to improve GST condition 3 to at least 15 seconds for improved balance and decreased fall risk  6. Pt to improve GST condition 4 to at least 8 " seconds for improved balance and decreased fall risk  7. Pt to improve GST condition 6 to at least 10 seconds for improved balance and decreased fall risk  8. Pt to improve FGA score to at least 23/30 for improved balance and decreased fall risk     Long term goals: 8 weeks, pt agrees to goals set  9. Pt to improve BLE SLS scores to at least 6 sec consistently for improved safety with standing ADL  10. Pt to improve GST condition 3 to at least 20 seconds for improved balance and decreased fall risk  11. Pt to improve GST condition 4 to at least 12 seconds for improved balance and decreased fall risk  12. Pt to improve GST condition 6 to at least 20 seconds for improved balance and decreased fall risk  13. Pt to improve FGA score to at least 26/30 for improved balance and decreased fall risk       Plan   Continue PT 2 x weekly under established Plan of Care, with treatment to include: pt education, HEP, therapeutic exercises, neuromuscular re-education/balance exercises, therapeutic activities, joint mobilizations, and modalities PRN, to work towards established goals. Pt may be seen by PTA to carry out plan of care.     Viridiana Matthew, PT   12/06/2018

## 2018-12-10 ENCOUNTER — DOCUMENTATION ONLY (OUTPATIENT)
Dept: REHABILITATION | Facility: HOSPITAL | Age: 63
End: 2018-12-10

## 2018-12-10 NOTE — PROGRESS NOTES
PT/PTA met face to face to discuss pt's treatment plan and progress towards established goals. Continue with current PT POC. Pt will be seen by physical therapist at least every 6th treatment day or every 30 days, whichever occurs first.      Eder Carr, PTA  12/10/2018

## 2018-12-12 ENCOUNTER — CLINICAL SUPPORT (OUTPATIENT)
Dept: REHABILITATION | Facility: HOSPITAL | Age: 63
End: 2018-12-12
Attending: SPECIALIST
Payer: COMMERCIAL

## 2018-12-12 DIAGNOSIS — F40.298 FEAR OF FALLING: ICD-10-CM

## 2018-12-12 DIAGNOSIS — Z74.09 IMPAIRED FUNCTIONAL MOBILITY, BALANCE, AND ENDURANCE: ICD-10-CM

## 2018-12-12 PROCEDURE — 97112 NEUROMUSCULAR REEDUCATION: CPT | Mod: PO

## 2018-12-12 PROCEDURE — 97110 THERAPEUTIC EXERCISES: CPT | Mod: PO

## 2018-12-12 NOTE — PROGRESS NOTES
"                                                    Physical Therapy Progress Note     Name: Shira Vega  Clinic Number: [id number]  Diagnosis:   Encounter Diagnoses   Name Primary?    Impaired functional mobility, balance, and endurance     Fear of falling      Physician: ДМИТРИЙ Macdonald MD  Treatment Orders: PT Eval and Treat  Past Medical History:   Diagnosis Date    Basilar skull fracture 1/27/2015    Bilateral fracture of pubic rami 1/27/2015    Cataract     Fracture of ribs, multiple, closed 1/27/2015    HTN (hypertension), benign     Hyperlipidemia LDL goal <160     Hypothyroidism     MVP (mitral valve prolapse)        Precautions: fall, visual deficits, standard.  Visit #: 5  Date of Eval: 11/20/18  Plan of Care Expiration: 1/15/19    G codes 5/10    FSW  SSWS On ST caseload?no         Subjective   Pt reports: that she is doing well today. She visited her  [place] this weekend.   Pain Scale:  denied    Objective     Patient received  Shira received therapeutic exercises to develop strength, endurance, ROM, flexibility, posture and core stabilization for 15 minutes including: therapy with activities as follows:     X 10 min upright stationary bike L6 for CV endurance and B ankle ROM    3 x 30" BLE GS stretch on incline with BUE support    Ascend 4 steps x 2 trials, reciprocally 1-0 UE support  Descend 4 steps x 2 trials, reciprocally 1-0 UE support    Patient participated in neuromuscular re-education activities to improve: Balance, Coordination, Sense and Proprioception for 30 minutes. The following activities were included:       2 x 30" each LE in front, tandem stance, occ touchdown support, CGA    Foam pad:   X 30" static stance with EO, no UE support   3 x 30" static stance with EC, occ touchdown support, Min A    SLS with alternate LE  on 6" step,  W/chest press on 4.4lb med ball 2 x 10 reps  SLS with contralateral foot on 6" step w/shoulder chop 4.4lb med ball 2 x 10 reps    Hand and " retrieve med ball from over shoulder to over shoulder x 20 reps B  Heel raises x 20 no UE sup  Toe raises x 20 no UE sup    Written Home Exercises:     Pt demo good understanding of the education provided. Shira demonstrated good return demonstration of activities.     Education provided re: POC, HEP  No spiritual or educational barriers to learning provided    Pt has no cultural, educational or language barriers to learning provided.    Assessment   Shira tolerated therapy session well this morning. Less tentative about with decreased hand support. Cont. POC as tolerated.    GOALS:   Short term goals: 4 weeks, pt agrees to goals set.  1. Pt to be (I) with established HEP  2. Pt to improve L ankle IV and EV strength scores to at least 4+/5 for improved ankle strategy with balance.   3. Pt to improve BLE SLS scores to at least 3 sec consistently for improved safety with standing ADL  4. Pt to improve GST condition 2 to at least 30 seconds for improved balance and decreased fall risk  5. Pt to improve GST condition 3 to at least 15 seconds for improved balance and decreased fall risk  6. Pt to improve GST condition 4 to at least 8 seconds for improved balance and decreased fall risk  7. Pt to improve GST condition 6 to at least 10 seconds for improved balance and decreased fall risk  8. Pt to improve FGA score to at least 23/30 for improved balance and decreased fall risk     Long term goals: 8 weeks, pt agrees to goals set  9. Pt to improve BLE SLS scores to at least 6 sec consistently for improved safety with standing ADL  10. Pt to improve GST condition 3 to at least 20 seconds for improved balance and decreased fall risk  11. Pt to improve GST condition 4 to at least 12 seconds for improved balance and decreased fall risk  12. Pt to improve GST condition 6 to at least 20 seconds for improved balance and decreased fall risk  13. Pt to improve FGA score to at least 26/30 for improved balance and decreased fall  risk       Plan   Continue PT 2 x weekly under established Plan of Care, with treatment to include: pt education, HEP, therapeutic exercises, neuromuscular re-education/balance exercises, therapeutic activities, joint mobilizations, and modalities PRN, to work towards established goals. Pt may be seen by PTA to carry out plan of care.     Logan Smith, PTA   12/12/2018

## 2018-12-14 ENCOUNTER — CLINICAL SUPPORT (OUTPATIENT)
Dept: REHABILITATION | Facility: HOSPITAL | Age: 63
End: 2018-12-14
Attending: SPECIALIST
Payer: COMMERCIAL

## 2018-12-14 DIAGNOSIS — F40.298 FEAR OF FALLING: ICD-10-CM

## 2018-12-14 DIAGNOSIS — Z74.09 IMPAIRED FUNCTIONAL MOBILITY, BALANCE, AND ENDURANCE: ICD-10-CM

## 2018-12-14 PROCEDURE — 97112 NEUROMUSCULAR REEDUCATION: CPT | Mod: PO

## 2018-12-14 PROCEDURE — 97110 THERAPEUTIC EXERCISES: CPT | Mod: PO

## 2018-12-14 NOTE — PROGRESS NOTES
"                                                    Physical Therapy Progress Note     Name: Shira Vega  Clinic Number: [id number]  Diagnosis:   Encounter Diagnoses   Name Primary?    Impaired functional mobility, balance, and endurance     Fear of falling      Physician: ДМИТРИЙ Macdonald MD  Treatment Orders: PT Eval and Treat  Past Medical History:   Diagnosis Date    Basilar skull fracture 1/27/2015    Bilateral fracture of pubic rami 1/27/2015    Cataract     Fracture of ribs, multiple, closed 1/27/2015    HTN (hypertension), benign     Hyperlipidemia LDL goal <160     Hypothyroidism     MVP (mitral valve prolapse)        Precautions: fall, visual deficits, standard.  Visit #: 6  Date of Eval: 11/20/18  Plan of Care Expiration: 1/15/19    G codes 6/10    FSW  SSWS On ST caseload?no         Subjective   Pt reports: that she is doing well today  Pain Scale:  denied    Objective     Patient received  Shira received therapeutic exercises to develop strength, endurance, ROM, flexibility, posture and core stabilization for 15 minutes including: therapy with activities as follows:     X 10 min upright stationary bike L6 for CV endurance and B ankle ROM    3 x 30" BLE GS stretch on incline with BUE support    Ascend 4 steps x 10 trials, reciprocally 0 UE support  Descend 4 steps x 10 trials, reciprocally 0 UE support    Patient participated in neuromuscular re-education activities to improve: Balance, Coordination, Sense and Proprioception for 30 minutes. The following activities were included:       2 x 30" each LE in front, tandem stance, occ touchdown support, CGA    Foam pad:   X 30" static stance with EO, no UE support   3 x 30" static stance with EC, occ touchdown support, Min A    SLS with alternate LE  on 6" step,  W/chest press on 4.4lb med ball 2 x 10 reps  SLS with contralateral foot on 6" step w/shoulder chop 4.4lb med ball 2 x 10 reps    Hand and retrieve med ball from over shoulder to over " shoulder x 20 reps B  Heel raises x 20 no UE sup  Toe raises x 20 no UE sup    Ambulation in andersen 150' x 2 with simultaneous self catch and toss soccer ball (min veering)  Ambulation in andersen 150 x 1 with R<>L head turns (non veering)    Written Home Exercises:     Pt demo good understanding of the education provided. Shira demonstrated good return demonstration of activities.     Education provided re: POC, HEP  No spiritual or educational barriers to learning provided    Pt has no cultural, educational or language barriers to learning provided.    Assessment   Shira tolerated therapy session well this morning. Able to ascend/descend step w/o UE support. Cont. POC as tolerated.    GOALS:   Short term goals: 4 weeks, pt agrees to goals set.  1. Pt to be (I) with established HEP  2. Pt to improve L ankle IV and EV strength scores to at least 4+/5 for improved ankle strategy with balance.   3. Pt to improve BLE SLS scores to at least 3 sec consistently for improved safety with standing ADL  4. Pt to improve GST condition 2 to at least 30 seconds for improved balance and decreased fall risk  5. Pt to improve GST condition 3 to at least 15 seconds for improved balance and decreased fall risk  6. Pt to improve GST condition 4 to at least 8 seconds for improved balance and decreased fall risk  7. Pt to improve GST condition 6 to at least 10 seconds for improved balance and decreased fall risk  8. Pt to improve FGA score to at least 23/30 for improved balance and decreased fall risk     Long term goals: 8 weeks, pt agrees to goals set  9. Pt to improve BLE SLS scores to at least 6 sec consistently for improved safety with standing ADL  10. Pt to improve GST condition 3 to at least 20 seconds for improved balance and decreased fall risk  11. Pt to improve GST condition 4 to at least 12 seconds for improved balance and decreased fall risk  12. Pt to improve GST condition 6 to at least 20 seconds for improved balance and  decreased fall risk  13. Pt to improve FGA score to at least 26/30 for improved balance and decreased fall risk       Plan   Continue PT 2 x weekly under established Plan of Care, with treatment to include: pt education, HEP, therapeutic exercises, neuromuscular re-education/balance exercises, therapeutic activities, joint mobilizations, and modalities PRN, to work towards established goals. Pt may be seen by PTA to carry out plan of care.     Logan Smith, PTA   12/14/2018

## 2018-12-17 ENCOUNTER — CLINICAL SUPPORT (OUTPATIENT)
Dept: REHABILITATION | Facility: HOSPITAL | Age: 63
End: 2018-12-17
Attending: SPECIALIST
Payer: COMMERCIAL

## 2018-12-17 DIAGNOSIS — F40.298 FEAR OF FALLING: ICD-10-CM

## 2018-12-17 DIAGNOSIS — Z74.09 IMPAIRED FUNCTIONAL MOBILITY, BALANCE, AND ENDURANCE: ICD-10-CM

## 2018-12-17 PROCEDURE — 97110 THERAPEUTIC EXERCISES: CPT | Mod: PO

## 2018-12-17 PROCEDURE — 97112 NEUROMUSCULAR REEDUCATION: CPT | Mod: PO

## 2018-12-17 NOTE — PROGRESS NOTES
"                                                    Physical Therapy Progress Note     Name: Shira Vega  Clinic Number: [id number]  Diagnosis:   Encounter Diagnoses   Name Primary?    Impaired functional mobility, balance, and endurance     Fear of falling      Physician: ДМИТРИЙ Macdonald MD  Treatment Orders: PT Eval and Treat  Past Medical History:   Diagnosis Date    Basilar skull fracture 1/27/2015    Bilateral fracture of pubic rami 1/27/2015    Cataract     Fracture of ribs, multiple, closed 1/27/2015    HTN (hypertension), benign     Hyperlipidemia LDL goal <160     Hypothyroidism     MVP (mitral valve prolapse)        Precautions: fall, visual deficits, standard.  Visit #: 7  Date of Eval: 11/20/18  Plan of Care Expiration: 1/15/19    G codes 7/10    FSW  SSWS On ST caseload?no         Subjective   Pt reports: that she is "shaky" today.  Pain Scale:  denied    Objective     Patient received  Shira received therapeutic exercises to develop strength, endurance, ROM, flexibility, posture and core stabilization for 15 minutes including: therapy with activities as follows:     X 10 min upright stationary bike L6 for CV endurance and B ankle ROM    3 x 30" BLE GS stretch on incline with BUE support    Ascend 4 steps x 10 trials, reciprocally 0 UE support NP  Descend 4 steps x 10 trials, reciprocally 0 UE support NP    Patient participated in neuromuscular re-education activities to improve: Balance, Coordination, Sense and Proprioception for 30 minutes. The following activities were included:       2 x 30" each LE in front, tandem stance, occ touchdown support, CGA    Foam pad:   X 30" static stance with EO, no UE support   3 x 30" static stance with EC, occ touchdown support, Min A    SLS with alternate LE  on 6" step,  W/chest press on 4.4lb med ball 2 x 10 reps  SLS with contralateral foot on 6" step w/shoulder chop 4.4lb med ball 2 x 10 reps    Hand and retrieve med ball from over shoulder to over " shoulder x 20 reps B  Heel raises x 20 no UE sup  Toe raises x 20 no UE sup    Ambulation in andersen 150' x 2 with simultaneous self catch and toss soccer ball (min veering)  Ambulation in andersen 150 x 1 with R<>L head turns (min veering)    Written Home Exercises:     Pt demo good understanding of the education provided. Shira demonstrated good return demonstration of activities.     Education provided re: POC, HEP  No spiritual or educational barriers to learning provided    Pt has no cultural, educational or language barriers to learning provided.    Assessment   Shira tolerated therapy session fair this morning. More tentative than last visit and less able to release hand support. Noted more tremulus LEs in flexion. Cont. POC as tolerated.    GOALS:   Short term goals: 4 weeks, pt agrees to goals set.  1. Pt to be (I) with established HEP  2. Pt to improve L ankle IV and EV strength scores to at least 4+/5 for improved ankle strategy with balance.   3. Pt to improve BLE SLS scores to at least 3 sec consistently for improved safety with standing ADL  4. Pt to improve GST condition 2 to at least 30 seconds for improved balance and decreased fall risk  5. Pt to improve GST condition 3 to at least 15 seconds for improved balance and decreased fall risk  6. Pt to improve GST condition 4 to at least 8 seconds for improved balance and decreased fall risk  7. Pt to improve GST condition 6 to at least 10 seconds for improved balance and decreased fall risk  8. Pt to improve FGA score to at least 23/30 for improved balance and decreased fall risk     Long term goals: 8 weeks, pt agrees to goals set  9. Pt to improve BLE SLS scores to at least 6 sec consistently for improved safety with standing ADL  10. Pt to improve GST condition 3 to at least 20 seconds for improved balance and decreased fall risk  11. Pt to improve GST condition 4 to at least 12 seconds for improved balance and decreased fall risk  12. Pt to improve GST  condition 6 to at least 20 seconds for improved balance and decreased fall risk  13. Pt to improve FGA score to at least 26/30 for improved balance and decreased fall risk       Plan   Continue PT 2 x weekly under established Plan of Care, with treatment to include: pt education, HEP, therapeutic exercises, neuromuscular re-education/balance exercises, therapeutic activities, joint mobilizations, and modalities PRN, to work towards established goals. Pt may be seen by PTA to carry out plan of care.     Logan Smith, PTA   12/17/2018

## 2018-12-20 ENCOUNTER — CLINICAL SUPPORT (OUTPATIENT)
Dept: REHABILITATION | Facility: HOSPITAL | Age: 63
End: 2018-12-20
Attending: SPECIALIST
Payer: COMMERCIAL

## 2018-12-20 DIAGNOSIS — F40.298 FEAR OF FALLING: ICD-10-CM

## 2018-12-20 DIAGNOSIS — Z74.09 IMPAIRED FUNCTIONAL MOBILITY, BALANCE, AND ENDURANCE: ICD-10-CM

## 2018-12-20 PROCEDURE — 97110 THERAPEUTIC EXERCISES: CPT | Mod: PO

## 2018-12-20 PROCEDURE — 97112 NEUROMUSCULAR REEDUCATION: CPT | Mod: PO

## 2018-12-20 NOTE — PROGRESS NOTES
"                                                    Physical Therapy Progress Note     Name: Shira Vega  Clinic Number: [id number]  Diagnosis:   Encounter Diagnoses   Name Primary?    Impaired functional mobility, balance, and endurance     Fear of falling      Physician: ДМИТРИЙ Macdonald MD  Treatment Orders: PT Eval and Treat  Past Medical History:   Diagnosis Date    Basilar skull fracture 1/27/2015    Bilateral fracture of pubic rami 1/27/2015    Cataract     Fracture of ribs, multiple, closed 1/27/2015    HTN (hypertension), benign     Hyperlipidemia LDL goal <160     Hypothyroidism     MVP (mitral valve prolapse)        Precautions: fall, visual deficits, standard.  Visit #: 8  Date of Eval: 11/20/18  Plan of Care Expiration: 1/15/19    G codes 8/10    FSW  SSWS On ST caseload?no         Subjective   Pt reports: that she is doing better today.   Pain Scale:  denied    Objective     Patient received  Shira received therapeutic exercises to develop strength, endurance, ROM, flexibility, posture and core stabilization for 20 minutes including: therapy with activities as follows:     X 10 min upright stationary bike L6 for CV endurance and B ankle ROM    3 x 30" BLE GS stretch on incline with BUE support    X 30 BLE heel raises with BUE touchdown support    2 x 10 each LE, SL eccentric lowers to tap alternate heel to ground from 5" step, BUE support, CGA    Patient participated in neuromuscular re-education activities to improve: Balance, Coordination, Sense and Proprioception for 25 minutes. The following activities were included:     X 4 laps alternating marching, 3" hold, 1 finger touchdown support, SBA    2 x 30" each LE in front, tandem stance, no UE support, CGA to occ Min A    Foam pad:   X 30" static stance with EO, no UE support, CGA   3 x 30" static stance with EC, occ touchdown support, CGA to Min A   X 30" R<>L head turns, no UE support, CGA   X 30" up <> down  head turns, no UE support, " CGA    2 x 10 each LE, SLS with alternate LE tapping 2 medium cones placed in front, CGA to occ Min A, 1 finger touchdown support    X 2 laps x 100 feet forward ambulation in closed hallway with R<>L head turns, slight veering, no LOB, CGA  X 2 laps x 100 feet forward ambulation in closed hallway with up <> down head turns, slight veering, no LOB, CGA    Written Home Exercises:     Pt demo good understanding of the education provided. Shira demonstrated good return demonstration of activities.     Education provided re: POC, HEP  No spiritual or educational barriers to learning provided    Pt has no cultural, educational or language barriers to learning provided.    Assessment   Shira tolerated therapy session fair this morning. She remains tentative when performing SLS activities, so PT allowed patient to utilize 1 finger touchdown support during these activities. Pt demonstrates improved balance with vision eliminated on foam pad, but she did require occ (A) from PT to prevent LOB. Plan to continue to progress SLS activities and vision eliminated activities as tolerated since these activities remain most challenging at this time. . Cont. POC.     GOALS:   Short term goals: 4 weeks, pt agrees to goals set.  1. Pt to be (I) with established HEP  2. Pt to improve L ankle IV and EV strength scores to at least 4+/5 for improved ankle strategy with balance.   3. Pt to improve BLE SLS scores to at least 3 sec consistently for improved safety with standing ADL  4. Pt to improve GST condition 2 to at least 30 seconds for improved balance and decreased fall risk  5. Pt to improve GST condition 3 to at least 15 seconds for improved balance and decreased fall risk  6. Pt to improve GST condition 4 to at least 8 seconds for improved balance and decreased fall risk  7. Pt to improve GST condition 6 to at least 10 seconds for improved balance and decreased fall risk  8. Pt to improve FGA score to at least 23/30 for improved  balance and decreased fall risk     Long term goals: 8 weeks, pt agrees to goals set  9. Pt to improve BLE SLS scores to at least 6 sec consistently for improved safety with standing ADL  10. Pt to improve GST condition 3 to at least 20 seconds for improved balance and decreased fall risk  11. Pt to improve GST condition 4 to at least 12 seconds for improved balance and decreased fall risk  12. Pt to improve GST condition 6 to at least 20 seconds for improved balance and decreased fall risk  13. Pt to improve FGA score to at least 26/30 for improved balance and decreased fall risk       Plan   Continue PT 2 x weekly under established Plan of Care, with treatment to include: pt education, HEP, therapeutic exercises, neuromuscular re-education/balance exercises, therapeutic activities, joint mobilizations, and modalities PRN, to work towards established goals. Pt may be seen by PTA to carry out plan of care.     Viridiana Matthew, PT   12/20/2018

## 2018-12-24 ENCOUNTER — CLINICAL SUPPORT (OUTPATIENT)
Dept: REHABILITATION | Facility: HOSPITAL | Age: 63
End: 2018-12-24
Attending: SPECIALIST
Payer: COMMERCIAL

## 2018-12-24 DIAGNOSIS — F40.298 FEAR OF FALLING: ICD-10-CM

## 2018-12-24 DIAGNOSIS — Z74.09 IMPAIRED FUNCTIONAL MOBILITY, BALANCE, AND ENDURANCE: ICD-10-CM

## 2018-12-24 PROCEDURE — 97110 THERAPEUTIC EXERCISES: CPT | Mod: PO

## 2018-12-24 PROCEDURE — 97112 NEUROMUSCULAR REEDUCATION: CPT | Mod: PO

## 2018-12-24 RX ORDER — PRAVASTATIN SODIUM 20 MG/1
20 TABLET ORAL DAILY
Qty: 90 TABLET | Refills: 2 | Status: SHIPPED | OUTPATIENT
Start: 2018-12-24 | End: 2019-09-28 | Stop reason: SDUPTHER

## 2018-12-24 NOTE — PROGRESS NOTES
"                                                    Physical Therapy Progress Note     Name: Shira Vega  Clinic Number: [id number]  Diagnosis:   Encounter Diagnoses   Name Primary?    Impaired functional mobility, balance, and endurance     Fear of falling      Physician: ДМИТРИЙ Macdonald MD  Treatment Orders: PT Eval and Treat  Past Medical History:   Diagnosis Date    Basilar skull fracture 1/27/2015    Bilateral fracture of pubic rami 1/27/2015    Cataract     Fracture of ribs, multiple, closed 1/27/2015    HTN (hypertension), benign     Hyperlipidemia LDL goal <160     Hypothyroidism     MVP (mitral valve prolapse)        Precautions: fall, visual deficits, standard.  Visit #: 9  Date of Eval: 11/20/18  Plan of Care Expiration: 1/15/19    G codes no    On ST caseload?no         Subjective   Pt reports: that she is doing OK today.  Pain Scale:  denied    Objective     Patient received  Shira received therapeutic exercises to develop strength, endurance, ROM, flexibility, posture and core stabilization for 20 minutes including: therapy with activities as follows:     X 10 min upright stationary bike L6 for CV endurance and B ankle ROM    3 x 30" BLE GS stretch on incline with BUE support    Lower Extremity Strength  Right LE 11/20/18 12/24/18 Left LE 11/20/18 12/24/18   Hip Flexion: 5/5 5/5 Hip Flexion: 4+/5 5/5   Hip Extension:  4+/5 4+/5 Hip Extension: 4+/5 4+/5   Hip Abduction: 5/5 5/5 Hip Abduction: 5/5 5/5   Hip Adduction: 5/5 5/5 Hip Adduction 5/5 5/5   Knee Extension: 5/5 5/5 Knee Extension: 5/5 5/5   Knee Flexion: 5/5 5/5 Knee Flexion: 5/5 5/5   Ankle Dorsiflexion: 5/5 5/5 Ankle Dorsiflexion: 5/5 5/5   Ankle Plantarflexion: 5/5 5/5 Ankle Plantarflexion: 5/5 5/5   Ankle Inversion 5/5 5/5 Ankle Inversion 4/5 5/5   Ankle Eversion 5/5 5/5 Ankle Eversion 4/5 5/5      Patient participated in neuromuscular re-education activities to improve: Balance for 25 minutes. The following activities were " "included:          Evaluation 12/24/18   Single Limb Stance R LE 2 sec  (<10 sec = HIGH FALL RISK) 7 sec  (<10 sec = HIGH FALL RISK)   Single Limb Stance L LE 1 sec  (<10 sec = HIGH FALL RISK) 2 sec  (<10 sec = HIGH FALL RISK)   30 second Chair Rise 14 times s/ UE support- wide GAYATRI NT         JENNIFER SENSORY TESTING:  (P= Pass, F= Fail; note any sway; hold each position for 30")  Condition 1: (firm surface/feet together/eyes open) P  Condition 2: (firm surface/feet together/eyes closed) P  Condition 3: (firm surface/feet in tandem/eyes open) F, 7 sec  Condition 4: (firm surface/feet in tandem/eyes closed) F, 2 sec  Condition 5: (soft surface/feet together/eyes open) P  Condition 6: (soft surface/feet together/eyes closed) F, 1 sec  Condition 7: (Fakuda step test), measure distance varied from center starting position NT     Functional Gait Assessment:   1. Gait on level surface =  3  2. Change in Gait Speed = 3  3. Gait with horizontal head turns  = 2  4. Gait with vertical head turns = 3  5. Gait with pivot turns = 2  6. Step over obstacle = 3  7. Gait with Narrow GAYATRI = 1  8. Gait with eyes closed = 2  9. Ambulating Backwards = 2  10. Steps = 2     Score 23/30     Foam fitter at [facility] bar: no UE support     Static stand with EO, 1 x30 " with SBA     Static stand with EC, 2 x 30" with CGA     Static stand with EO, head turns to R and L, CGA     Static stand with EC, head turns to R and L, CGA     Static stand with EO, head up and down, CGA     Static stand with EC, head up and down, CGA          Written Home Exercises: Cont HEP.   Pt demo good understanding of the education provided. Shira demonstrated good return demonstration of activities.     Education provided re: POC, HEP  No spiritual or educational barriers to learning provided    Pt has no cultural, educational or language barriers to learning provided.    Assessment   Shira tolerated therapy session well this morning. Pt reassessed to determine progress with " therapy. Pt has made good progress with therapy. She demonstrates good improvement in L ankle strength. Pt demonstrates much improved RLE SLS score; however, she demonstrates only 1 sec improvement on LLE SLS score compared to last assessment with her current LLE SLS score placing her at an increased fall risk. She demonstrates some improvement on GST, with most improvement noted on condition 2. Pt's balance remains highly challenged for conditions 3, 4 and 6 which involve narrow GAYATRI, soft surface, and vision eliminated. During pt's attempts of GST conditions, pt still remains tentative to remover her hands from // bars and grabs for // bars with any slight perturbation of her balance. Pt becomes noticeable uncomfortable when her balance is challenged with results in her rushing through the task or reaching for UE support.  Pt does demonstrate improved FGA score compared to prior performance with most difficulty condition remaining ambulation with narrow GAYATRI. At this time, pt's mobility remains limited by decreased balance, her apprehension when attempting more challenging balance tasks, and impaired equilibrium. Cont POC to further progress remaining mobility deficits to improve safety with functional mobility and to decrease risk of fall.      GOALS:   Short term goals: 4 weeks, pt agrees to goals set.  1. Pt to be (I) with established HEP MET  2. Pt to improve L ankle IV and EV strength scores to at least 4+/5 for improved ankle strategy with balance. MET  3. Pt to improve BLE SLS scores to at least 3 sec consistently for improved safety with standing ADL Progressing  4. Pt to improve GST condition 2 to at least 30 seconds for improved balance and decreased fall risk MET  5. Pt to improve GST condition 3 to at least 15 seconds for improved balance and decreased fall risk  6. Pt to improve GST condition 4 to at least 8 seconds for improved balance and decreased fall risk  7. Pt to improve GST condition 6 to at  least 10 seconds for improved balance and decreased fall risk  8. Pt to improve FGA score to at least 23/30 for improved balance and decreased fall risk MET     Long term goals: 8 weeks, pt agrees to goals set  9. Pt to improve BLE SLS scores to at least 6 sec consistently for improved safety with standing ADL  10. Pt to improve GST condition 3 to at least 20 seconds for improved balance and decreased fall risk  11. Pt to improve GST condition 4 to at least 12 seconds for improved balance and decreased fall risk  12. Pt to improve GST condition 6 to at least 20 seconds for improved balance and decreased fall risk  13. Pt to improve FGA score to at least 26/30 for improved balance and decreased fall risk       Plan   Continue PT 2 x weekly under established Plan of Care, with treatment to include: pt education, HEP, therapeutic exercises, neuromuscular re-education/balance exercises, therapeutic activities, joint mobilizations, and modalities PRN, to work towards established goals. Pt may be seen by PTA to carry out plan of care.     Viridiana Matthew, PT   12/24/2018

## 2019-01-04 ENCOUNTER — CLINICAL SUPPORT (OUTPATIENT)
Dept: REHABILITATION | Facility: HOSPITAL | Age: 64
End: 2019-01-04
Attending: SPECIALIST
Payer: COMMERCIAL

## 2019-01-04 ENCOUNTER — TELEPHONE (OUTPATIENT)
Dept: OTOLARYNGOLOGY | Facility: CLINIC | Age: 64
End: 2019-01-04

## 2019-01-04 DIAGNOSIS — Z74.09 IMPAIRED FUNCTIONAL MOBILITY, BALANCE, AND ENDURANCE: ICD-10-CM

## 2019-01-04 DIAGNOSIS — F40.298 FEAR OF FALLING: ICD-10-CM

## 2019-01-04 PROCEDURE — 97110 THERAPEUTIC EXERCISES: CPT | Mod: PO

## 2019-01-04 PROCEDURE — 97112 NEUROMUSCULAR REEDUCATION: CPT | Mod: PO

## 2019-01-04 NOTE — PROGRESS NOTES
"                                                    Physical Therapy Progress Note     Name: Shira Vega  Clinic Number: [id number]  Diagnosis:   Encounter Diagnoses   Name Primary?    Impaired functional mobility, balance, and endurance     Fear of falling      Physician: ДМИТРИЙ Macdonald MD  Treatment Orders: PT Eval and Treat  Past Medical History:   Diagnosis Date    Basilar skull fracture 1/27/2015    Bilateral fracture of pubic rami 1/27/2015    Cataract     Fracture of ribs, multiple, closed 1/27/2015    HTN (hypertension), benign     Hyperlipidemia LDL goal <160     Hypothyroidism     MVP (mitral valve prolapse)        Precautions: fall, visual deficits, standard.  Visit #: 10  Date of Eval: 11/20/18  Plan of Care Expiration: 1/15/19    G codes no    On ST caseload?no         Subjective   Pt reports: she is doing pretty well today, but continues to have difficulty with descending stairs and continues to become nervous/anxious in activities with no UE support.  Pain Scale:  denied    Objective     Patient received  Shira received therapeutic exercises to develop strength, endurance, ROM, flexibility, posture and core stabilization for 15 minutes including: therapy with activities as follows:     X 10 min upright stationary bike L6 for CV endurance and B ankle ROM    3 x 30" BLE GS stretch on incline with BUE support    X 20 Heel raises with BUE touchdown support    Patient participated in neuromuscular re-education activities to improve: Balance for 25 minutes. The following activities were included:     Firm ground:   X 30" NBOS on firm ground, EO - no LOB; CGA   X 30" semi tandem on firm ground, - no LOB; CGA   X 30" tandem on firm ground - 1 minor LOB correct with 1 UE touchdown support; CGA   4in block: alternating toe taps x 30"; SBA, no LOB   6in block: alternating toe taps x 30"; SBA, x 1 minor LOB when pt's toe caught the step, but balance regained using ankle strategy.   8in block: " "alternating toe taps x 30"; SBA, x 1 minor LOB when pt's toe caught the step, but balance regained using ankle strategy.    Foam Pad   X 30" NBOS EO - no LOB   2 x 30" NBOS EC - 2 minor LOB, pt corrected with 1 UE touchdown support; CGA   X 30" in Semi-tandem - 1 UE touchdown support to correct for minor LOBs; CGA   3 x 30" tandem stance - 1 UE touchdpwn support to correct for LOBs when necesary           Written Home Exercises: Cont HEP.   Pt demo good understanding of the education provided. Shira demonstrated good return demonstration of activities.     Education provided re: POC, HEP  No spiritual or educational barriers to learning provided    Pt has no cultural, educational or language barriers to learning provided.    Assessment   Shira tolerated therapy session well this morning. Pt continues to be noticeably uncomfortable and anxious with removing UE support during balance activities. Pt's anxiety and fear appears to be the largest barrier at this time. PT is combating this with education on normal balance and starting the session with there-ex designed to increase her confidence and make her feel safe. PT started balance activities with CGA to increase patient's feelings of safety before  Pt demonstrated appropriate ankle strategy for regaining balance after minor LOBs during activities and did well with PT today. Pt was educated on the roll panic and anxiety can have on her balance. PT will continue to educate patient, improve confidence in more challengeing balance activities, and increase treatment difficulty as patient tolerates.Cont POC to further progress remaining mobility deficits to improve safety with functional mobility and to decrease risk of fall.      GOALS:   Short term goals: 4 weeks, pt agrees to goals set.  1. Pt to be (I) with established HEP MET  2. Pt to improve L ankle IV and EV strength scores to at least 4+/5 for improved ankle strategy with balance. MET  3. Pt to improve BLE SLS " scores to at least 3 sec consistently for improved safety with standing ADL Progressing  4. Pt to improve GST condition 2 to at least 30 seconds for improved balance and decreased fall risk MET  5. Pt to improve GST condition 3 to at least 15 seconds for improved balance and decreased fall risk  6. Pt to improve GST condition 4 to at least 8 seconds for improved balance and decreased fall risk  7. Pt to improve GST condition 6 to at least 10 seconds for improved balance and decreased fall risk  8. Pt to improve FGA score to at least 23/30 for improved balance and decreased fall risk MET     Long term goals: 8 weeks, pt agrees to goals set  9. Pt to improve BLE SLS scores to at least 6 sec consistently for improved safety with standing ADL  10. Pt to improve GST condition 3 to at least 20 seconds for improved balance and decreased fall risk  11. Pt to improve GST condition 4 to at least 12 seconds for improved balance and decreased fall risk  12. Pt to improve GST condition 6 to at least 20 seconds for improved balance and decreased fall risk  13. Pt to improve FGA score to at least 26/30 for improved balance and decreased fall risk       Plan   Continue PT 2 x weekly under established Plan of Care, with treatment to include: pt education, HEP, therapeutic exercises, neuromuscular re-education/balance exercises, therapeutic activities, joint mobilizations, and modalities PRN, to work towards established goals. Pt may be seen by PTA to carry out plan of care.     Viridiana Matthew, PT   01/04/2019

## 2019-01-04 NOTE — TELEPHONE ENCOUNTER
----- Message from Lakshmi Mendoza sent at 1/4/2019 12:12 PM CST -----  Contact: Pt   Name of Who is Calling: BALWINDER URBANO [[id number]]      What is the request in detail: Patient states she only has 1 pill left of ranitidine (ZANTAC) 150 MG tablet, requesting a refill and the staff contact her once the medication is called into the pharmacy. Please contact to further discuss and advise      Can the clinic reply by [facility]: No       What Number to Call Back if not [facility]:[phone]

## 2019-01-04 NOTE — TELEPHONE ENCOUNTER
Call and Talk with Pharmacy in regards to Ms Vega meds. Pt is able to  the other half of her meds.

## 2019-01-08 ENCOUNTER — DOCUMENTATION ONLY (OUTPATIENT)
Dept: REHABILITATION | Facility: HOSPITAL | Age: 64
End: 2019-01-08

## 2019-01-08 ENCOUNTER — CLINICAL SUPPORT (OUTPATIENT)
Dept: REHABILITATION | Facility: HOSPITAL | Age: 64
End: 2019-01-08
Attending: SPECIALIST
Payer: COMMERCIAL

## 2019-01-08 DIAGNOSIS — Z74.09 IMPAIRED FUNCTIONAL MOBILITY, BALANCE, AND ENDURANCE: ICD-10-CM

## 2019-01-08 DIAGNOSIS — F40.298 FEAR OF FALLING: ICD-10-CM

## 2019-01-08 PROCEDURE — 97110 THERAPEUTIC EXERCISES: CPT | Mod: PO

## 2019-01-08 PROCEDURE — 97112 NEUROMUSCULAR REEDUCATION: CPT | Mod: PO

## 2019-01-08 NOTE — PROGRESS NOTES
"                                                    Physical Therapy Progress Note     Name: Shira Vega  Clinic Number: [id number]  Diagnosis:   Encounter Diagnoses   Name Primary?    Impaired functional mobility, balance, and endurance     Fear of falling      Physician: ДМИТРИЙ Macdonald MD  Treatment Orders: PT Eval and Treat  Past Medical History:   Diagnosis Date    Basilar skull fracture 1/27/2015    Bilateral fracture of pubic rami 1/27/2015    Cataract     Fracture of ribs, multiple, closed 1/27/2015    HTN (hypertension), benign     Hyperlipidemia LDL goal <160     Hypothyroidism     MVP (mitral valve prolapse)        Precautions: fall, visual deficits, standard.  Visit #: 11  Date of Eval: 11/20/18  Plan of Care Expiration: 1/15/19        Subjective   Pt reports: Her left ankle is bothering her, but otherwise she is doing pretty good.  Pain Scale:  denied    Objective     Patient received  Shira received therapeutic exercises to develop strength, endurance, ROM, flexibility, posture and core stabilization for 15 minutes including: therapy with activities as follows:     X 10 min upright stationary bike L6 for CV endurance and B ankle ROM    3 x 30" BLE GS stretch on incline with BUE support    X 20 Heel raises with BUE touchdown support    Patient participated in neuromuscular re-education activities to improve: Balance for 30 minutes. The following activities were included:     Firm ground in parallel bars: Instructions to pause and take a deep breath when patient's anxiety increases and she starts rushing the exercise.   4in block: alternating toe taps x 30"; SBA, no LOB   4 in block: x 20 step ups and step back down. Stepping up with RLE. No LOB, CGA   6in block: alternating toe taps x 30"; SBA, x 1 minor LOB when pt's toe caught the step, but balance regained using ankle strategy.   6 in block: x 20 step ups and step back down. Stepping up with RLE. No LOB, CGA   8in block: alternating toe " "taps x 30"; SBA, x 1 minor LOB when pt's toe caught the step, but balance regained using ankle strategy.   8 in block: 2 x 20 step ups and step back down. Stepping up with RLE. CGA. 2 LOBS when toe caught on block when stepping down. Patient caught herself with the bars and PT gave Cesia.   8 in block: x1 step and over (stepping off the front of the block), CGA     Stairs x 4 steps of 5 inch height, with bilateral hand rails. No reciprocal stepping pattern with R foot leading on ascending and L foot leading with descending. CGA. No UE support except for touchdown with minor LOB and increased anxiety. Pt instructed to weight shift before lowering the left extremity. Pt appeared much more anxious and unsteady when progressing from 8 in block to the set of stairs despite a lower height in the steps. Particularly when descending the steps.    Parallel bars: Instructions to pause and take a deep breath when patient's anxiety increases and she starts rushing the exercise.   X 4 ambulating and stepping over red boxes, CGA (x2), SBA (x2)   x 4 ambulating and stepping over blue boxes, CGA (x2), SBA (x2)     x 10 stepping forward over and back over blue boxes, CGA. Patient's toe caught the box once and patient became flustered. After being instructed to pause and take a breath to calm down, patient continued exercise without any further LOB.     Written Home Exercises: Cont HEP.   Pt demo good understanding of the education provided. Shira demonstrated good return demonstration of activities.     Education provided re: POC, HEP  No spiritual or educational barriers to learning provided    Pt has no cultural, educational or language barriers to learning provided.    Assessment   Shira tolerated therapy session well this morning. PT started session with balance activities that patient was familiar with and performed well at last session and quickly progressed them to challenge the patient. Pt continues to be noticeably " uncomfortable and anxious with removing UE support during balance activities, however, this appeared decreased with activities involving stepping onto the block today. Pt demonstrated increased steadiness, appeared more calm, and is following instructions to pause and take a breath to calm down when necessary. Pt did become very anxious with stair activity, particularly when descending. PT is continuing to combat this with education, instructing her to break down activities, and taking calming breaths to help her focus when necessary.   PT will continue to educate patient, improve confidence in more challengeing balance activities, and increase treatment difficulty as patient tolerates.Cont POC to further progress remaining mobility deficits to improve safety with functional mobility and to decrease risk of falls.    GOALS:   Short term goals: 4 weeks, pt agrees to goals set.  1. Pt to be (I) with established HEP MET  2. Pt to improve L ankle IV and EV strength scores to at least 4+/5 for improved ankle strategy with balance. MET  3. Pt to improve BLE SLS scores to at least 3 sec consistently for improved safety with standing ADL Progressing  4. Pt to improve GST condition 2 to at least 30 seconds for improved balance and decreased fall risk MET  5. Pt to improve GST condition 3 to at least 15 seconds for improved balance and decreased fall risk  6. Pt to improve GST condition 4 to at least 8 seconds for improved balance and decreased fall risk  7. Pt to improve GST condition 6 to at least 10 seconds for improved balance and decreased fall risk  8. Pt to improve FGA score to at least 23/30 for improved balance and decreased fall risk MET     Long term goals: 8 weeks, pt agrees to goals set  9. Pt to improve BLE SLS scores to at least 6 sec consistently for improved safety with standing ADL  10. Pt to improve GST condition 3 to at least 20 seconds for improved balance and decreased fall risk  11. Pt to improve GST  condition 4 to at least 12 seconds for improved balance and decreased fall risk  12. Pt to improve GST condition 6 to at least 20 seconds for improved balance and decreased fall risk  13. Pt to improve FGA score to at least 26/30 for improved balance and decreased fall risk       Plan   Continue PT 2 x weekly under established Plan of Care, with treatment to include: pt education, HEP, therapeutic exercises, neuromuscular re-education/balance exercises, therapeutic activities, joint mobilizations, and modalities PRN, to work towards established goals. Pt may be seen by PTA to carry out plan of care.     Viridiana Matthew, PT   01/08/2019

## 2019-01-09 ENCOUNTER — DOCUMENTATION ONLY (OUTPATIENT)
Dept: REHABILITATION | Facility: HOSPITAL | Age: 64
End: 2019-01-09

## 2019-01-11 ENCOUNTER — CLINICAL SUPPORT (OUTPATIENT)
Dept: REHABILITATION | Facility: HOSPITAL | Age: 64
End: 2019-01-11
Attending: SPECIALIST
Payer: COMMERCIAL

## 2019-01-11 ENCOUNTER — DOCUMENTATION ONLY (OUTPATIENT)
Dept: REHABILITATION | Facility: HOSPITAL | Age: 64
End: 2019-01-11

## 2019-01-11 DIAGNOSIS — Z74.09 IMPAIRED FUNCTIONAL MOBILITY, BALANCE, AND ENDURANCE: ICD-10-CM

## 2019-01-11 DIAGNOSIS — F40.298 FEAR OF FALLING: ICD-10-CM

## 2019-01-11 PROCEDURE — 97112 NEUROMUSCULAR REEDUCATION: CPT | Mod: PO

## 2019-01-11 PROCEDURE — 97110 THERAPEUTIC EXERCISES: CPT | Mod: PO

## 2019-01-11 NOTE — PROGRESS NOTES
PT/PTA met face to face to discuss pt's treatment plan and progress towards established goals. Continue with current PT POC. Pt will be seen by physical therapist at least every 6th treatment day or every 30 days, whichever occurs first.      Eder Carr, PTA  01/11/2019

## 2019-01-11 NOTE — PROGRESS NOTES
"                                                    Physical Therapy Progress Note     Name: Shira Vega  Clinic Number: [id number]  Diagnosis:   Encounter Diagnoses   Name Primary?    Impaired functional mobility, balance, and endurance     Fear of falling      Physician: ДМИТРИЙ Macdonald MD  Treatment Orders: PT Eval and Treat  Past Medical History:   Diagnosis Date    Basilar skull fracture 1/27/2015    Bilateral fracture of pubic rami 1/27/2015    Cataract     Fracture of ribs, multiple, closed 1/27/2015    HTN (hypertension), benign     Hyperlipidemia LDL goal <160     Hypothyroidism     MVP (mitral valve prolapse)        Precautions: fall, visual deficits, standard.  Visit #: 12  Date of Eval: 11/20/18  Plan of Care Expiration: 1/15/19        Subjective   Pt reports: Pt reports ankles aren't bothering her today. Pt reports feeling like she had better balance after leaving therapy next week and that she feels stronger.  Pain Scale:  Denied    Objective     Patient received  Shira received therapeutic exercises to develop strength, endurance, ROM, flexibility, posture and core stabilization for 15 minutes including: therapy with activities as follows:      X 10 min upright stationary bike L6 for CV endurance and B ankle ROM     3 x 30" BLE GS stretch on incline with BUE support     2 x 10, BLE heel raise with RLE eccentric lowering; BUE support   2 x 10, BLE heel raise with LLE eccentric lowering; BUE support     Patient participated in neuromuscular re-education activities to improve: Balance for 30 minutes. The following activities were included:     Firm ground in parallel bars: Instructions to pause and take a deep breath when patient's anxiety increases and she starts rushing the exercise.         4 in block: x 20 step ups and step back down. Stepping up with RLE. No LOB, CGA, no UE support     4 in block: x 10 step up and over and back, CGA , no UE support      6 in block: x 10 step up and over, " CGA, no UE support     8 in block: x 10 step up and over, CGA, no UE support     Stairs 2 x 4 steps of 5 inch height, R hand rail. reciprocal stepping pattern CGA.   Stairs 2 x 4 steps of 5 inch height 1 finger touch down assist. Increased anxiety and shakiness. CGA  Stairs 2 x 4 steps of 5 inch height, no UE touchdown support. Increased anxiety and shakiness. CGA    Parallel bars:     X 2 laps tandem walking, 1 finger touchdown assist, CGA    X 4 laps tandem walking, no UE support. UE touch down assist for minor LOBs. Pt anxious and rushes the exercise. Frequent LOB in the first 2 laps, but patient improved on the last 2 laps and fewer LOBs.     Written Home Exercises: Cont HEP.   Pt demo good understanding of the education provided. Shira demonstrated good return demonstration of activities.     Education provided re: POC, HEP  No spiritual or educational barriers to learning provided    Pt has no cultural, educational or language barriers to learning provided.    Assessment   Shira tolerated therapy session well this morning. PT started session with balance activities that were similar, but more challenging, than activities pt performed at last session. Pt continues to be noticeably uncomfortable and anxious with removing UE support but is gaining more confidence in more challenging balance activities. Pt did appear very anxious with stair activity, when UE support was decreased and eliminated. However, with 1 UE support patient was able to ascend and descend the stairs with a reciprocal pattern and appeared steady, which she was unable to do at her previous session. PT will continue to educate patient, improve confidence in more challengeing balance activities, and increase treatment difficulty as patient tolerates.Cont POC to further progress remaining mobility deficits to improve safety with functional mobility and to decrease risk of falls.    GOALS:   Short term goals: 4 weeks, pt agrees to goals set.  1. Pt  to be (I) with established HEP MET  2. Pt to improve L ankle IV and EV strength scores to at least 4+/5 for improved ankle strategy with balance. MET  3. Pt to improve BLE SLS scores to at least 3 sec consistently for improved safety with standing ADL Progressing  4. Pt to improve GST condition 2 to at least 30 seconds for improved balance and decreased fall risk MET  5. Pt to improve GST condition 3 to at least 15 seconds for improved balance and decreased fall risk  6. Pt to improve GST condition 4 to at least 8 seconds for improved balance and decreased fall risk  7. Pt to improve GST condition 6 to at least 10 seconds for improved balance and decreased fall risk  8. Pt to improve FGA score to at least 23/30 for improved balance and decreased fall risk MET     Long term goals: 8 weeks, pt agrees to goals set  9. Pt to improve BLE SLS scores to at least 6 sec consistently for improved safety with standing ADL  10. Pt to improve GST condition 3 to at least 20 seconds for improved balance and decreased fall risk  11. Pt to improve GST condition 4 to at least 12 seconds for improved balance and decreased fall risk  12. Pt to improve GST condition 6 to at least 20 seconds for improved balance and decreased fall risk  13. Pt to improve FGA score to at least 26/30 for improved balance and decreased fall risk       Plan   Continue PT 2 x weekly under established Plan of Care, with treatment to include: pt education, HEP, therapeutic exercises, neuromuscular re-education/balance exercises, therapeutic activities, joint mobilizations, and modalities PRN, to work towards established goals. Pt may be seen by PTA to carry out plan of care.     Viridiana Matthew, PT   01/11/2019

## 2019-01-14 ENCOUNTER — CLINICAL SUPPORT (OUTPATIENT)
Dept: REHABILITATION | Facility: HOSPITAL | Age: 64
End: 2019-01-14
Attending: SPECIALIST
Payer: COMMERCIAL

## 2019-01-14 DIAGNOSIS — F40.298 FEAR OF FALLING: ICD-10-CM

## 2019-01-14 DIAGNOSIS — Z74.09 IMPAIRED FUNCTIONAL MOBILITY, BALANCE, AND ENDURANCE: ICD-10-CM

## 2019-01-14 PROCEDURE — 97110 THERAPEUTIC EXERCISES: CPT | Mod: PO

## 2019-01-14 PROCEDURE — 97112 NEUROMUSCULAR REEDUCATION: CPT | Mod: PO

## 2019-01-14 NOTE — PROGRESS NOTES
"  Physical Therapy Daily Treatment Note     Name: Shira Vega  Clinic Number: [id number]    Therapy Diagnosis:   Encounter Diagnoses   Name Primary?    Impaired functional mobility, balance, and endurance     Fear of falling      Physician: ДМИТРИЙ Macdonald MD    Visit Date: 1/14/2019    Physician Orders: Eval and treat  Medical Diagnosis:   Dizziness and giddiness    Personal history of (healed) traumatic fracture    Personal history of other diseases of the circulatory system     Evaluation Date: 11/20/18  Authorization Period Expiration: 01/03/2019-01/03/2020  Plan of Care Certification Period: 11/20/18- 01/15/19  Extended POC Certification Period: 01/14/19- 03/11/19  Visit #/Visits authorized: 4/ 30 (4 total visits of 2019), (9 total visits in 2018)     Time In: 1430  Time Out: 1515  Total Billable Time: 45 minutes    Precautions: fall, visual deficits, standard, cognitive/emotional deficits    Subjective     Pt reports: The she is doing well today and is having an eye procedure on Thursday.  She was compliant with home exercise program.  Response to previous treatment: Tolerated well.  Functional change: Was able to walk up her front steps without holding onto a plant.    Pain: 0/10  Location: N/A    Objective     Patient received  Shira received therapeutic exercises to develop strength, endurance, ROM, flexibility, posture and core stabilization for 15 minutes including: therapy with activities as follows:                  X 10 min upright stationary bike L6 for CV endurance and B ankle ROM                  3 x 30" BLE GS stretch on incline with BUE support                   2 x 10, BLE heel raise with RLE eccentric lowering; BUE support              2 x 10, BLE heel raise with LLE eccentric lowering; BUE support      Patient participated in neuromuscular re-education activities to improve: Balance for 30 minutes. The following activities were included:       Evaluation 12/24/18 01/14/09   Single Limb " "Stance R LE 2 sec  (<10 sec = HIGH FALL RISK) 7 sec  (<10 sec = HIGH FALL RISK) 8 sec  (<10 sec = HIGH FALL RISK)    with CGA   Single Limb Stance L LE 1 sec  (<10 sec = HIGH FALL RISK) 2 sec  (<10 sec = HIGH FALL RISK) 3 sec   (<10 sec = HIGH FALL RISK)    with CGA   30 second Chair Rise 14 times s/ UE support- wide GAYATRI NT NT         JENNIFER SENSORY TESTING:  (P= Pass, F= Fail; note any sway; hold each position for 30")  Condition 1: (firm surface/feet together/eyes open) P  Condition 2: (firm surface/feet together/eyes closed) P   Condition 3: (firm surface/feet in tandem/eyes open) P, with CGA  Condition 4: (firm surface/feet in tandem/eyes closed) F, 10 sec with CGA   Condition 5: (soft surface/feet together/eyes open) P  Condition 6: (soft surface/feet together/eyes closed) F, 3 sec with CGA  Condition 7: (Fakuda step test), measure distance varied from center starting position NT     Functional Gait Assessment:   1. Gait on level surface =  3  2. Change in Gait Speed = 3  3. Gait with horizontal head turns  = 3  4. Gait with vertical head turns = 3  5. Gait with pivot turns = 2  6. Step over obstacle = 3  7. Gait with Narrow GAYATRI = 1  8. Gait with eyes closed = 2  9. Ambulating Backwards = 2  10. Steps = 2     Score  24/30       Home Exercises Provided and Patient Education Provided     Education provided: Continue current HEP. Pt also educated on the importance of calling eye doctor to make sure she is cleared to participate in PT on Friday.    Written Home Exercises Provided: Continue current HEP.  See EMR under Media for exercises provided prior.    Assessment     Pt reassessed for POC update. Shira continues to make good progress with therapy. Shira demonstrated an increase in BLE SLS reaching her short term goal. She continues to tolerate CV Endurance and strengthening activities well. Pt also demonstrated a 1 point increase in FGA score and improved scores on the GST. Pt was able to pass condition 3 of the " GST with CGA from PT. PT believes this is due to her anxiety of staying in a NBOS with no UE support. Pt was noticeably calmer with CGA from PT despite PT not helping pt with balance. Despite increased times recorded on GST conditions 4 and 6, pt continues to demonstrate difficulty with NBOS and EC activities. At this time pt would continue to benefit from skilled PT services to address the remaining balance and mobility deficits.     Shira is progressing well towards her goals.     Pt prognosis is Good.     Pt will continue to benefit from skilled outpatient physical therapy to address the deficits listed in the problem list box on initial evaluation, provide pt/family education and to maximize pt's level of independence in the home and community environment.     Pt's spiritual, cultural and educational needs considered and pt agreeable to plan of care and goals.    Anticipated barriers to physical therapy: cognitive/emotional deficits    GOALS:   Short term goals: 4 weeks, pt agrees to goals set.  1. Pt to be (I) with established HEP MET  2. Pt to improve L ankle IV and EV strength scores to at least 4+/5 for improved ankle strategy with balance. MET  3. Pt to improve BLE SLS scores to at least 3 sec consistently for improved safety with standing ADL MET  4. Pt to improve GST condition 2 to at least 30 seconds for improved balance and decreased fall risk MET  5. Pt to improve GST condition 3 to at least 15 seconds for improved balance and decreased fall risk. MET  6. Pt to improve GST condition 4 to at least 8 seconds for improved balance and decreased fall risk. MET  7. Pt to improve GST condition 6 to at least 10 seconds for improved balance and decreased fall risk. ON GOING  8. Pt to improve FGA score to at least 23/30 for improved balance and decreased fall risk MET     Long term goals: 8 weeks, pt agrees to goals set  9. Pt to improve BLE SLS scores to at least 6 sec consistently for improved safety with  standing ADL. ON GOING  10. Pt to improve GST condition 3 to at least 20 seconds for improved balance and decreased fall risk. MET  11. Pt to improve GST condition 4 to at least 12 seconds for improved balance and decreased fall risk. ON GOING  12. Pt to improve GST condition 6 to at least 20 seconds for improved balance and decreased fall risk. ON GOING  13. Pt to improve FGA score to at least 26/30 for improved balance and decreased fall risk. ON GOING        Plan     PT to extend POC x 8 more weeks to further address remaining mobility deficits. Pt to attend therapy 2 x weekly per Extended POC Certification Period: 01/14/19- 03/11/19. Treatment to include: pt education, HEP, therapeutic exercises, neuromuscular re-education/balance exercises, therapeutic activities, joint mobilizations, and modalities PRN, to work towards established goals. PT to continue working on activities targeting SLS, tandem stance, and stair climbing. Pt may be seen by PTA to carry out plan of care.    I certify that I was present in the room directing the student in service delivery and guiding them using my skilled judgment. As the co-signing therapist I have reviewed the students documentation and am responsible for the treatment, assessment, and plan.     Viridiana Matthew, PT     Patsy Garcia, SPT

## 2019-01-15 NOTE — PLAN OF CARE
"  Physical Therapy Daily Treatment Note     Name: Shira Vega  Clinic Number: [id number]    Therapy Diagnosis:   Encounter Diagnoses   Name Primary?    Impaired functional mobility, balance, and endurance     Fear of falling      Physician: ДМИТРИЙ Macdonald MD    Visit Date: 1/14/2019    Physician Orders: Eval and treat  Medical Diagnosis:   Dizziness and giddiness    Personal history of (healed) traumatic fracture    Personal history of other diseases of the circulatory system     Evaluation Date: 11/20/18  Authorization Period Expiration: 01/03/2019-01/03/2020  Plan of Care Certification Period: 11/20/18- 01/15/19  Extended POC Certification Period: 01/14/19- 03/11/19  Visit #/Visits authorized: 4/ 30 (4 total visits of 2019), (9 total visits in 2018)     Time In: 1430  Time Out: 1515  Total Billable Time: 45 minutes    Precautions: fall, visual deficits, standard, cognitive/emotional deficits    Subjective     Pt reports: The she is doing well today and is having an eye procedure on Thursday.  She was compliant with home exercise program.  Response to previous treatment: Tolerated well.  Functional change: Was able to walk up her front steps without holding onto a plant.    Pain: 0/10  Location: N/A    Objective     Patient received  Shira received therapeutic exercises to develop strength, endurance, ROM, flexibility, posture and core stabilization for 15 minutes including: therapy with activities as follows:                  X 10 min upright stationary bike L6 for CV endurance and B ankle ROM                  3 x 30" BLE GS stretch on incline with BUE support                   2 x 10, BLE heel raise with RLE eccentric lowering; BUE support              2 x 10, BLE heel raise with LLE eccentric lowering; BUE support      Patient participated in neuromuscular re-education activities to improve: Balance for 30 minutes. The following activities were included:       Evaluation 12/24/18 01/14/09   Single Limb " "Stance R LE 2 sec  (<10 sec = HIGH FALL RISK) 7 sec  (<10 sec = HIGH FALL RISK) 8 sec  (<10 sec = HIGH FALL RISK)    with CGA   Single Limb Stance L LE 1 sec  (<10 sec = HIGH FALL RISK) 2 sec  (<10 sec = HIGH FALL RISK) 3 sec   (<10 sec = HIGH FALL RISK)    with CGA   30 second Chair Rise 14 times s/ UE support- wide GAYATRI NT NT         JENNIFER SENSORY TESTING:  (P= Pass, F= Fail; note any sway; hold each position for 30")  Condition 1: (firm surface/feet together/eyes open) P  Condition 2: (firm surface/feet together/eyes closed) P   Condition 3: (firm surface/feet in tandem/eyes open) P, with CGA  Condition 4: (firm surface/feet in tandem/eyes closed) F, 10 sec with CGA   Condition 5: (soft surface/feet together/eyes open) P  Condition 6: (soft surface/feet together/eyes closed) F, 3 sec with CGA  Condition 7: (Fakuda step test), measure distance varied from center starting position NT     Functional Gait Assessment:   1. Gait on level surface =  3  2. Change in Gait Speed = 3  3. Gait with horizontal head turns  = 3  4. Gait with vertical head turns = 3  5. Gait with pivot turns = 2  6. Step over obstacle = 3  7. Gait with Narrow GAYATRI = 1  8. Gait with eyes closed = 2  9. Ambulating Backwards = 2  10. Steps = 2     Score  24/30       Home Exercises Provided and Patient Education Provided     Education provided: Continue current HEP. Pt also educated on the importance of calling eye doctor to make sure she is cleared to participate in PT on Friday.    Written Home Exercises Provided: Continue current HEP.  See EMR under Media for exercises provided prior.    Assessment     Pt reassessed for POC update. Shira continues to make good progress with therapy. Shira demonstrated an increase in BLE SLS reaching her short term goal. She continues to tolerate CV Endurance and strengthening activities well. Pt also demonstrated a 1 point increase in FGA score and improved scores on the GST. Pt was able to pass condition 3 of the " GST with CGA from PT. PT believes this is due to her anxiety of staying in a NBOS with no UE support. Pt was noticeably calmer with CGA from PT despite PT not helping pt with balance. Despite increased times recorded on GST conditions 4 and 6, pt continues to demonstrate difficulty with NBOS and EC activities. At this time pt would continue to benefit from skilled PT services to address the remaining balance and mobility deficits.     Shira is progressing well towards her goals.     Pt prognosis is Good.     Pt will continue to benefit from skilled outpatient physical therapy to address the deficits listed in the problem list box on initial evaluation, provide pt/family education and to maximize pt's level of independence in the home and community environment.     Pt's spiritual, cultural and educational needs considered and pt agreeable to plan of care and goals.    Anticipated barriers to physical therapy: cognitive/emotional deficits    GOALS:   Short term goals: 4 weeks, pt agrees to goals set.  1. Pt to be (I) with established HEP MET  2. Pt to improve L ankle IV and EV strength scores to at least 4+/5 for improved ankle strategy with balance. MET  3. Pt to improve BLE SLS scores to at least 3 sec consistently for improved safety with standing ADL MET  4. Pt to improve GST condition 2 to at least 30 seconds for improved balance and decreased fall risk MET  5. Pt to improve GST condition 3 to at least 15 seconds for improved balance and decreased fall risk. MET  6. Pt to improve GST condition 4 to at least 8 seconds for improved balance and decreased fall risk. MET  7. Pt to improve GST condition 6 to at least 10 seconds for improved balance and decreased fall risk. ON GOING  8. Pt to improve FGA score to at least 23/30 for improved balance and decreased fall risk MET     Long term goals: 8 weeks, pt agrees to goals set  9. Pt to improve BLE SLS scores to at least 6 sec consistently for improved safety with  standing ADL. ON GOING  10. Pt to improve GST condition 3 to at least 20 seconds for improved balance and decreased fall risk. MET  11. Pt to improve GST condition 4 to at least 12 seconds for improved balance and decreased fall risk. ON GOING  12. Pt to improve GST condition 6 to at least 20 seconds for improved balance and decreased fall risk. ON GOING  13. Pt to improve FGA score to at least 26/30 for improved balance and decreased fall risk. ON GOING        Plan     PT to extend POC x 8 more weeks to further address remaining mobility deficits. Pt to attend therapy 2 x weekly per Extended POC Certification Period: 01/14/19- 03/11/19. Treatment to include: pt education, HEP, therapeutic exercises, neuromuscular re-education/balance exercises, therapeutic activities, joint mobilizations, and modalities PRN, to work towards established goals. PT to continue working on activities targeting SLS, tandem stance, and stair climbing. Pt may be seen by PTA to carry out plan of care.    I certify that I was present in the room directing the student in service delivery and guiding them using my skilled judgment. As the co-signing therapist I have reviewed the students documentation and am responsible for the treatment, assessment, and plan.     Viridiana Matthew, PT     Patsy Garcia, SPT

## 2019-01-18 ENCOUNTER — CLINICAL SUPPORT (OUTPATIENT)
Dept: REHABILITATION | Facility: HOSPITAL | Age: 64
End: 2019-01-18
Attending: SPECIALIST
Payer: COMMERCIAL

## 2019-01-18 DIAGNOSIS — F40.298 FEAR OF FALLING: ICD-10-CM

## 2019-01-18 DIAGNOSIS — Z74.09 IMPAIRED FUNCTIONAL MOBILITY, BALANCE, AND ENDURANCE: ICD-10-CM

## 2019-01-18 PROCEDURE — 97110 THERAPEUTIC EXERCISES: CPT | Mod: PO

## 2019-01-18 PROCEDURE — 97112 NEUROMUSCULAR REEDUCATION: CPT | Mod: PO

## 2019-01-18 NOTE — PROGRESS NOTES
"  Physical Therapy Daily Treatment Note     Name: Shira Vega  Clinic Number: [id number]    Therapy Diagnosis:   Encounter Diagnoses   Name Primary?    Impaired functional mobility, balance, and endurance     Fear of falling      Physician: ДМИТРИЙ Macdonald MD    Visit Date: 1/18/2019    Physician Orders: Eval and treat  Medical Diagnosis:   Dizziness and giddiness    Personal history of (healed) traumatic fracture    Personal history of other diseases of the circulatory system     Evaluation Date: 11/20/18  Authorization Period Expiration: 01/03/2019-01/03/2020  Plan of Care Certification Period: 11/20/18- 01/15/19  Extended POC Certification Period: 01/14/19- 03/11/19  Visit #/Visits authorized: 5 / 30 (5 total visits of 2019), (9 total visits in 2018)     Time In: 14:30  Time Out: 15:15  Total Billable Time: 45 minutes    Precautions: fall, visual deficits, standard, cognitive/emotional deficits    Subjective     Pt reports: She is doing pretty good.  She was compliant with home exercise program.  Response to previous treatment: Tolerated well.  Functional change: Was able to walk up her front steps without holding onto anything. She is still descending her front steps holding onto a plant but lets go for the last step.    Pain: 0/10  Location: N/A    Objective     Patient received  Shira received therapeutic exercises to develop strength, endurance, ROM, flexibility, posture and core stabilization for 15 minutes including: therapy with activities as follows:                  X 10 min upright stationary bike L6 for CV endurance and B ankle ROM              3 x 30" BLE GS stretch on incline with BUE support              x 30, RLE SL heel raise; BUE support              x 30, LLE SL heel raise; BUE support     Patient participated in neuromuscular re-education activities to improve: Balance for 30 minutes. The following activities were included:      Firm ground in parallel bars: Instructions to pause and take a " deep breath when patient's anxiety increases and she starts rushing the exercise.    8 in block: x 15 step up (LLE) and over, CGA, no UE support    8 in block: x 15 step up (RLE) and over, CGA, no UE support     x 6 stepping up and over 4 point foam fitter      Stairs 1 x 4 steps of 5 inch height, R hand rail. reciprocal stepping pattern CGA.   Stairs 2 x 4 steps of 5 inch height 1 finger touch down assist. Increased anxiety and shakiness. CGA  Stairs 1 x 4 steps of 5 inch height, no UE touchdown support. Increased anxiety and shakiness. CGA reciprocal up and down  Stairs 6 x 4 steps of 5 inch height, no UE touchdown support. Increased anxiety and shakiness. CGA reciprocal up and step to down.       Parallel bars:              X 2 laps tandem walking, 1 finger touchdown assist, CGA   X 1 lap tandem walking with Bilateral HHA. HHA was for comfort and to help her feel safe. Pt much less anxious and steps with a smoother pattern.              X 3 laps tandem walking, no UE support. UE touch down assist for minor LOBs. Pt starts getting anxious, becomes rigid, and rushes the exercise with jerky movements.      Ballet Bars:   2 x10 RLE heel taps off 4 inch block, LUE support    2 x10 LLE heel taps off 4 inch block, RUE support    X 20 ft tandem walking on a line on the floor. LUE HHA with minimal pushing. Appropriate strategies used for retaining balanced upright position.      Home Exercises Provided and Patient Education Provided     Education provided: Continue current HEP. Pt also educated on the importance of calling eye doctor to make sure she is cleared to participate in PT on Friday.    Written Home Exercises Provided: Continue current HEP.  See EMR under Media for exercises provided prior.    Assessment     Shira continues to make good progress with therapy. She continues to tolerate CV Endurance and strengthening activities well. Pt is still noticeably calmer with HHA from PT despite PT not helping pt with  balance. PT is progressing exercises away from the parallel bars and ballet bar when appropriate to elicit use of appropriate balance strategies. Pt continues to progress appropriately with strengthening exercises. Pt to continue to benefit from skilled PT services to address the remaining balance and mobility deficits.     Shira is progressing well towards her goals.     Pt prognosis is Good.     Pt will continue to benefit from skilled outpatient physical therapy to address the deficits listed in the problem list box on initial evaluation, provide pt/family education and to maximize pt's level of independence in the home and community environment.     Pt's spiritual, cultural and educational needs considered and pt agreeable to plan of care and goals.    Anticipated barriers to physical therapy: cognitive/emotional deficits    GOALS:   Short term goals: 4 weeks, pt agrees to goals set.  1. Pt to be (I) with established HEP MET  2. Pt to improve L ankle IV and EV strength scores to at least 4+/5 for improved ankle strategy with balance. MET  3. Pt to improve BLE SLS scores to at least 3 sec consistently for improved safety with standing ADL MET  4. Pt to improve GST condition 2 to at least 30 seconds for improved balance and decreased fall risk MET  5. Pt to improve GST condition 3 to at least 15 seconds for improved balance and decreased fall risk. MET  6. Pt to improve GST condition 4 to at least 8 seconds for improved balance and decreased fall risk. MET  7. Pt to improve GST condition 6 to at least 10 seconds for improved balance and decreased fall risk. ON GOING  8. Pt to improve FGA score to at least 23/30 for improved balance and decreased fall risk MET     Long term goals: 8 weeks, pt agrees to goals set  9. Pt to improve BLE SLS scores to at least 6 sec consistently for improved safety with standing ADL. ON GOING  10. Pt to improve GST condition 3 to at least 20 seconds for improved balance and decreased  fall risk. MET  11. Pt to improve GST condition 4 to at least 12 seconds for improved balance and decreased fall risk. ON GOING  12. Pt to improve GST condition 6 to at least 20 seconds for improved balance and decreased fall risk. ON GOING  13. Pt to improve FGA score to at least 26/30 for improved balance and decreased fall risk. ON GOING        Plan     Plan for Next Session: Single leg balance, single leg strengthening, exercises requiring dynamic balance. Keep trying to progress out of the bars.    Pt to attend therapy 2 x weekly per Extended POC Certification Period: 01/14/19- 03/11/19. Treatment to include: pt education, HEP, therapeutic exercises, neuromuscular re-education/balance exercises, therapeutic activities, joint mobilizations, and modalities PRN, to work towards established goals. PT to continue working on activities targeting SLS, tandem stance, and stair climbing. Pt may be seen by PTA to carry out plan of care.    I certify that I was present in the room directing the student in service delivery and guiding them using my skilled judgment. As the co-signing therapist I have reviewed the students documentation and am responsible for the treatment, assessment, and plan.     Viridiana Matthew, PT     Patsy Garcia, SPT

## 2019-01-21 ENCOUNTER — CLINICAL SUPPORT (OUTPATIENT)
Dept: REHABILITATION | Facility: HOSPITAL | Age: 64
End: 2019-01-21
Attending: SPECIALIST
Payer: COMMERCIAL

## 2019-01-21 DIAGNOSIS — Z74.09 IMPAIRED FUNCTIONAL MOBILITY, BALANCE, AND ENDURANCE: ICD-10-CM

## 2019-01-21 DIAGNOSIS — F40.298 FEAR OF FALLING: ICD-10-CM

## 2019-01-21 PROCEDURE — 97112 NEUROMUSCULAR REEDUCATION: CPT | Mod: PO

## 2019-01-21 PROCEDURE — 97110 THERAPEUTIC EXERCISES: CPT | Mod: PO

## 2019-01-21 NOTE — PROGRESS NOTES
"  Physical Therapy Daily Treatment Note     Name: Shira Vega  Clinic Number: [id number]    Therapy Diagnosis:   Encounter Diagnoses   Name Primary?    Impaired functional mobility, balance, and endurance     Fear of falling      Physician: ДМИТРИЙ Macdonald MD    Visit Date: 1/21/2019    Physician Orders: Eval and treat  Medical Diagnosis:   Dizziness and giddiness    Personal history of (healed) traumatic fracture    Personal history of other diseases of the circulatory system     Evaluation Date: 11/20/18  Authorization Period Expiration: 01/03/2019-01/03/2020  Plan of Care Certification Period: 11/20/18- 01/15/19  Extended POC Certification Period: 01/14/19- 03/11/19  Visit #/Visits authorized: 6 / 30 (6 total visits of 2019), (9 total visits in 2018)     Time In: 14:30  Time Out: 15:15  Total Billable Time: 45 minutes    Precautions: fall, visual deficits, standard, cognitive/emotional deficits    Subjective     Pt reports: She is doing pretty good.No new complaints.  She was compliant with home exercise program.  Response to previous treatment: Tolerated well but was a little sore and a little wobbly at the end of session.  Functional change: Nothing new reported.    Pain: 0/10  Location: N/A    Objective     Patient received  Shira received therapeutic exercises to develop strength, endurance, ROM, flexibility, posture and core stabilization for 20 minutes including: therapy with activities as follows:                 x 10 min upright stationary bike L7 for CV endurance and B ankle ROM             3 x 30" BLE GS stretch on incline with BUE support             x 30, RLE SL heel raise; BUE support             x 30, LLE SL heel raise; BUE support     Stairs:   2 x15 RLE heel taps off bottom step, 5 inches, BUE support    2 x15 LLE heel taps off  bottom step, 5 inches, BUE support    Patient participated in neuromuscular re-education activities to improve: Balance for 25 minutes. The following activities were " "included:     Stairs: "stairs don't feel as scary as they used too."       Stairs 2 x 4 steps of 5 inch height, R hand rail. reciprocal stepping pattern, SBA. VCs for looking at feet.    Stairs 5 x 4 steps of 5 inch height 1 finger touch down assist. A mild increase in anxiety or shakiness, but overall much smoother than at last session.    Stairs 6 x 4 steps of 5 inch height, no UE touchdown support. Increased anxiety and shakiness. CGA reciprocal up and step to down.    Stairs 6 x 4 steps of 5 inch height, no UE touchdown support ascending, Bilateral HHA descending. Reciprocal gait pattern ascending and descending.   Stairs 5 x 4 steps of 5 inch height, no UE touchdown support ascending, 1 (L) HHA descending. Reciprocal gait pattern ascending and descending.   Stairs 5 x 4 steps of 5 inch height, no UE support. Reciprocal gait pattern ascending and descending. Trial 4 and 5: minimal shakiness and patient appeared much smoother.      2 X 20 ft tandem walking on a line on the floor. 1 UE HHA with minimal pushing. Appropriate strategies used for retaining balanced upright position. Pt demo'd increased unsteadiness and appeared anxious on final lap.      Home Exercises Provided and Patient Education Provided     Education provided: Continue current HEP. Pt also educated on the importance of calling eye doctor to make sure she is cleared to participate in PT on Friday.    Written Home Exercises Provided: Continue current HEP.  See EMR under Media for exercises provided prior.    Assessment     Shira continues to make good progress with therapy. She tolerated an increase in resistance on the upright bike to improve her CV Endurance. PT spent a great amount of time on the stairs gradually decreasing the UE support and progressing to SBA only. Pt tolerated this progression well and was able to verbalize when she needed a break to sit and calm down.  Pt stated that the stairs are not as scary as they used to be to her and " that she is feeling stronger. Pt continues to appear to have mild increases in anxiety resulting in unsteadiness. Pt continues to progress appropriately with strengthening exercises. Overall, she is demonstrating increased confidence with balance activities. Pt to continue to benefit from skilled PT services to address the remaining balance and mobility deficits.     Increased level on bike for CV endurance, verbalized need for break when getting anxious, much smoother on stairs, increased confidence    Shira is progressing well towards her goals.     Pt prognosis is Good.     Pt will continue to benefit from skilled outpatient physical therapy to address the deficits listed in the problem list box on initial evaluation, provide pt/family education and to maximize pt's level of independence in the home and community environment.     Pt's spiritual, cultural and educational needs considered and pt agreeable to plan of care and goals.    Anticipated barriers to physical therapy: cognitive/emotional deficits    GOALS:   Short term goals: 4 weeks, pt agrees to goals set.  1. Pt to be (I) with established HEP MET  2. Pt to improve L ankle IV and EV strength scores to at least 4+/5 for improved ankle strategy with balance. MET  3. Pt to improve BLE SLS scores to at least 3 sec consistently for improved safety with standing ADL MET  4. Pt to improve GST condition 2 to at least 30 seconds for improved balance and decreased fall risk MET  5. Pt to improve GST condition 3 to at least 15 seconds for improved balance and decreased fall risk. MET  6. Pt to improve GST condition 4 to at least 8 seconds for improved balance and decreased fall risk. MET  7. Pt to improve GST condition 6 to at least 10 seconds for improved balance and decreased fall risk. ON GOING  8. Pt to improve FGA score to at least 23/30 for improved balance and decreased fall risk MET     Long term goals: 8 weeks, pt agrees to goals set  9. Pt to improve BLE  SLS scores to at least 6 sec consistently for improved safety with standing ADL. ON GOING  10. Pt to improve GST condition 3 to at least 20 seconds for improved balance and decreased fall risk. MET  11. Pt to improve GST condition 4 to at least 12 seconds for improved balance and decreased fall risk. ON GOING  12. Pt to improve GST condition 6 to at least 20 seconds for improved balance and decreased fall risk. ON GOING  13. Pt to improve FGA score to at least 26/30 for improved balance and decreased fall risk. ON GOING        Plan     Plan for Next Session: Single leg balance, single leg strengthening, exercises requiring dynamic balance. Keep trying to progress balance activities away from parallel bars.    Pt to attend therapy 2 x weekly per Extended POC Certification Period: 01/14/19- 03/11/19. Treatment to include: pt education, HEP, therapeutic exercises, neuromuscular re-education/balance exercises, therapeutic activities, joint mobilizations, and modalities PRN, to work towards established goals. PT to continue working on activities targeting SLS, tandem stance, and stair climbing. Pt may be seen by PTA to carry out plan of care.    I certify that I was present in the room directing the student in service delivery and guiding them using my skilled judgment. As the co-signing therapist I have reviewed the students documentation and am responsible for the treatment, assessment, and plan.     Viridiana Matthew, PT     Patsy Garcia, SPT

## 2019-01-25 ENCOUNTER — CLINICAL SUPPORT (OUTPATIENT)
Dept: REHABILITATION | Facility: HOSPITAL | Age: 64
End: 2019-01-25
Attending: SPECIALIST
Payer: COMMERCIAL

## 2019-01-25 DIAGNOSIS — Z74.09 IMPAIRED FUNCTIONAL MOBILITY, BALANCE, AND ENDURANCE: ICD-10-CM

## 2019-01-25 DIAGNOSIS — F40.298 FEAR OF FALLING: ICD-10-CM

## 2019-01-25 PROCEDURE — 97110 THERAPEUTIC EXERCISES: CPT | Mod: PO

## 2019-01-25 PROCEDURE — 97112 NEUROMUSCULAR REEDUCATION: CPT | Mod: PO

## 2019-01-25 NOTE — PROGRESS NOTES
"  Physical Therapy Daily Treatment Note     Name: Shira Vega  Clinic Number: [id number]    Therapy Diagnosis:   Encounter Diagnoses   Name Primary?    Impaired functional mobility, balance, and endurance     Fear of falling      Physician: ДМИТРИЙ Macdonald MD    Visit Date: 1/25/2019    Physician Orders: Eval and treat  Medical Diagnosis:   Dizziness and giddiness    Personal history of (healed) traumatic fracture    Personal history of other diseases of the circulatory system     Evaluation Date: 11/20/18  Authorization Period Expiration: 01/03/2019-01/03/2020  Plan of Care Certification Period: 11/20/18- 01/15/19  Extended POC Certification Period: 01/14/19- 03/11/19  Visit #/Visits authorized: 7 / 30 (7 total visits of 2019), (9 total visits in 2018)     Time In: 14:30  Time Out: 15:15  Total Billable Time: 45 minutes    Precautions: fall, visual deficits, standard, cognitive/emotional deficits    Subjective     Pt reports: She is doing pretty good today. She attempted stairs outside of a friend's house with her friend present, but without HR and felt more confident in this situation than she had prior.   She was compliant with home exercise program.  Response to previous treatment: Tolerated well but was a little tired at end of therapy session.  Functional change: Patient was able to descend stairs more confidently with supervision from a friend without UE support.     Pain: 0/10  Location: N/A    Objective     Patient received  Shira received therapeutic exercises to develop strength, endurance, ROM, flexibility, posture and core stabilization for 20 minutes including: therapy with activities as follows:      x 10 min upright stationary bike L7 for CV endurance and B ankle ROM    3 x 30" BLE GS stretch on incline with BUE support  x 30, RLE SL heel raise; BUE support  x 30, LLE SL heel raise; BUE support       Patient participated in neuromuscular re-education activities to improve: Balance for 25 minutes. " "The following activities were included:     At ballet bar:    2 x 30",  Each LE, SLS with alternate LE on BOSU, soft side down, CGA   X 20 reps alternating toe tapping to 1 large cone in front, pt's hands placed on her waist, CGA, no UE support   X 10 reps, each LE, SLS with alternate LE tapping 2 medium cones placed in front, pt's hands on her waist, CGA to occ Min A, no UE support   X 10 reps each LE, SLS with alternate LE tapping numbered step (4" step, 6" step, and 8" step) when PT calls out number, CGA, no UE support     2 X 20 ft tandem walking on a line on the floor. 1 UE HHA with Min A to CGA. Appropriate strategies used for retaining balanced upright position. Pt demo'd increased unsteadiness and appeared anxious on final lap.      Home Exercises Provided and Patient Education Provided     Education provided: Continue current HEP. Pt also educated on the importance of calling eye doctor to make sure she is cleared to participate in PT on Friday.    Written Home Exercises Provided: Continue current HEP.  See EMR under Media for exercises provided prior.    Assessment   Shira tolerated her therapy session well this afternoon without complaint. Therapy session focused heavily on SLS and appropriate weight shifting. PT utilized grounding technique to help in which patient placed her hands on her hips while performing various balance activities to calm her anxious behavior when feeling unsteady. Pt responded positively to this technique and demonstrates improved control during single limb activities. Pt did require more HHA during tandem ambulation this date compared to prior session, but she was able to progress to less support as trial endured. Pt to continue to benefit from skilled PT services to address the remaining balance and mobility deficits. Cont. POC.     Shira is progressing well towards her goals.     Pt prognosis is Good.     Pt will continue to benefit from skilled outpatient physical therapy to " address the deficits listed in the problem list box on initial evaluation, provide pt/family education and to maximize pt's level of independence in the home and community environment.     Pt's spiritual, cultural and educational needs considered and pt agreeable to plan of care and goals.    Anticipated barriers to physical therapy: cognitive/emotional deficits    GOALS:   Short term goals: 4 weeks, pt agrees to goals set.  1. Pt to be (I) with established HEP MET  2. Pt to improve L ankle IV and EV strength scores to at least 4+/5 for improved ankle strategy with balance. MET  3. Pt to improve BLE SLS scores to at least 3 sec consistently for improved safety with standing ADL MET  4. Pt to improve GST condition 2 to at least 30 seconds for improved balance and decreased fall risk MET  5. Pt to improve GST condition 3 to at least 15 seconds for improved balance and decreased fall risk. MET  6. Pt to improve GST condition 4 to at least 8 seconds for improved balance and decreased fall risk. MET  7. Pt to improve GST condition 6 to at least 10 seconds for improved balance and decreased fall risk. ON GOING  8. Pt to improve FGA score to at least 23/30 for improved balance and decreased fall risk MET     Long term goals: 8 weeks, pt agrees to goals set  9. Pt to improve BLE SLS scores to at least 6 sec consistently for improved safety with standing ADL. ON GOING  10. Pt to improve GST condition 3 to at least 20 seconds for improved balance and decreased fall risk. MET  11. Pt to improve GST condition 4 to at least 12 seconds for improved balance and decreased fall risk. ON GOING  12. Pt to improve GST condition 6 to at least 20 seconds for improved balance and decreased fall risk. ON GOING  13. Pt to improve FGA score to at least 26/30 for improved balance and decreased fall risk. ON GOING        Plan     Plan for Next Session: Single leg balance, stair negotiation, single leg strengthening, exercises requiring dynamic  balance. Keep trying to progress balance activities away from parallel bars.    Pt to attend therapy 2 x weekly per Extended POC Certification Period: 01/14/19- 03/11/19. Treatment to include: pt education, HEP, therapeutic exercises, neuromuscular re-education/balance exercises, therapeutic activities, joint mobilizations, and modalities PRN, to work towards established goals. PT to continue working on activities targeting SLS, tandem stance, and stair climbing. Pt may be seen by PTA to carry out plan of care.    Viridiana Matthew, PT

## 2019-01-28 ENCOUNTER — CLINICAL SUPPORT (OUTPATIENT)
Dept: REHABILITATION | Facility: HOSPITAL | Age: 64
End: 2019-01-28
Attending: SPECIALIST
Payer: COMMERCIAL

## 2019-01-28 DIAGNOSIS — Z74.09 IMPAIRED FUNCTIONAL MOBILITY, BALANCE, AND ENDURANCE: ICD-10-CM

## 2019-01-28 DIAGNOSIS — F40.298 FEAR OF FALLING: ICD-10-CM

## 2019-01-28 PROCEDURE — 97110 THERAPEUTIC EXERCISES: CPT | Mod: PO

## 2019-01-28 PROCEDURE — 97112 NEUROMUSCULAR REEDUCATION: CPT | Mod: PO

## 2019-01-28 RX ORDER — LEVOTHYROXINE SODIUM 50 UG/1
TABLET ORAL
Qty: 90 TABLET | Refills: 2 | Status: SHIPPED | OUTPATIENT
Start: 2019-01-28 | End: 2019-10-25 | Stop reason: SDUPTHER

## 2019-01-28 NOTE — PROGRESS NOTES
"  Physical Therapy Daily Treatment Note     Name: Shira Vega  Clinic Number: [id number]    Therapy Diagnosis:   Encounter Diagnoses   Name Primary?    Impaired functional mobility, balance, and endurance     Fear of falling      Physician: ДМИТРИЙ Macdonald MD    Visit Date: 1/28/2019    Physician Orders: Eval and treat  Medical Diagnosis:   Dizziness and giddiness    Personal history of (healed) traumatic fracture    Personal history of other diseases of the circulatory system     Evaluation Date: 11/20/18  Authorization Period Expiration: 01/03/2019-01/03/2020  Plan of Care Certification Period: 11/20/18- 01/15/19  Extended POC Certification Period: 01/14/19- 03/11/19  Visit #/Visits authorized: 8 / 30 (8 total visits of 2019), (9 total visits in 2018)     Time In: 2:30  Time Out: 3:15  Total Billable Time: 45 minutes    Precautions: fall, visual deficits, standard, cognitive/emotional deficits    Subjective     Pt reports: She is feeling more confident about stair negotiation    She was compliant with home exercise program.  Response to previous treatment: Tolerated well but was a little tired at end of therapy session.  Functional change: Patient was able to descend stairs more confidently with supervision from a friend without UE support.     Pain: 0/10  Location: N/A    Objective     Patient received  Shira received therapeutic exercises to develop strength, endurance, ROM, flexibility, posture and core stabilization for 15 minutes including: therapy with activities as follows:      x 10 min upright stationary bike L7 for CV endurance and B ankle ROM    3 x 30" BLE GS stretch on incline with BUE support  x 30, RLE SL heel raise; BUE support  x 30, LLE SL heel raise; BUE support       Patient participated in neuromuscular re-education activities to improve: Balance for 30 minutes. The following activities were included:     //Bars:   2 x 30",  Each LE, SLS with alternate LE on BOSU, soft side down, CGA   X " "20 reps alternating toe tapping to 1 large cone in front, pt's hands placed on her waist, CGA, no UE support   X 10 reps, each LE, SLS with alternate LE tapping 2 medium cones placed in front, pt's hands on her waist, CGA to occ Min A, no UE support   X 10 reps each LE, SLS with alternate LE tapping numbered step (4" step, 6" step)               X 30 reps step down fwd up bkwd from/to 4" step 1 progressing to no UE sup              X 3 laps tandem walk intermittent UE sup              2x1' of SLS R and L with simultaneous ball self catch and toss    On Foam in //Bars              2x10 reps horizontal head turns EO no UE sup              2x10 reps vertical head turns EO no UE sup              2x30 sec EC static    Ambulated: Up down 4 step 5 trials              Ascend SBA no UE sup              Descend CGA/MIN A               Home Exercises Provided and Patient Education Provided     Education provided: Continue current HEP. Pt also educated on the importance of calling eye doctor to make sure she is cleared to participate in PT on Friday.    Written Home Exercises Provided: Continue current HEP.  See EMR under Media for exercises provided prior.    Assessment   Shira tolerated her therapy session well this afternoon without complaint. Remains very tentative and hesitant without UE sup especially stepping down. Improved descent following numerous trials. Cont. POC.     Shira is progressing well towards her goals.     Pt prognosis is Good.     Pt will continue to benefit from skilled outpatient physical therapy to address the deficits listed in the problem list box on initial evaluation, provide pt/family education and to maximize pt's level of independence in the home and community environment.     Pt's spiritual, cultural and educational needs considered and pt agreeable to plan of care and goals.    Anticipated barriers to physical therapy: cognitive/emotional deficits    GOALS:   Short term goals: 4 weeks, pt " agrees to goals set.  1. Pt to be (I) with established HEP MET  2. Pt to improve L ankle IV and EV strength scores to at least 4+/5 for improved ankle strategy with balance. MET  3. Pt to improve BLE SLS scores to at least 3 sec consistently for improved safety with standing ADL MET  4. Pt to improve GST condition 2 to at least 30 seconds for improved balance and decreased fall risk MET  5. Pt to improve GST condition 3 to at least 15 seconds for improved balance and decreased fall risk. MET  6. Pt to improve GST condition 4 to at least 8 seconds for improved balance and decreased fall risk. MET  7. Pt to improve GST condition 6 to at least 10 seconds for improved balance and decreased fall risk. ON GOING  8. Pt to improve FGA score to at least 23/30 for improved balance and decreased fall risk MET     Long term goals: 8 weeks, pt agrees to goals set  9. Pt to improve BLE SLS scores to at least 6 sec consistently for improved safety with standing ADL. ON GOING  10. Pt to improve GST condition 3 to at least 20 seconds for improved balance and decreased fall risk. MET  11. Pt to improve GST condition 4 to at least 12 seconds for improved balance and decreased fall risk. ON GOING  12. Pt to improve GST condition 6 to at least 20 seconds for improved balance and decreased fall risk. ON GOING  13. Pt to improve FGA score to at least 26/30 for improved balance and decreased fall risk. ON GOING        Plan     Plan for Next Session: Single leg balance, stair negotiation, single leg strengthening, exercises requiring dynamic balance. Keep trying to progress balance activities away from parallel bars.    Pt to attend therapy 2 x weekly per Extended POC Certification Period: 01/14/19- 03/11/19. Treatment to include: pt education, HEP, therapeutic exercises, neuromuscular re-education/balance exercises, therapeutic activities, joint mobilizations, and modalities PRN, to work towards established goals. PT to continue working on  activities targeting SLS, tandem stance, and stair climbing. Pt may be seen by PTA to carry out plan of care.    Logan Smith, PTA

## 2019-01-31 NOTE — PROGRESS NOTES
"  Physical Therapy Daily Treatment Note     Name: Shira Vega  Clinic Number: [id number]    Therapy Diagnosis:   Encounter Diagnoses   Name Primary?    Impaired functional mobility, balance, and endurance     Fear of falling      Physician: ДМИТРИЙ Macdonald MD    Visit Date: 2/1/2019    Physician Orders: Eval and treat  Medical Diagnosis:   Dizziness and giddiness    Personal history of (healed) traumatic fracture    Personal history of other diseases of the circulatory system     Evaluation Date: 11/20/18  Authorization Period Expiration: 01/03/2019-01/03/2020  Plan of Care Certification Period: 11/20/18- 01/15/19  Extended POC Certification Period: 01/14/19- 03/11/19  Visit #/Visits authorized: 9 / 30 (9 total visits of 2019), (9 total visits in 2018)    Time In: 14:30  Time Out: 15:15  Total Billable Time: 45 minutes    Precautions: fall, visual deficits, standard, cognitive/emotional deficits    Subjective     Pt reports: Doing okay and she says her balance is feeling better. Doing reciprocal pattern ascending stairs with UE support and step-to pattern descending stairs. Went to the gym on Wednesday for bike riding and UE weight lifting.    She was compliant with home exercise program.  Response to previous treatment: Tolerated well but was a little tired at end of therapy session.  Functional change: No new reports.     Pain: 0/10  Location: N/A    Objective     Patient received  Shira received therapeutic exercises to develop strength, endurance, ROM, flexibility, posture and core stabilization for 20 minutes including: therapy with activities as follows:                 x 10 min treadmill speed 1.1 mph for CV endurance and B ankle ROM, normal GAYATRI maintained             3 x 30" BLE GS stretch on incline with BUE support             x 30, RLE SL heel raise; BUE support             x 30, LLE SL heel raise; BUE support     Stairs:              3 x 10 RLE heel taps off bottom step, 5 inches, BUE support         "      3 x 10 LLE heel taps off  bottom step, 5 inches, BUE support     Patient participated in neuromuscular re-education activities to improve: Balance for 25 minutes. The following activities were included:     Before performing stairs, pt was reminded to take a deep breath and remain calm. Pt was also reminded to inform the PT when a rest break was required for either legs feeling weak or anxiousness.    Wooden Therapy Stairs:                          Stairs 5 x 4 steps of 5 inch height, R hand rail. reciprocal stepping pattern, SBA. VCs for looking at feet.              Stairs 6 x 4 steps of 5 inch height 1 finger touch down assist. A mild increase in anxiety or shakiness, but overall much smoother than at last session. Occasional step to on third step.              Stairs 5 x 4 steps of 5 inch height, Bilateral HHA with descending. Reciprocal ascending and step to descending.    Stairs 5 x 4 steps of 5 inch height, no UE touchdown support ascending, Reciprocal gait pattern ascending and step to descending.              Stairs 4 x 4 steps of 5 inch height, no UE touchdown support ascending, 1 (L) HHA descending. Reciprocal gait pattern ascending and descending. Increased anxiety and shakiness.               Wooden Therapy Stairs (~5 minutes): Ascending and descending stairs with reciprocal gait pattern and no UE support. Pt did use 1 UE support for turning before descending. Pt ascended and descended 2 steps multiple times before adding in a third step. Once patient felt comfortable and performed all 3 steps smoothly and with good control, the fourth step was added in. PT was SBA for whole duration.      Home Exercises Provided and Patient Education Provided     Education provided: Continue current HEP. Pt also educated on the importance of calling eye doctor to make sure she is cleared to participate in PT on Friday.    Written Home Exercises Provided: Continue current HEP.  See EMR under Media for exercises  provided prior.    Assessment   Shira tolerated her therapy session well this afternoon without complaint. While ambulating on the treadmill pt demonstrated a normal GAYATRI for full 10 minute duration. Will increase speed as per patient toleration in future session while trying to maintain a normal gait pattern. When ascending/descending stairs, quality improved after numerous trials and gradually progressing task difficulty. Cont. POC to further address remaining mobility deficits.     Shira is progressing well towards her goals.     Pt prognosis is Good.     Pt will continue to benefit from skilled outpatient physical therapy to address the deficits listed in the problem list box on initial evaluation, provide pt/family education and to maximize pt's level of independence in the home and community environment.     Pt's spiritual, cultural and educational needs considered and pt agreeable to plan of care and goals.    Anticipated barriers to physical therapy: cognitive/emotional deficits    GOALS:   Short term goals: 4 weeks, pt agrees to goals set.  1. Pt to be (I) with established HEP MET  2. Pt to improve L ankle IV and EV strength scores to at least 4+/5 for improved ankle strategy with balance. MET  3. Pt to improve BLE SLS scores to at least 3 sec consistently for improved safety with standing ADL MET  4. Pt to improve GST condition 2 to at least 30 seconds for improved balance and decreased fall risk MET  5. Pt to improve GST condition 3 to at least 15 seconds for improved balance and decreased fall risk. MET  6. Pt to improve GST condition 4 to at least 8 seconds for improved balance and decreased fall risk. MET  7. Pt to improve GST condition 6 to at least 10 seconds for improved balance and decreased fall risk. ON GOING  8. Pt to improve FGA score to at least 23/30 for improved balance and decreased fall risk MET     Long term goals: 8 weeks, pt agrees to goals set  9. Pt to improve BLE SLS scores to at  least 6 sec consistently for improved safety with standing ADL. ON GOING  10. Pt to improve GST condition 3 to at least 20 seconds for improved balance and decreased fall risk. MET  11. Pt to improve GST condition 4 to at least 12 seconds for improved balance and decreased fall risk. ON GOING  12. Pt to improve GST condition 6 to at least 20 seconds for improved balance and decreased fall risk. ON GOING  13. Pt to improve FGA score to at least 26/30 for improved balance and decreased fall risk. ON GOING        Plan     Plan for Next Session:   - Stair negotiation with reciprocal gait pattern ascending and descending. Start with 2 steps and instruct patient to add a step as she feels comfortable with it. Progress to all 4 steps being performed smoothly.    - Single leg balance, single leg strengthening, exercises requiring dynamic balance.   - Keep trying to progress balance activities away from parallel bars.  - Remind patient to inform therapist when she requires a rest break either from fatigue or when she needs a moment to calm down.    Pt to attend therapy 2 x weekly per Extended POC Certification Period: 01/14/19- 03/11/19. Treatment to include: pt education, HEP, therapeutic exercises, neuromuscular re-education/balance exercises, therapeutic activities, joint mobilizations, and modalities PRN, to work towards established goals. PT to continue working on activities targeting SLS, tandem stance, and stair climbing. Pt may be seen by PTA to carry out plan of care.    Patsy Garcia, SPT    I certify that I was present in the room directing the student in service delivery and guiding them using my skilled judgment. As the co-signing therapist I have reviewed the students documentation and am responsible for the treatment, assessment, and plan.     Viridiana Matthew, PT

## 2019-02-01 ENCOUNTER — CLINICAL SUPPORT (OUTPATIENT)
Dept: REHABILITATION | Facility: HOSPITAL | Age: 64
End: 2019-02-01
Attending: SPECIALIST
Payer: COMMERCIAL

## 2019-02-01 DIAGNOSIS — F40.298 FEAR OF FALLING: ICD-10-CM

## 2019-02-01 DIAGNOSIS — Z74.09 IMPAIRED FUNCTIONAL MOBILITY, BALANCE, AND ENDURANCE: ICD-10-CM

## 2019-02-01 PROCEDURE — 97112 NEUROMUSCULAR REEDUCATION: CPT | Mod: PO

## 2019-02-01 PROCEDURE — 97110 THERAPEUTIC EXERCISES: CPT | Mod: PO

## 2019-02-04 ENCOUNTER — DOCUMENTATION ONLY (OUTPATIENT)
Dept: REHABILITATION | Facility: HOSPITAL | Age: 64
End: 2019-02-04

## 2019-02-04 ENCOUNTER — CLINICAL SUPPORT (OUTPATIENT)
Dept: REHABILITATION | Facility: HOSPITAL | Age: 64
End: 2019-02-04
Attending: SPECIALIST
Payer: COMMERCIAL

## 2019-02-04 DIAGNOSIS — Z74.09 IMPAIRED FUNCTIONAL MOBILITY, BALANCE, AND ENDURANCE: ICD-10-CM

## 2019-02-04 DIAGNOSIS — F40.298 FEAR OF FALLING: ICD-10-CM

## 2019-02-04 PROCEDURE — 97110 THERAPEUTIC EXERCISES: CPT | Mod: PO

## 2019-02-04 PROCEDURE — 97112 NEUROMUSCULAR REEDUCATION: CPT | Mod: PO

## 2019-02-04 NOTE — PROGRESS NOTES
"  Physical Therapy Daily Treatment Note     Name: Shira Vega  Clinic Number: [id number]    Therapy Diagnosis:   Encounter Diagnoses   Name Primary?    Impaired functional mobility, balance, and endurance     Fear of falling      Physician: ДМИТРИЙ Macdonald MD    Visit Date: 2/4/2019    Physician Orders: Eval and treat  Medical Diagnosis:   Dizziness and giddiness    Personal history of (healed) traumatic fracture    Personal history of other diseases of the circulatory system     Evaluation Date: 11/20/18  Authorization Period Expiration: 01/03/2019-01/03/2020  Plan of Care Certification Period: 11/20/18- 01/15/19  Extended POC Certification Period: 01/14/19- 03/11/19  Visit #/Visits authorized: 10 / 30 (10 total visits of 2019), (9 total visits in 2018)    Time In: 2;30  Time Out: 3:15  Total Billable Time: 45 minutes     Precautions: fall, visual deficits, standard, cognitive/emotional deficits    Subjective     Pt reports: went to the gym last week. Does her HEP    She was compliant with home exercise program.  Response to previous treatment: good  Functional change: No new reports.     Pain: 0/10  Location: N/A    Objective     Patient received  Shira received therapeutic exercises to develop strength, endurance, ROM, flexibility, posture and core stabilization for 20 minutes including: therapy with activities as follows:                 x 10 min treadmill speed 1.1 mph for CV endurance and B ankle ROM, d (Not performed today)             3 x 30" BLE GS stretch on incline with BUE support             x 30, RLE SL heel raise; BUE support             x 30, LLE SL heel raise; BUE support  Patient participated in neuromuscular re-education activities to improve: Balance for 25 minutes. The following activities were include  - Stair negotiation with reciprocal gait pattern ascending and descending. Start with 2 steps and instruct patient to add a step as she feels comfortable with it. Progress to all 4 steps " "being performed smoothly.Out of time    - Single leg balance,w/contralateral foot on small bolster static initially 3 x 10-25 sec no UE sup out of //Bars  -SLS w/contralateral foot on small bolster AP and lateral rolls 15-20 sec no UE sup out on //Bars  -Stepping up  and over various step height 6", 3", 2"           (Not performed this visit)  Wooden Therapy Stairs:                          Stairs 5 x 4 steps of 5 inch height, R hand rail. reciprocal stepping pattern, SBA. VCs for looking at feet.              Stairs 6 x 4 steps of 5 inch height 1 finger touch down assist. A mild increase in anxiety or shakiness, but overall much smoother than at last session. Occasional step to on third step.              Stairs 5 x 4 steps of 5 inch height, Bilateral HHA with descending. Reciprocal ascending and step to descending.    Stairs 5 x 4 steps of 5 inch height, no UE touchdown support ascending, Reciprocal gait pattern ascending and step to descending.              Stairs 4 x 4 steps of 5 inch height, no UE touchdown support ascending, 1 (L) HHA descending. Reciprocal gait pattern ascending and descending. Increased anxiety and shakiness.               Wooden Therapy Stairs (~5 minutes): Ascending and descending stairs with reciprocal gait pattern and no UE support. Pt did use 1 UE support for turning before descending. Pt ascended and descended 2 steps multiple times before adding in a third step. Once patient felt comfortable and performed all 3 steps smoothly and with good control, the fourth step was added in. PT was SBA for whole duration.      Home Exercises Provided and Patient Education Provided     Education provided: Continue current HEP. Pt also educated on the importance of calling eye doctor to make sure she is cleared to participate in PT on Friday.    Written Home Exercises Provided: Continue current HEP.  See EMR under Media for exercises provided prior.    Assessment   Shira tolerated her therapy session " well today.Cont. POC to further address remaining mobility deficits. Addressed with new activities.    Shira is progressing well towards her goals.     Pt prognosis is Good.     Pt will continue to benefit from skilled outpatient physical therapy to address the deficits listed in the problem list box on initial evaluation, provide pt/family education and to maximize pt's level of independence in the home and community environment.     Pt's spiritual, cultural and educational needs considered and pt agreeable to plan of care and goals.    Anticipated barriers to physical therapy: cognitive/emotional deficits    GOALS:   Short term goals: 4 weeks, pt agrees to goals set.  1. Pt to be (I) with established HEP MET  2. Pt to improve L ankle IV and EV strength scores to at least 4+/5 for improved ankle strategy with balance. MET  3. Pt to improve BLE SLS scores to at least 3 sec consistently for improved safety with standing ADL MET  4. Pt to improve GST condition 2 to at least 30 seconds for improved balance and decreased fall risk MET  5. Pt to improve GST condition 3 to at least 15 seconds for improved balance and decreased fall risk. MET  6. Pt to improve GST condition 4 to at least 8 seconds for improved balance and decreased fall risk. MET  7. Pt to improve GST condition 6 to at least 10 seconds for improved balance and decreased fall risk. ON GOING  8. Pt to improve FGA score to at least 23/30 for improved balance and decreased fall risk MET     Long term goals: 8 weeks, pt agrees to goals set  9. Pt to improve BLE SLS scores to at least 6 sec consistently for improved safety with standing ADL. ON GOING  10. Pt to improve GST condition 3 to at least 20 seconds for improved balance and decreased fall risk. MET  11. Pt to improve GST condition 4 to at least 12 seconds for improved balance and decreased fall risk. ON GOING  12. Pt to improve GST condition 6 to at least 20 seconds for improved balance and decreased  fall risk. ON GOING  13. Pt to improve FGA score to at least 26/30 for improved balance and decreased fall risk. ON GOING        Plan     Plan for Next Session:   - Stair negotiation with reciprocal gait pattern ascending and descending. Start with 2 steps and instruct patient to add a step as she feels comfortable with it. Progress to all 4 steps being performed smoothly.    - Single leg balance, single leg strengthening, exercises requiring dynamic balance.   - Keep trying to progress balance activities away from parallel bars.  - Remind patient to inform therapist when she requires a rest break either from fatigue or when she needs a moment to calm down.      Logan Smith, PTA

## 2019-02-05 ENCOUNTER — OFFICE VISIT (OUTPATIENT)
Dept: OTOLARYNGOLOGY | Facility: CLINIC | Age: 64
End: 2019-02-05
Payer: COMMERCIAL

## 2019-02-05 VITALS
BODY MASS INDEX: 22.5 KG/M2 | TEMPERATURE: 98 F | DIASTOLIC BLOOD PRESSURE: 74 MMHG | HEART RATE: 53 BPM | WEIGHT: 127 LBS | SYSTOLIC BLOOD PRESSURE: 131 MMHG | HEIGHT: 63 IN

## 2019-02-05 DIAGNOSIS — K21.9 LARYNGOPHARYNGEAL REFLUX (LPR): ICD-10-CM

## 2019-02-05 DIAGNOSIS — J34.2 NASAL SEPTAL DEVIATION: ICD-10-CM

## 2019-02-05 DIAGNOSIS — J30.89 NON-SEASONAL ALLERGIC RHINITIS, UNSPECIFIED TRIGGER: ICD-10-CM

## 2019-02-05 DIAGNOSIS — R13.10 DYSPHAGIA, UNSPECIFIED TYPE: Primary | ICD-10-CM

## 2019-02-05 DIAGNOSIS — Z74.09 IMPAIRED FUNCTIONAL MOBILITY, BALANCE, AND ENDURANCE: ICD-10-CM

## 2019-02-05 DIAGNOSIS — J30.0 VASOMOTOR RHINITIS: ICD-10-CM

## 2019-02-05 DIAGNOSIS — R09.89 CHRONIC THROAT CLEARING: ICD-10-CM

## 2019-02-05 PROCEDURE — 3078F PR MOST RECENT DIASTOLIC BLOOD PRESSURE < 80 MM HG: ICD-10-PCS | Mod: CPTII,S$GLB,, | Performed by: SPECIALIST

## 2019-02-05 PROCEDURE — 3075F PR MOST RECENT SYSTOLIC BLOOD PRESS GE 130-139MM HG: ICD-10-PCS | Mod: CPTII,S$GLB,, | Performed by: SPECIALIST

## 2019-02-05 PROCEDURE — 3075F SYST BP GE 130 - 139MM HG: CPT | Mod: CPTII,S$GLB,, | Performed by: SPECIALIST

## 2019-02-05 PROCEDURE — 99214 PR OFFICE/OUTPT VISIT, EST, LEVL IV, 30-39 MIN: ICD-10-PCS | Mod: 25,S$GLB,, | Performed by: SPECIALIST

## 2019-02-05 PROCEDURE — 1101F PR PT FALLS ASSESS DOC 0-1 FALLS W/OUT INJ PAST YR: ICD-10-PCS | Mod: CPTII,S$GLB,, | Performed by: SPECIALIST

## 2019-02-05 PROCEDURE — 1101F PT FALLS ASSESS-DOCD LE1/YR: CPT | Mod: CPTII,S$GLB,, | Performed by: SPECIALIST

## 2019-02-05 PROCEDURE — 31575 DIAGNOSTIC LARYNGOSCOPY: CPT | Mod: S$GLB,,, | Performed by: SPECIALIST

## 2019-02-05 PROCEDURE — 99214 OFFICE O/P EST MOD 30 MIN: CPT | Mod: 25,S$GLB,, | Performed by: SPECIALIST

## 2019-02-05 PROCEDURE — 3008F PR BODY MASS INDEX (BMI) DOCUMENTED: ICD-10-PCS | Mod: CPTII,S$GLB,, | Performed by: SPECIALIST

## 2019-02-05 PROCEDURE — 31575 LARYNGOSCOPY: ICD-10-PCS | Mod: S$GLB,,, | Performed by: SPECIALIST

## 2019-02-05 PROCEDURE — 3008F BODY MASS INDEX DOCD: CPT | Mod: CPTII,S$GLB,, | Performed by: SPECIALIST

## 2019-02-05 PROCEDURE — 3078F DIAST BP <80 MM HG: CPT | Mod: CPTII,S$GLB,, | Performed by: SPECIALIST

## 2019-02-05 RX ORDER — PNEUMOCOCCAL 13-VALENT CONJUGATE VACCINE 2.2; 2.2; 2.2; 2.2; 2.2; 4.4; 2.2; 2.2; 2.2; 2.2; 2.2; 2.2; 2.2 UG/.5ML; UG/.5ML; UG/.5ML; UG/.5ML; UG/.5ML; UG/.5ML; UG/.5ML; UG/.5ML; UG/.5ML; UG/.5ML; UG/.5ML; UG/.5ML; UG/.5ML
INJECTION, SUSPENSION INTRAMUSCULAR
Refills: 0 | COMMUNITY
Start: 2018-12-01 | End: 2020-04-10 | Stop reason: ALTCHOICE

## 2019-02-05 NOTE — PROGRESS NOTES
Subjective:       Patient ID: Shira Vega is a 63 y.o. female.    Chief Complaint: Follow-up and Sinus Problem    The patient is returning for follow-up visit.  Since her last visit she has had bilateral cataract extraction.  She now has to wear reading glasses.  She is undergoing vestibular rehabilitative therapy, which she feel is being very helpful.  She is able to balance much better now.  Her biggest problem is walking down steps.  With regard to her laryngopharyngeal reflux she is using Zantac twice daily.  She does have some throat clearing and phlegm in her throat and feels some swelling in her throat, but has improved significantly over the last several months.      Review of Systems   Constitutional: Positive for fatigue. Negative for activity change, appetite change, chills, fever and unexpected weight change.   HENT: Positive for congestion, postnasal drip, rhinorrhea, sore throat and trouble swallowing. Negative for ear discharge, ear pain, facial swelling, hearing loss, mouth sores, sinus pressure, sinus pain, sneezing, tinnitus and voice change.    Eyes: Negative for photophobia, pain, discharge, redness, itching and visual disturbance.   Respiratory: Positive for cough. Negative for apnea, choking, shortness of breath and wheezing.    Cardiovascular: Negative for chest pain and palpitations.   Gastrointestinal: Negative for abdominal distention, abdominal pain, nausea and vomiting.   Musculoskeletal: Positive for gait problem. Negative for arthralgias, myalgias, neck pain and neck stiffness.   Skin: Negative.  Negative for color change, pallor and rash.   Allergic/Immunologic: Negative for environmental allergies, food allergies and immunocompromised state.   Neurological: Positive for dizziness, light-headedness and headaches. Negative for facial asymmetry, speech difficulty, weakness and numbness.   Hematological: Negative for adenopathy. Does not bruise/bleed easily.   Psychiatric/Behavioral:  Negative for confusion, decreased concentration and sleep disturbance.       Objective:      Physical Exam   Constitutional: She is oriented to person, place, and time. She appears well-developed and well-nourished. She is cooperative.   HENT:   Head: Normocephalic.   Right Ear: External ear and ear canal normal. Tympanic membrane is retracted.   Left Ear: External ear and ear canal normal. Tympanic membrane is retracted.   Nose: Mucosal edema (cyanotic, boggy inferior turbinates bilaterally), rhinorrhea (clear mucus bilaterally) and septal deviation (To the left) present.   Mouth/Throat: Uvula is midline, oropharynx is clear and moist and mucous membranes are normal. No oral lesions.   Eyes: EOM and lids are normal. Pupils are equal, round, and reactive to light. Right eye exhibits no discharge and no exudate. Left eye exhibits no discharge and no exudate. Right conjunctiva is injected. Left conjunctiva is injected.   Neck: Trachea normal and normal range of motion. No muscular tenderness present. No tracheal deviation present. No thyroid mass and no thyromegaly present.   Cardiovascular: Normal rate, regular rhythm, normal heart sounds and normal pulses.   Pulmonary/Chest: Effort normal. No stridor. She has decreased breath sounds. She has no wheezes. She has no rhonchi. She has no rales.   Abdominal: Soft. Bowel sounds are normal. There is no tenderness.   Musculoskeletal: Normal range of motion.   Lymphadenopathy:        Head (right side): No submental, no submandibular, no preauricular, no posterior auricular and no occipital adenopathy present.        Head (left side): No submental, no submandibular, no preauricular, no posterior auricular and no occipital adenopathy present.     She has no cervical adenopathy.   Neurological: She is alert and oriented to person, place, and time. She has normal strength. No cranial nerve deficit or sensory deficit. Gait normal.   Neuro otologic:  No nystagmus, wide-based gait,  significantly diminished ataxia when compared to previous examinations   Skin: Skin is warm and dry. No petechiae and no rash noted. No cyanosis. Nails show no clubbing.   Psychiatric: She has a normal mood and affect. Her speech is normal and behavior is normal. Judgment and thought content normal. Cognition and memory are normal.       Flexible video nasolaryngoscopy-septum deviated to the right, inferior turbinates enlarged bilaterally and clear mucus in the nasal passages bilaterally; erythema and edema of the arytenoids and interarytenoid mucosa has improved significantly since last endoscopy of August 2018    Assessment:       1. Dysphagia, unspecified type    2. Laryngopharyngeal reflux (LPR)    3. Impaired functional mobility, balance, and endurance    4. Chronic throat clearing    5. Vasomotor rhinitis    6. Nasal septal deviation        Plan:       The vestibular rehabilitative therapy is helping the patient a great deal.  Because of her history of severe head injury it is not unlikely that she will require a prolongation of the therapy.  She is better today than I have seen her.  The Zantac does show improvement in findings endoscopically, so I will have her continue with twice daily.  I will recheck her in 2 months, or sooner on an as-needed basis.

## 2019-02-05 NOTE — PROCEDURES
"Laryngoscopy  Date/Time: 2/5/2019 9:37 AM  Performed by: ДМИТРИЙ Macdonald MD  Authorized by: ДМИТРИЙ Macdonald MD     Time out: Immediately prior to procedure a "time out" was called to verify the correct patient, procedure, equipment, support staff and site/side marked as required.    Consent Done?:  Yes (Verbal)  Anesthesia:     Local anesthetic:  Lidocaine 2% and Juan-Synephrine 1/2% (Topical aerosol)    Patient tolerance:  Patient tolerated the procedure well with no immediate complications    Decongestion performed?: Yes    Laryngoscopy:     Areas examined:  Nasal cavities, nasopharynx, oropharynx, hypopharynx, larynx and vocal cords    Laryngoscope size:  4 mm (Flexible video nasolaryngoscopy)  Nose External:      No external nasal deformity  Nose Intranasal:      Mucosa no polyps     Mucosa ulcers not present     No mucosa lesions present (Clear mucus bilaterally)     Septum gross deformity ( septum deviated to the left)     Enlarged turbinates ( inferior turbinates enlarged bilaterally)  Nasopharynx:      No mucosa lesions     Adenoids not present     Posterior choanae patent     Eustachian tube patent  Larynx/hypopharynx:      No epiglottis lesions     No epiglottis edema     No AE folds lesions     No vocal cord polyps     Equal and normal bilateral ( vocal cords move symmetrically, no mucosal lesions noted)     No hypopharynx lesions     No piriform sinus pooling     No piriform sinus lesions     Post cricoid edema (Minimal edema of the arytenoids and moderate edema of the interarytenoid mucosa, significantly improved from last endoscopy)     Post cricoid erythema ( mild erythema of the interarytenoid mucosa only, significantly improved from last endoscopy)     Flexible video nasolaryngoscopy reveals nasal septal deviation inferior turbinate hypertrophy and changes of allergic rhinitis; posterior supraglottic laryngeal changes show significant improvement from last endoscopy done in August 2018          "

## 2019-02-08 ENCOUNTER — CLINICAL SUPPORT (OUTPATIENT)
Dept: REHABILITATION | Facility: HOSPITAL | Age: 64
End: 2019-02-08
Attending: SPECIALIST
Payer: COMMERCIAL

## 2019-02-08 DIAGNOSIS — Z74.09 IMPAIRED FUNCTIONAL MOBILITY, BALANCE, AND ENDURANCE: ICD-10-CM

## 2019-02-08 DIAGNOSIS — F40.298 FEAR OF FALLING: ICD-10-CM

## 2019-02-08 PROCEDURE — 97110 THERAPEUTIC EXERCISES: CPT | Mod: PO

## 2019-02-08 PROCEDURE — 97112 NEUROMUSCULAR REEDUCATION: CPT | Mod: PO

## 2019-02-08 NOTE — PROGRESS NOTES
"  Physical Therapy Daily Treatment Note     Name: Shira Vega  Clinic Number: [id number]    Therapy Diagnosis:   Encounter Diagnoses   Name Primary?    Impaired functional mobility, balance, and endurance     Fear of falling      Physician: ДМИТРИЙ Macdonald MD    Visit Date: 2/8/2019    Physician Orders: Eval and treat  Medical Diagnosis:   Dizziness and giddiness    Personal history of (healed) traumatic fracture    Personal history of other diseases of the circulatory system     Evaluation Date: 11/20/18  Authorization Period Expiration: 01/03/2019-01/03/2020  Plan of Care Certification Period: 11/20/18- 01/15/19  Extended POC Certification Period: 01/14/19- 03/11/19  Visit #/Visits authorized: 11 / 30 (11 total visits of 2019), (9 total visits in 2018)    Time In: 1430  Time Out: 1515  Total Billable Time: 45    Precautions: fall, visual deficits, standard, cognitive/emotional deficits    Subjective     Pt reports: she saw Dr. Macdonald yesterday.  Pt states she has been going to the gym 1 x/week.  She needs to look again at her HEP and discuss with primary therapist.  Response to previous treatment: Pt reports she enjoyed her last session with Logan.  Functional change: No new reports.     Pain: 0/10  Location: N/A    Objective     Patient received  Shira received therapeutic exercises to develop strength, endurance, ROM, flexibility, posture and core stabilization for 10 minutes including: therapy with activities as follows:       x 8 min on upright bicycle @ level 7 for improved CV and LE muscular endurance              2 x 30" B LE GS stretch on incline with BUE support                   Patient participated in neuromuscular re-education activities to improve: Balance for 35 minutes. The following activities were included:       Standing balance inside parallel bars:   X 4 laps forward marching with 2 " hold, no UE support, CGA/min A with occasional touchdown on bar    Outside parallel bars:   Pt performs 1 x " "10 trials of stepping onto 4 point foam fitter with one foot and stepping across with the other to land on firm ground, no UE support, CGA     On 4 point foam fitter( near ballet bar):    2 x 30" static standing, no UE support, EC, CGA    1 x 10 B SLS with alternate toe taps against large cone, 2 finger support on ballet bar, CGA    1 x 10 B SLS with alternate toe taps against large cone, 1 finger support on ballet bar, CGA    1 x 10 B SLS with alternate toe taps against large cone, no UE support, CGA     Stairs:    Ascend and descend 12 wooden steps with R hand on rail, reciprocal pattern, mod I    Ascend and descend 12 wooden steps with 2 finger support on rail, reciprocal pattern when ascending and reciprocal/step-to pattern when descending, supervision    Ascend and descend 12 wooden steps with no UE support, non-reciprocal pattern, SBA    Ascend and descend 12 wooden steps with no UE support, reciprocal pattern when ascending and non-reciprocal when descending, SBA    Ascend and descend 12 wooden steps with no UE support and reciprocal pattern when ascending and reciprocal pattern when descending while pt places fingers lightly on PT's hands for safety( CGA)~ occasional touchdown on rail or mild LOB requiring min A from PT to correct     On Bosu( soft side up and near ballet bar):    2 x 30" static standing, no UE support, min A    1 x 10 trials of stepping onto Bosu with one foot and stepping across with the other to land on firm ground, 1 finger support on ballet bar, CGA         Home Exercises Provided and Patient Education Provided     Education provided: Continue current HEP. Pt also educated on the importance of calling eye doctor to make sure she is cleared to participate in PT on Friday.    Written Home Exercises Provided: Continue current HEP.  See EMR under Media for exercises provided prior.    Assessment   Shira tolerated her therapy session well this afternoon and seemed to have minimal anxiety " when performing most tasks. PT did his best to grade all activities in order to progress pt with minimal to no UE support( particularly when performing steps). PT also tried to focus on several different activities which require SLS in one form or another.  Pt responded well to all therapy activities with reassurance and should continue to improve with further OPPT to address current balance impairments. Cont. POC to work toward achievement of below goals.    Shira is progressing well towards her goals.     Pt prognosis is Good.     Pt will continue to benefit from skilled outpatient physical therapy to address the deficits listed in the problem list box on initial evaluation, provide pt/family education and to maximize pt's level of independence in the home and community environment.     Pt's spiritual, cultural and educational needs considered and pt agreeable to plan of care and goals.    Anticipated barriers to physical therapy: cognitive/emotional deficits    GOALS:   Short term goals: 4 weeks, pt agrees to goals set.  1. Pt to be (I) with established HEP MET  2. Pt to improve L ankle IV and EV strength scores to at least 4+/5 for improved ankle strategy with balance. MET  3. Pt to improve BLE SLS scores to at least 3 sec consistently for improved safety with standing ADL MET  4. Pt to improve GST condition 2 to at least 30 seconds for improved balance and decreased fall risk MET  5. Pt to improve GST condition 3 to at least 15 seconds for improved balance and decreased fall risk. MET  6. Pt to improve GST condition 4 to at least 8 seconds for improved balance and decreased fall risk. MET  7. Pt to improve GST condition 6 to at least 10 seconds for improved balance and decreased fall risk. ON GOING  8. Pt to improve FGA score to at least 23/30 for improved balance and decreased fall risk MET     Long term goals: 8 weeks, pt agrees to goals set  9. Pt to improve BLE SLS scores to at least 6 sec consistently for  improved safety with standing ADL. ON GOING  10. Pt to improve GST condition 3 to at least 20 seconds for improved balance and decreased fall risk. MET  11. Pt to improve GST condition 4 to at least 12 seconds for improved balance and decreased fall risk. ON GOING  12. Pt to improve GST condition 6 to at least 20 seconds for improved balance and decreased fall risk. ON GOING  13. Pt to improve FGA score to at least 26/30 for improved balance and decreased fall risk. ON GOING        Plan       Pt to attend therapy 2 x weekly per Extended POC Certification Period: 01/14/19- 03/11/19. Treatment to include: pt education, HEP, therapeutic exercises, neuromuscular re-education/balance exercises, therapeutic activities, joint mobilizations, and modalities PRN, to work towards established goals. PT to continue working on activities targeting SLS, tandem stance, and stair climbing. Pt may be seen by PTA to carry out plan of care.    Matt Barbosa, PT

## 2019-02-11 ENCOUNTER — CLINICAL SUPPORT (OUTPATIENT)
Dept: REHABILITATION | Facility: HOSPITAL | Age: 64
End: 2019-02-11
Attending: SPECIALIST
Payer: COMMERCIAL

## 2019-02-11 ENCOUNTER — DOCUMENTATION ONLY (OUTPATIENT)
Dept: REHABILITATION | Facility: HOSPITAL | Age: 64
End: 2019-02-11

## 2019-02-11 DIAGNOSIS — Z74.09 IMPAIRED FUNCTIONAL MOBILITY, BALANCE, AND ENDURANCE: ICD-10-CM

## 2019-02-11 DIAGNOSIS — F40.298 FEAR OF FALLING: ICD-10-CM

## 2019-02-11 PROCEDURE — 97110 THERAPEUTIC EXERCISES: CPT | Mod: PO

## 2019-02-11 PROCEDURE — 97112 NEUROMUSCULAR REEDUCATION: CPT | Mod: PO

## 2019-02-11 NOTE — PROGRESS NOTES
"  Physical Therapy Daily Treatment Note     Name: Shira Vega  Clinic Number: [id number]    Therapy Diagnosis:   Encounter Diagnoses   Name Primary?    Impaired functional mobility, balance, and endurance     Fear of falling      Physician: ДМИТРИЙ Macdonald MD    Visit Date: 2/11/2019    Physician Orders: Eval and treat  Medical Diagnosis:   Dizziness and giddiness    Personal history of (healed) traumatic fracture    Personal history of other diseases of the circulatory system     Evaluation Date: 11/20/18  Authorization Period Expiration: 01/03/2019-01/03/2020  Plan of Care Certification Period: 11/20/18- 01/15/19  Extended POC Certification Period: 01/14/19- 03/11/19  Visit #/Visits authorized: 12 / 30 (12 total visits of 2019), (9 total visits in 2018)    Time In: 2:30  Time Out: 3:15  Total Billable Time: 45    Precautions: fall, visual deficits, standard, cognitive/emotional deficits    Subjective     Pt reports: she saw Dr. Macdonald yesterday.  Pt states she has been going to the gym 1 x/week.  She needs to look again at her HEP and discuss with primary therapist.  Response to previous treatment: Pt reports she enjoyed her last session with Logan.  Functional change: No new reports.     Pain: 0/10  Location: N/A    Objective     Patient received  Shira received therapeutic exercises to develop strength, endurance, ROM, flexibility, posture and core stabilization for 10 minutes including: therapy with activities as follows:       x 8 min on upright bicycle @ level 7 for improved CV and LE muscular endurance              2 x 30" B LE GS stretch on incline with BUE support                   Patient participated in neuromuscular re-education activities to improve: Balance for 35 minutes. The following activities were included:       Standing balance inside parallel bars:   X 4 laps forward marching with 2-3 " hold, no UE support, CGA/min A with occasional touchdown on bar    Outside parallel bars:   Pt performs 1 " "x 10 trials of stepping onto 6 " step  with one foot and stepping up and over w/contralateral foot  no UE support, SBA       Stairs:    Ascend and descend 12 with R hand on rail, reciprocal pattern, mod I    Ascend and descend 12 wooden steps with 2 finger support on rail, reciprocal pattern when ascending and reciprocal/step-to pattern when descending, supervision    Ascend and descend 12 wooden steps with no UE support, non-reciprocal pattern, SBA                         Descend with reciprocating pattern but holding each 5" statically w/ intermittent UE support    Ascend and descend 12 wooden steps with no UE support, reciprocal pattern when ascending and non-reciprocal when descending, SBA       Home Exercises Provided and Patient Education Provided     Education provided: Continue current HEP. Pt also educated on the importance of calling eye doctor to make sure she is cleared to participate in PT on Friday.    Written Home Exercises Provided: Continue current HEP.  See EMR under Media for exercises provided prior.    Assessment   Shira tolerated her therapy session well this afternoon with typical anxious functionality.   Shira is progressing well towards her goals.     Pt prognosis is Good.     Pt will continue to benefit from skilled outpatient physical therapy to address the deficits listed in the problem list box on initial evaluation, provide pt/family education and to maximize pt's level of independence in the home and community environment.     Pt's spiritual, cultural and educational needs considered and pt agreeable to plan of care and goals.    Anticipated barriers to physical therapy: cognitive/emotional deficits    GOALS:   Short term goals: 4 weeks, pt agrees to goals set.  1. Pt to be (I) with established HEP MET  2. Pt to improve L ankle IV and EV strength scores to at least 4+/5 for improved ankle strategy with balance. MET  3. Pt to improve BLE SLS scores to at least 3 sec consistently for " improved safety with standing ADL MET  4. Pt to improve GST condition 2 to at least 30 seconds for improved balance and decreased fall risk MET  5. Pt to improve GST condition 3 to at least 15 seconds for improved balance and decreased fall risk. MET  6. Pt to improve GST condition 4 to at least 8 seconds for improved balance and decreased fall risk. MET  7. Pt to improve GST condition 6 to at least 10 seconds for improved balance and decreased fall risk. ON GOING  8. Pt to improve FGA score to at least 23/30 for improved balance and decreased fall risk MET     Long term goals: 8 weeks, pt agrees to goals set  9. Pt to improve BLE SLS scores to at least 6 sec consistently for improved safety with standing ADL. ON GOING  10. Pt to improve GST condition 3 to at least 20 seconds for improved balance and decreased fall risk. MET  11. Pt to improve GST condition 4 to at least 12 seconds for improved balance and decreased fall risk. ON GOING  12. Pt to improve GST condition 6 to at least 20 seconds for improved balance and decreased fall risk. ON GOING  13. Pt to improve FGA score to at least 26/30 for improved balance and decreased fall risk. ON GOING        Plan       Pt to attend therapy 2 x weekly per Extended POC Certification Period: 01/14/19- 03/11/19. Treatment to include: pt education, HEP, therapeutic exercises, neuromuscular re-education/balance exercises, therapeutic activities, joint mobilizations, and modalities PRN, to work towards established goals. PT to continue working on activities targeting SLS, tandem stance, and stair climbing. Pt may be seen by PTA to carry out plan of care.    Logan Smith PTA

## 2019-02-11 NOTE — PROGRESS NOTES
PT/PTA met face to face to discuss pt's treatment plan and progress towards established goals. Continue with current PT POC. Pt will be seen by physical therapist at least every 6th treatment day or every 30 days, whichever occurs first.      Eder Carr, PTA  02/11/2019

## 2019-02-15 ENCOUNTER — CLINICAL SUPPORT (OUTPATIENT)
Dept: REHABILITATION | Facility: HOSPITAL | Age: 64
End: 2019-02-15
Attending: SPECIALIST
Payer: COMMERCIAL

## 2019-02-15 DIAGNOSIS — Z74.09 IMPAIRED FUNCTIONAL MOBILITY, BALANCE, AND ENDURANCE: ICD-10-CM

## 2019-02-15 DIAGNOSIS — F40.298 FEAR OF FALLING: ICD-10-CM

## 2019-02-15 PROCEDURE — 97530 THERAPEUTIC ACTIVITIES: CPT | Mod: PO

## 2019-02-15 PROCEDURE — 97110 THERAPEUTIC EXERCISES: CPT | Mod: PO

## 2019-02-15 NOTE — PROGRESS NOTES
"  Physical Therapy Daily Treatment Note     Name: Sihra Vega  Clinic Number: [id number]    Therapy Diagnosis:   Encounter Diagnoses   Name Primary?    Impaired functional mobility, balance, and endurance     Fear of falling      Physician: ДМИТРИЙ Macdonald MD    Visit Date: 2/15/2019    Physician Orders: Eval and treat  Medical Diagnosis:   Dizziness and giddiness    Personal history of (healed) traumatic fracture    Personal history of other diseases of the circulatory system     Evaluation Date: 11/20/18  Authorization Period Expiration: 01/03/2019-01/03/2020  Plan of Care Certification Period: 11/20/18- 01/15/19  Extended POC Certification Period: 01/14/19- 03/11/19  Visit #/Visits authorized: 13 / 30 (13 total visits of 2019), (9 total visits in 2018)    Time In: 14:15  Time Out: 15:00  Total Billable Time: 45 min    Precautions: fall, visual deficits, standard, cognitive/emotional deficits    Subjective     Pt reports: Feeling fine just a little soreness in her toe but it feels fine right now in her shoe.  She needs to look again at her HEP and discuss with primary therapist.  Response to previous treatment: Pt reports she enjoyed her last session with Logan.  Functional change: No new reports.     Pain: 0/10  Location: N/A    Objective   Only charged 30 mins due to 15 mins of concurrent therapy.    Patient received  Shira received therapeutic exercises to develop strength, endurance, ROM, flexibility, posture and core stabilization for 25 minutes including: therapy with activities as follows:       x 10 min on upright bicycle @ level 7 for improved CV and LE muscular endurance    3 x 30" BLE GS stretch on incline with BUE support    x 30, RLE SL heel raise; BUE support    x 30, LLE SL heel raise; BUE support    X 25 toe raises; BUE support    2 x 10: Supine hip ABD with t-band    1 x 10 standing marches with BUE support, performed on BLE    1 x 10 standing hip extension with BUE support, performed on " [facility]     Wooden Therapy Stairs:              3 x 10 RLE heel taps off bottom step, 5 inches, BUE support              3 x 10 LLE heel taps off  bottom step, 5 inches, BUE support     Patient participated in dynamic functional therapeutic activities to improve functional performance for 20 minutes. Including:     Pt was reminded to take a deep breath and remain calm. Pt was also reminded to inform the PT when a rest break was required for either legs feeling weak or anxiousness. Additionally gait belt was used during all stair training to ensure patient safety. Stair training performed on large stair case by the  to better replicated situations patient would meet in her normal environment.     Stairs training: Pt instructed to ambulate up and down a flight of 10 steps using the HR (on L when ascending) as she normally would. At the end up multiple trials, patient's speed increased to a more normal speed.   Stair training: Pt instructed to walk up 1 step, turn, and descend with out holding onto railing. Pt appeared very anxious and shaky despite no difficulty with strength when performing the task. Patient repeated this task for several minutes until task became easier and a second step was added.  No seated rest breaks were taken during the 20 minutes, but 1 standing rest break was. Pt did not experience any LOB while performing stair training.     Home Exercises Provided and Patient Education Provided     Education provided: Updated HEP given today.     Written Home Exercises Provided: Standing heel raises, standing toe raises, supine hip abduction with t-band, standing hip extension, and standing SL marches.  See EMR under Media for exercises provided 2/15/2019.    Assessment   Shira tolerated her therapy session well this afternoon with typical increase in nervousness and shakiness while performing stairs activities. Pt is able to ascend and descend 10 steps repeatedly without rest breaks for 5 minutes  "with 1 hand rail and no difficulty with SBA and no LOB. However, patient ascends one step, turns with 1 hand on the hand rail, and descends 1 step with no UE support and CGA with great difficulty because of her shakiness. When "shakiness" is present patient appears visibly flustered and tense. Pt is convinced this is due to a strength problem despite repeatedly performing activities that she would otherwise be unable to perform if this was the case. SPT is addressing this with increased exercises and a new HEP per patients request and will continue stair training, static balance, and dynamic balance training in clinic.     Shira is progressing well towards her goals.     Pt prognosis is Good.     Pt will continue to benefit from skilled outpatient physical therapy to address the deficits listed in the problem list box on initial evaluation, provide pt/family education and to maximize pt's level of independence in the home and community environment.     Pt's spiritual, cultural and educational needs considered and pt agreeable to plan of care and goals.    Anticipated barriers to physical therapy: cognitive/emotional deficits    GOALS:   Short term goals: 4 weeks, pt agrees to goals set.  1. Pt to be (I) with established HEP MET  2. Pt to improve L ankle IV and EV strength scores to at least 4+/5 for improved ankle strategy with balance. MET  3. Pt to improve BLE SLS scores to at least 3 sec consistently for improved safety with standing ADL MET  4. Pt to improve GST condition 2 to at least 30 seconds for improved balance and decreased fall risk MET  5. Pt to improve GST condition 3 to at least 15 seconds for improved balance and decreased fall risk. MET  6. Pt to improve GST condition 4 to at least 8 seconds for improved balance and decreased fall risk. MET  7. Pt to improve GST condition 6 to at least 10 seconds for improved balance and decreased fall risk. ON GOING  8. Pt to improve FGA score to at least 23/30 for " improved balance and decreased fall risk MET     Long term goals: 8 weeks, pt agrees to goals set  9. Pt to improve BLE SLS scores to at least 6 sec consistently for improved safety with standing ADL. ON GOING  10. Pt to improve GST condition 3 to at least 20 seconds for improved balance and decreased fall risk. MET  11. Pt to improve GST condition 4 to at least 12 seconds for improved balance and decreased fall risk. ON GOING  12. Pt to improve GST condition 6 to at least 20 seconds for improved balance and decreased fall risk. ON GOING  13. Pt to improve FGA score to at least 26/30 for improved balance and decreased fall risk. ON GOING        Plan   Plan for next session: Stair training, dynamic balance activities outside of the parallel bars, some static balance outside of the parallel bars.    Pt to attend therapy 2 x weekly per Extended POC Certification Period: 01/14/19- 03/11/19. Treatment to include: pt education, HEP, therapeutic exercises, neuromuscular re-education/balance exercises, therapeutic activities, joint mobilizations, and modalities PRN, to work towards established goals. PT to continue working on activities targeting SLS, tandem stance, and stair climbing. Pt may be seen by PTA to carry out plan of care.    Patsy Garcia, SPT    I certify that I was present in the room directing the student in service delivery and guiding them using my skilled judgment. As the co-signing therapist I have reviewed the students documentation and am responsible for the treatment, assessment, and plan.     BUBBA ChatterjeeT

## 2019-02-18 ENCOUNTER — CLINICAL SUPPORT (OUTPATIENT)
Dept: REHABILITATION | Facility: HOSPITAL | Age: 64
End: 2019-02-18
Attending: SPECIALIST
Payer: COMMERCIAL

## 2019-02-18 DIAGNOSIS — F40.298 FEAR OF FALLING: ICD-10-CM

## 2019-02-18 DIAGNOSIS — Z74.09 IMPAIRED FUNCTIONAL MOBILITY, BALANCE, AND ENDURANCE: ICD-10-CM

## 2019-02-18 PROCEDURE — 97110 THERAPEUTIC EXERCISES: CPT | Mod: PO

## 2019-02-18 NOTE — PROGRESS NOTES
"  Physical Therapy Daily Treatment Note     Name: Shira Vega  Clinic Number: [id number]    Therapy Diagnosis:   Encounter Diagnoses   Name Primary?    Impaired functional mobility, balance, and endurance     Fear of falling      Physician: ДМИТРИЙ Macdonald MD    Visit Date: 2/18/2019    Physician Orders: Eval and treat  Medical Diagnosis:   Dizziness and giddiness    Personal history of (healed) traumatic fracture    Personal history of other diseases of the circulatory system     Evaluation Date: 11/20/18  Authorization Period Expiration: 01/03/2019-01/03/2020  Plan of Care Certification Period: 11/20/18- 01/15/19  Extended POC Certification Period: 01/14/19- 03/11/19  Visit #/Visits authorized: 14 / 30 (14 total visits of 2019), (9 total visits in 2018)    Time In: 2:30  Time Out: 3:15  Total Billable Time: 45 min    Precautions: fall, visual deficits, standard, cognitive/emotional deficits    Subjective     Pt reports: she is okay with how she negotiates stairs.   She needs to look again at her HEP and discuss with primary therapist.  Response to previous treatment: Pt reports she enjoyed her last session with Logan.  Functional change: No new reports.     Pain: 0/10  Location: N/A    Objective     Patient received  Shira received therapeutic exercises to develop strength, endurance, ROM, flexibility, posture and core stabilization for 45 minutes including: therapy with activities as follows:       x 10 min on upright bicycle @ level 7 for improved CV and LE muscular endurance    3 x 30" BLE GS stretch on incline with BUE support    x 30, RLE SL heel raise; BUE support    x 30, LLE SL heel raise; BUE support    X 20 toe raises; BUE support    1 x 10 standing marches with BUE support, performed on BLE    1 x 10 standing hip extension with BUE support, performed on BLE      //Bars:               20 AP weight shifts in staggered stance              2 x 10 step ups to 6" step up fwd/down bkwd no UE sup             "  Fitter balance board AP weight shifts in stagard stance intermitant UE support              Fitter balance board lateral weight shifts intermittent UE sup    Prone:               Planks: 5 x 10-12 sec holds    Quadruped:              Opposite alternate arm leg lift 2 x 10    Hook Ly:              Bridges 2 x 10          Home Exercises Provided and Patient Education Provided     Education provided: Updated HEP given today.     Written Home Exercises Provided: Standing heel raises, standing toe raises, supine hip abduction with t-band, standing hip extension, and standing SL marches.  See EMR under Media for exercises provided 2/15/2019.    Assessment   Shira tolerated her therapy session well. Remains very tentative and fearful of fall.    Shira is progressing well towards her goals.     Pt prognosis is Good.     Pt will continue to benefit from skilled outpatient physical therapy to address the deficits listed in the problem list box on initial evaluation, provide pt/family education and to maximize pt's level of independence in the home and community environment.     Pt's spiritual, cultural and educational needs considered and pt agreeable to plan of care and goals.    Anticipated barriers to physical therapy: cognitive/emotional deficits    GOALS:   Short term goals: 4 weeks, pt agrees to goals set.  1. Pt to be (I) with established HEP MET  2. Pt to improve L ankle IV and EV strength scores to at least 4+/5 for improved ankle strategy with balance. MET  3. Pt to improve BLE SLS scores to at least 3 sec consistently for improved safety with standing ADL MET  4. Pt to improve GST condition 2 to at least 30 seconds for improved balance and decreased fall risk MET  5. Pt to improve GST condition 3 to at least 15 seconds for improved balance and decreased fall risk. MET  6. Pt to improve GST condition 4 to at least 8 seconds for improved balance and decreased fall risk. MET  7. Pt to improve GST condition 6 to at  least 10 seconds for improved balance and decreased fall risk. ON GOING  8. Pt to improve FGA score to at least 23/30 for improved balance and decreased fall risk MET     Long term goals: 8 weeks, pt agrees to goals set  9. Pt to improve BLE SLS scores to at least 6 sec consistently for improved safety with standing ADL. ON GOING  10. Pt to improve GST condition 3 to at least 20 seconds for improved balance and decreased fall risk. MET  11. Pt to improve GST condition 4 to at least 12 seconds for improved balance and decreased fall risk. ON GOING  12. Pt to improve GST condition 6 to at least 20 seconds for improved balance and decreased fall risk. ON GOING  13. Pt to improve FGA score to at least 26/30 for improved balance and decreased fall risk. ON GOING        Plan   Plan for next session: Stair training, dynamic balance activities outside of the parallel bars, some static balance outside of the parallel bars.    Pt to attend therapy 2 x weekly per Extended POC Certification Period: 01/14/19- 03/11/19. Treatment to include: pt education, HEP, therapeutic exercises, neuromuscular re-education/balance exercises, therapeutic activities, joint mobilizations, and modalities PRN, to work towards established goals. PT to continue working on activities targeting SLS, tandem stance, and stair climbing. Pt may be seen by PTA to carry out plan of care.    Logan Smith, VALERIE

## 2019-02-22 ENCOUNTER — CLINICAL SUPPORT (OUTPATIENT)
Dept: REHABILITATION | Facility: HOSPITAL | Age: 64
End: 2019-02-22
Attending: SPECIALIST
Payer: COMMERCIAL

## 2019-02-22 DIAGNOSIS — Z74.09 IMPAIRED FUNCTIONAL MOBILITY, BALANCE, AND ENDURANCE: ICD-10-CM

## 2019-02-22 DIAGNOSIS — F40.298 FEAR OF FALLING: ICD-10-CM

## 2019-02-22 PROCEDURE — 97110 THERAPEUTIC EXERCISES: CPT | Mod: PO

## 2019-02-22 PROCEDURE — 97112 NEUROMUSCULAR REEDUCATION: CPT | Mod: PO

## 2019-02-22 NOTE — PROGRESS NOTES
"  Physical Therapy Daily Treatment Note     Name: Shira Vega  Clinic Number: [id number]    Therapy Diagnosis:   No diagnosis found.  Physician: ДМИТРИЙ Macdonald MD    Visit Date: 2/22/2019    Physician Orders: Eval and treat  Medical Diagnosis:   Dizziness and giddiness    Personal history of (healed) traumatic fracture    Personal history of other diseases of the circulatory system     Evaluation Date: 11/20/18  Authorization Period Expiration: 01/03/2019-01/03/2020  Plan of Care Certification Period: 11/20/18- 01/15/19  Extended POC Certification Period: 01/14/19- 03/11/19  Visit #/Visits authorized: 15 / 30 (15 total visits of 2019), (9 total visits in 2018)    Time In: 14:30  Time Out: 15:15  Total Billable Time: 45 min    Precautions: fall, visual deficits, standard, cognitive/emotional deficits    Subjective     Pt reports: Doing okay today. Pt reports she had hand rails put in at her home.  HEP Compliance: Pt reports she has been compliant with her home exercise program.  Response to previous treatment: No complaints.  Functional change: No new reports.     Pain: 0/10  Location: N/A    Objective     Patient received  Shira received therapeutic exercises to develop strength, endurance, ROM, flexibility, posture and core stabilization for 25 minutes including: therapy with activities as follows:      x 10 min on upright bicycle @ level 7 for improved CV and LE muscular endurance  3 x 30" BLE GS stretch on incline with BUE support  x 30, RLE SL heel raise; BUE support  x 30, LLE SL heel raise; BUE support  x 30 toe raises; BUE support      Patient participated in neuromuscular re-education activities to improve: Balance, Coordination, Kinesthetic, Sense and Proprioception for 20 minutes. The following activities were included:     4 point foam fitter:   2 x 10 Step ups and down   1 x 10 breaking up the step ups and step downs into each individual motion and weight shift.   1 x 5 R foot up, weight shift, left " "foot up, gain balance, R or L foot step down off the front, gain balance, other foot down to ground     Wooden Stairs: x 15 minutes   Stair case of 4 wooden steps with bilateral hand rails. Practiced stepping up, weight shifting, and stepping down. Focused on breaking up the movements and gaining stability before progressing to the next steps. Progressed from step to gait pattern to reciprocal gait pattern. Pt continues to become increasingly agitated and has difficulty maintaining line of sight to her feet.     Blue foam pad:   1 x 30" NBOS, EC, no UE support, CGA, unsteadiness and anxious exacerbating swaying, VCs to look at her feet when stepping off of an object    Home Exercises Provided and Patient Education Provided     Education provided: Updated HEP given today.     Written Home Exercises Provided: Standing heel raises, standing toe raises, supine hip abduction with t-band, standing hip extension, and standing SL marches.  See EMR under Media for exercises provided 2/15/2019.    Assessment   Shira tolerated her therapy session moderately well today. She demonstrates the usual increased anxiety and unsteadiness when stepping down off steps of other balance items/surfaces. Pt has a tendency to be inattentive to her feet when stepping down and requires verbal cues to look at her feet and what she is doing. Pt continues to be an appropriate candidate for PT. Continue current POC.    Shira is progressing well towards her goals.     Pt prognosis is Good.     Pt will continue to benefit from skilled outpatient physical therapy to address the deficits listed in the problem list box on initial evaluation, provide pt/family education and to maximize pt's level of independence in the home and community environment.     Pt's spiritual, cultural and educational needs considered and pt agreeable to plan of care and goals.    Anticipated barriers to physical therapy: cognitive/emotional deficits    GOALS:   Short term " goals: 4 weeks, pt agrees to goals set.  1. Pt to be (I) with established HEP MET  2. Pt to improve L ankle IV and EV strength scores to at least 4+/5 for improved ankle strategy with balance. MET  3. Pt to improve BLE SLS scores to at least 3 sec consistently for improved safety with standing ADL MET  4. Pt to improve GST condition 2 to at least 30 seconds for improved balance and decreased fall risk MET  5. Pt to improve GST condition 3 to at least 15 seconds for improved balance and decreased fall risk. MET  6. Pt to improve GST condition 4 to at least 8 seconds for improved balance and decreased fall risk. MET  7. Pt to improve GST condition 6 to at least 10 seconds for improved balance and decreased fall risk. ON GOING  8. Pt to improve FGA score to at least 23/30 for improved balance and decreased fall risk MET     Long term goals: 8 weeks, pt agrees to goals set  9. Pt to improve BLE SLS scores to at least 6 sec consistently for improved safety with standing ADL. ON GOING  10. Pt to improve GST condition 3 to at least 20 seconds for improved balance and decreased fall risk. MET  11. Pt to improve GST condition 4 to at least 12 seconds for improved balance and decreased fall risk. ON GOING  12. Pt to improve GST condition 6 to at least 20 seconds for improved balance and decreased fall risk. ON GOING  13. Pt to improve FGA score to at least 26/30 for improved balance and decreased fall risk. ON GOING        Plan   Plan for next session: Stair training for safety with step to gait pattern, static and dynamic balance activities outside of the parallel bars, increased focus on watching feet and maintaining line of sight when stepping on and off surfaces.    Pt to attend therapy 2 x weekly per Extended POC Certification Period: 01/14/19- 03/11/19. Treatment to include: pt education, HEP, therapeutic exercises, neuromuscular re-education/balance exercises, therapeutic activities, joint mobilizations, and modalities  PRN, to work towards established goals. PT to continue working on activities targeting SLS, tandem stance, and stair climbing. Pt may be seen by PTA to carry out plan of care.    Patsy Garcia, SPT    I certify that I was present in the room directing the student in service delivery and guiding them using my skilled judgment. As the co-signing therapist I have reviewed the students documentation and am responsible for the treatment, assessment, and plan.     BUBBA ChatterjeeT

## 2019-02-25 ENCOUNTER — CLINICAL SUPPORT (OUTPATIENT)
Dept: REHABILITATION | Facility: HOSPITAL | Age: 64
End: 2019-02-25
Attending: SPECIALIST
Payer: COMMERCIAL

## 2019-02-25 DIAGNOSIS — F40.298 FEAR OF FALLING: ICD-10-CM

## 2019-02-25 DIAGNOSIS — Z74.09 IMPAIRED FUNCTIONAL MOBILITY, BALANCE, AND ENDURANCE: ICD-10-CM

## 2019-02-25 PROCEDURE — 97110 THERAPEUTIC EXERCISES: CPT | Mod: PO

## 2019-02-25 PROCEDURE — 97112 NEUROMUSCULAR REEDUCATION: CPT | Mod: PO

## 2019-02-25 NOTE — PROGRESS NOTES
"  Physical Therapy Daily Treatment Note     Name: Shira Vega  Clinic Number: [id number]    Therapy Diagnosis:   Encounter Diagnoses   Name Primary?    Impaired functional mobility, balance, and endurance     Fear of falling      Physician: ДМИТРИЙ Macdonald MD    Visit Date: 2/25/2019    Physician Orders: Eval and treat  Medical Diagnosis:   Dizziness and giddiness    Personal history of (healed) traumatic fracture    Personal history of other diseases of the circulatory system     Evaluation Date: 11/20/18  Authorization Period Expiration: 01/03/2019-01/03/2020  Plan of Care Certification Period: 11/20/18- 01/15/19  Extended POC Certification Period: 01/14/19- 03/11/19  Visit #/Visits authorized: 16 / 30 (16 total visits of 2019), (9 total visits in 2018)    Time In: 14:30  Time Out: 15:15  Total Billable Time: 45 min    Precautions: fall, visual deficits, standard, cognitive/emotional deficits    Subjective     Pt reports: Doing okay today. Pt reports she had hand rails put in at her home.  HEP Compliance: Pt reports she has been compliant with her home exercise program.  Response to previous treatment: No complaints.  Functional change: No new reports.     Pain: 0/10  Location: N/A    Objective     Patient received  Shira received therapeutic exercises to develop strength, endurance, ROM, flexibility, posture and core stabilization for 25 minutes including: therapy with activities as follows:      x 10 min on upright bicycle @ level 7 for improved CV and LE muscular endurance      //Bars:  2 x 45" BLE GS stretch on incline with BUE support  x 20 B Heel raises NO UE support  x 30, RLE SL heel raise; BUE support  x 30, LLE SL heel raise; BUE support  x 30 toe raises; NO UE support       Patient participated in neuromuscular re-education activities to improve: Balance, Coordination, Kinesthetic, Sense and Proprioception for 20 minutes. The following activities were included:     4 point foam fitter:   2 x 10 Step " "ups to 6" step  (up fwd, down bkwd)   1 x 10 breaking up the step ups and step downs into each individual motion and weight shift.   1 x 5 R foot up, weight shift, left foot up, gain balance, R or L foot step down off the front, gain balance, other foot down to ground     Wooden Stairs: x 15 minutes   Stair case of 4  Steps  Stepping up reciprocally 15 trials . Progressed from step to gait pattern to reciprocal gait pattern with 1 finger support       Home Exercises Provided and Patient Education Provided     Education provided: Updated HEP given today.     Written Home Exercises Provided: Standing heel raises, standing toe raises, supine hip abduction with t-band, standing hip extension, and standing SL marches.  See EMR under Media for exercises provided 2/15/2019.    Assessment   Shira tolerated her therapy session well. Continued anxious gross tremors.  Continue current POC.    Shira is progressing well towards her goals.     Pt prognosis is Good.     Pt will continue to benefit from skilled outpatient physical therapy to address the deficits listed in the problem list box on initial evaluation, provide pt/family education and to maximize pt's level of independence in the home and community environment.     Pt's spiritual, cultural and educational needs considered and pt agreeable to plan of care and goals.    Anticipated barriers to physical therapy: cognitive/emotional deficits    GOALS:   Short term goals: 4 weeks, pt agrees to goals set.  1. Pt to be (I) with established HEP MET  2. Pt to improve L ankle IV and EV strength scores to at least 4+/5 for improved ankle strategy with balance. MET  3. Pt to improve BLE SLS scores to at least 3 sec consistently for improved safety with standing ADL MET  4. Pt to improve GST condition 2 to at least 30 seconds for improved balance and decreased fall risk MET  5. Pt to improve GST condition 3 to at least 15 seconds for improved balance and decreased fall risk. " MET  6. Pt to improve GST condition 4 to at least 8 seconds for improved balance and decreased fall risk. MET  7. Pt to improve GST condition 6 to at least 10 seconds for improved balance and decreased fall risk. ON GOING  8. Pt to improve FGA score to at least 23/30 for improved balance and decreased fall risk MET     Long term goals: 8 weeks, pt agrees to goals set  9. Pt to improve BLE SLS scores to at least 6 sec consistently for improved safety with standing ADL. ON GOING  10. Pt to improve GST condition 3 to at least 20 seconds for improved balance and decreased fall risk. MET  11. Pt to improve GST condition 4 to at least 12 seconds for improved balance and decreased fall risk. ON GOING  12. Pt to improve GST condition 6 to at least 20 seconds for improved balance and decreased fall risk. ON GOING  13. Pt to improve FGA score to at least 26/30 for improved balance and decreased fall risk. ON GOING        Plan   Plan for next session: Stair training for safety with step to gait pattern, static and dynamic balance activities outside of the parallel bars, increased focus on watching feet and maintaining line of sight when stepping on and off surfaces.    Pt to attend therapy 2 x weekly per Extended POC Certification Period: 01/14/19- 03/11/19. Treatment to include: pt education, HEP, therapeutic exercises, neuromuscular re-education/balance exercises, therapeutic activities, joint mobilizations, and modalities PRN, to work towards established goals. PT to continue working on activities targeting SLS, tandem stance, and stair climbing. Pt may be seen by PTA to carry out plan of care.

## 2019-03-06 ENCOUNTER — CLINICAL SUPPORT (OUTPATIENT)
Dept: REHABILITATION | Facility: HOSPITAL | Age: 64
End: 2019-03-06
Attending: SPECIALIST
Payer: COMMERCIAL

## 2019-03-06 DIAGNOSIS — F40.298 FEAR OF FALLING: ICD-10-CM

## 2019-03-06 DIAGNOSIS — Z74.09 IMPAIRED FUNCTIONAL MOBILITY, BALANCE, AND ENDURANCE: ICD-10-CM

## 2019-03-06 PROCEDURE — 97110 THERAPEUTIC EXERCISES: CPT | Mod: PO

## 2019-03-06 PROCEDURE — 97112 NEUROMUSCULAR REEDUCATION: CPT | Mod: PO

## 2019-03-06 NOTE — PROGRESS NOTES
"  Physical Therapy Daily Treatment Note     Name: Shira Vega  Clinic Number: [id number]    Therapy Diagnosis:   Encounter Diagnoses   Name Primary?    Impaired functional mobility, balance, and endurance     Fear of falling      Physician: ДМИТРИЙ Macdonald MD    Visit Date: 3/6/2019    Physician Orders: Eval and treat  Medical Diagnosis:   Dizziness and giddiness    Personal history of (healed) traumatic fracture    Personal history of other diseases of the circulatory system     Evaluation Date: 11/20/18  Authorization Period Expiration: 01/03/2019-01/03/2020  Extended POC Certification Period: 03/06/19 to 04/03/19  Visit #/Visits authorized: 17 / 30 (17 total visits of 2019), (9 total visits in 2018)    Time In: 11:15  Time Out: 12:00  Total Billable Time: 45 min    Precautions: fall, visual deficits, standard, cognitive/emotional deficits    Subjective     Pt reports: that she has not returned to her spinning classes. She would like to return to spinning but is unsure if the doctor who performed her cataract surgery will allow her to do so.   HEP Compliance: Pt reports she has been compliant with her home exercise program.  Response to previous treatment: No complaints.  Functional change: No new reports.     Pain: 0/10  Location: N/A    Objective     Patient received  Shira received therapeutic exercises to develop strength, endurance, ROM, flexibility, posture and core stabilization for 15 minutes including: therapy with activities as follows:      x 10 min on upright bicycle at level 7 for improved CV and LE muscular endurance    3 x 30" standing B GS stretch on incline with BUE support     Patient participated in neuromuscular re-education activities to improve: Balance, Coordination, Kinesthetic, Sense and Proprioception for 30 minutes. The following activities were included:    X 4 laps tandem ambulation,  No UE support, CGA to occ Min a       Evaluation 12/24/18 01/14/09 03/06/19   Single Limb Stance R " "LE 2 sec  (<10 sec = HIGH FALL RISK) 7 sec  (<10 sec = HIGH FALL RISK) 8 sec  (<10 sec = HIGH FALL RISK)     with CGA 6 sec  (<10 sec = HIGH FALL RISK)   Single Limb Stance L LE 1 sec  (<10 sec = HIGH FALL RISK) 2 sec  (<10 sec = HIGH FALL RISK) 3 sec   (<10 sec = HIGH FALL RISK)     with CGA 5 sec  (<10 sec = HIGH FALL RISK)         JENNIFER SENSORY TESTING:  (P= Pass, F= Fail; note any sway; hold each position for 30")  Condition 1: (firm surface/feet together/eyes open) P  Condition 2: (firm surface/feet together/eyes closed) P   Condition 3: (firm surface/feet in tandem/eyes open) P, with CGA  Condition 4: (firm surface/feet in tandem/eyes closed) F, 18 sec with CGA   Condition 5: (soft surface/feet together/eyes open) P  Condition 6: (soft surface/feet together/eyes closed) F, 21 sec with CGA  Condition 7: (Fakuda step test), measure distance varied from center starting position NT     Functional Gait Assessment:   1. Gait on level surface =  3  2. Change in Gait Speed = 3  3. Gait with horizontal head turns  = 3  4. Gait with vertical head turns = 3  5. Gait with pivot turns = 2  6. Step over obstacle = 3  7. Gait with Narrow GAYATRI = 2  8. Gait with eyes closed = 3  9. Ambulating Backwards = 2  10. Steps = 2     Score  26/30     Home Exercises Provided and Patient Education Provided     Education provided: Pt instructed to call MD who performed her cataract surgery in fall 2018 to make sure she can safely resume spinning classes. Pt verbalized good understanding to PT  Written Home Exercises Provided: Continue current HEP. Standing heel raises, standing toe raises, supine hip abduction with t-band, standing hip extension, and standing SL marches.  See EMR under Media for exercises provided 2/15/2019.    Assessment   Shira tolerated her therapy session well this morning. Pt reassessed for POC update. Shira continues to make great progress with therapy. She demonstrates improved both static and dynamic balance " compared to prior formal assessment. She demonstrates improved score on both conditions 4 and 6 on GST compared to prior trial. She also demonstrates a 3 sec improved LLE SLS score, but a 1 sec decrease in RLE SLS score. Both SLS scores do continue to place her at an increased fall risk at this time. FGA score improved by 2 points compared to prior trial. Her current score does place her out of an elevated fall risk category. However, she continues to appear visibly nervous and begins to demonstrate increased BLE tremors and/or attempts to reach for immediate BUE support, with any slight perturbation to her balance. Her anxious behavioral responses when balance is challenged are actually her greatest contributing deficit to remaining mobility impairments. Over last POC, therapy sessions have heavily focused on calming strategies to improve both balance and safety during more challenging activities, especially stair descent. However, patient does continue to require VCs to slow down, to calm down, and to attend on task at hand when her balance is challenged to prevent fall. At this time, recommend that patient uses HR when descending steps for safety when available; if HR not available, then PT recommends that patient descend steps in a NR pattern, with supervision if able for safety. PT also discussed importance on planning exercise maintenance to prepare for eventual discharge from OP PT. Therefore, PT to extend POC x 4 more weeks to address remaining balance and mobility deficits and to finalize HEP/gym routine for upcoming discharge. Anticipate d/c once current POC is completed.       Shira is progressing well towards her goals.     Pt prognosis is Good.     Pt will continue to benefit from skilled outpatient physical therapy to address the deficits listed in the problem list box on initial evaluation, provide pt/family education and to maximize pt's level of independence in the home and community environment.      Pt's spiritual, cultural and educational needs considered and pt agreeable to plan of care and goals.    Anticipated barriers to physical therapy: cognitive/emotional deficits    GOALS:   Short term goals: 4 weeks, pt agrees to goals set.  1. Pt to be (I) with established HEP MET  2. Pt to improve L ankle IV and EV strength scores to at least 4+/5 for improved ankle strategy with balance. MET  3. Pt to improve BLE SLS scores to at least 3 sec consistently for improved safety with standing ADL MET  4. Pt to improve GST condition 2 to at least 30 seconds for improved balance and decreased fall risk MET  5. Pt to improve GST condition 3 to at least 15 seconds for improved balance and decreased fall risk. MET  6. Pt to improve GST condition 4 to at least 8 seconds for improved balance and decreased fall risk. MET  7. Pt to improve GST condition 6 to at least 10 seconds for improved balance and decreased fall risk. MET 03/06/19  8. Pt to improve FGA score to at least 23/30 for improved balance and decreased fall risk MET     Long term goals: 8 weeks, pt agrees to goals set  9. Pt to improve BLE SLS scores to at least 6 sec consistently for improved safety with standing ADL. Progressing  10. Pt to improve GST condition 3 to at least 20 seconds for improved balance and decreased fall risk. MET  11. Pt to improve GST condition 4 to at least 12 seconds for improved balance and decreased fall risk. MET 03/06/19  12. Pt to improve GST condition 6 to at least 20 seconds for improved balance and decreased fall risk. MET 03/06/19  13. Pt to improve FGA score to at least 26/30 for improved balance and decreased fall risk. MET 03/06/19      Plan   PT to extend POC x 4 more weeks.     Plan for next session: Stair training for safety with step to gait pattern, static and dynamic balance activities outside of the parallel bars, increased focus on watching feet and maintaining line of sight when stepping on and off surfaces.    Pt  to attend therapy 2 x weekly per Extended POC Certification Period: 03/06/19 to 04/03/19. Treatment to include: pt education, HEP, therapeutic exercises, neuromuscular re-education/balance exercises, therapeutic activities, joint mobilizations, and modalities PRN, to work towards established goals. PT to continue working on activities targeting SLS, tandem stance, and stair climbing. Pt may be seen by PTA to carry out plan of care.

## 2019-03-06 NOTE — PLAN OF CARE
"  Physical Therapy Daily Treatment Note     Name: Shira Vega  Clinic Number: [id number]    Therapy Diagnosis:   Encounter Diagnoses   Name Primary?    Impaired functional mobility, balance, and endurance     Fear of falling      Physician: ДМИТРИЙ Macdonald MD    Visit Date: 3/6/2019    Physician Orders: Eval and treat  Medical Diagnosis:   Dizziness and giddiness    Personal history of (healed) traumatic fracture    Personal history of other diseases of the circulatory system     Evaluation Date: 11/20/18  Authorization Period Expiration: 01/03/2019-01/03/2020  Extended POC Certification Period: 03/06/19 to 04/03/19  Visit #/Visits authorized: 17 / 30 (17 total visits of 2019), (9 total visits in 2018)    Time In: 11:15  Time Out: 12:00  Total Billable Time: 45 min    Precautions: fall, visual deficits, standard, cognitive/emotional deficits    Subjective     Pt reports: that she has not returned to her spinning classes. She would like to return to spinning but is unsure if the doctor who performed her cataract surgery will allow her to do so.   HEP Compliance: Pt reports she has been compliant with her home exercise program.  Response to previous treatment: No complaints.  Functional change: No new reports.     Pain: 0/10  Location: N/A    Objective     Patient received  Shira received therapeutic exercises to develop strength, endurance, ROM, flexibility, posture and core stabilization for 15 minutes including: therapy with activities as follows:      x 10 min on upright bicycle at level 7 for improved CV and LE muscular endurance    3 x 30" standing B GS stretch on incline with BUE support     Patient participated in neuromuscular re-education activities to improve: Balance, Coordination, Kinesthetic, Sense and Proprioception for 30 minutes. The following activities were included:    X 4 laps tandem ambulation,  No UE support, CGA to occ Min a       Evaluation 12/24/18 01/14/09 03/06/19   Single Limb Stance R " "LE 2 sec  (<10 sec = HIGH FALL RISK) 7 sec  (<10 sec = HIGH FALL RISK) 8 sec  (<10 sec = HIGH FALL RISK)     with CGA 6 sec  (<10 sec = HIGH FALL RISK)   Single Limb Stance L LE 1 sec  (<10 sec = HIGH FALL RISK) 2 sec  (<10 sec = HIGH FALL RISK) 3 sec   (<10 sec = HIGH FALL RISK)     with CGA 5 sec  (<10 sec = HIGH FALL RISK)         JENNIFER SENSORY TESTING:  (P= Pass, F= Fail; note any sway; hold each position for 30")  Condition 1: (firm surface/feet together/eyes open) P  Condition 2: (firm surface/feet together/eyes closed) P   Condition 3: (firm surface/feet in tandem/eyes open) P, with CGA  Condition 4: (firm surface/feet in tandem/eyes closed) F, 18 sec with CGA   Condition 5: (soft surface/feet together/eyes open) P  Condition 6: (soft surface/feet together/eyes closed) F, 21 sec with CGA  Condition 7: (Fakuda step test), measure distance varied from center starting position NT     Functional Gait Assessment:   1. Gait on level surface =  3  2. Change in Gait Speed = 3  3. Gait with horizontal head turns  = 3  4. Gait with vertical head turns = 3  5. Gait with pivot turns = 2  6. Step over obstacle = 3  7. Gait with Narrow GAYATRI = 2  8. Gait with eyes closed = 3  9. Ambulating Backwards = 2  10. Steps = 2     Score  26/30     Home Exercises Provided and Patient Education Provided     Education provided: Pt instructed to call MD who performed her cataract surgery in fall 2018 to make sure she can safely resume spinning classes. Pt verbalized good understanding to PT  Written Home Exercises Provided: Continue current HEP. Standing heel raises, standing toe raises, supine hip abduction with t-band, standing hip extension, and standing SL marches.  See EMR under Media for exercises provided 2/15/2019.    Assessment   Shira tolerated her therapy session well this morning. Pt reassessed for POC update. Shira continues to make great progress with therapy. She demonstrates improved both static and dynamic balance " compared to prior formal assessment. She demonstrates improved score on both conditions 4 and 6 on GST compared to prior trial. She also demonstrates a 3 sec improved LLE SLS score, but a 1 sec decrease in RLE SLS score. Both SLS scores do continue to place her at an increased fall risk at this time. FGA score improved by 2 points compared to prior trial. Her current score does place her out of an elevated fall risk category. However, she continues to appear visibly nervous and begins to demonstrate increased BLE tremors and/or attempts to reach for immediate BUE support, with any slight perturbation to her balance. Her anxious behavioral responses when balance is challenged are actually her greatest contributing deficit to remaining mobility impairments. Over last POC, therapy sessions have heavily focused on calming strategies to improve both balance and safety during more challenging activities, especially stair descent. However, patient does continue to require VCs to slow down, to calm down, and to attend on task at hand when her balance is challenged to prevent fall. At this time, recommend that patient uses HR when descending steps for safety when available; if HR not available, then PT recommends that patient descend steps in a NR pattern, with supervision if able for safety. PT also discussed importance on planning exercise maintenance to prepare for eventual discharge from OP PT. Therefore, PT to extend POC x 4 more weeks to address remaining balance and mobility deficits and to finalize HEP/gym routine for upcoming discharge. Anticipate d/c once current POC is completed.       Shira is progressing well towards her goals.     Pt prognosis is Good.     Pt will continue to benefit from skilled outpatient physical therapy to address the deficits listed in the problem list box on initial evaluation, provide pt/family education and to maximize pt's level of independence in the home and community environment.      Pt's spiritual, cultural and educational needs considered and pt agreeable to plan of care and goals.    Anticipated barriers to physical therapy: cognitive/emotional deficits    GOALS:   Short term goals: 4 weeks, pt agrees to goals set.  1. Pt to be (I) with established HEP MET  2. Pt to improve L ankle IV and EV strength scores to at least 4+/5 for improved ankle strategy with balance. MET  3. Pt to improve BLE SLS scores to at least 3 sec consistently for improved safety with standing ADL MET  4. Pt to improve GST condition 2 to at least 30 seconds for improved balance and decreased fall risk MET  5. Pt to improve GST condition 3 to at least 15 seconds for improved balance and decreased fall risk. MET  6. Pt to improve GST condition 4 to at least 8 seconds for improved balance and decreased fall risk. MET  7. Pt to improve GST condition 6 to at least 10 seconds for improved balance and decreased fall risk. MET 03/06/19  8. Pt to improve FGA score to at least 23/30 for improved balance and decreased fall risk MET     Long term goals: 8 weeks, pt agrees to goals set  9. Pt to improve BLE SLS scores to at least 6 sec consistently for improved safety with standing ADL. Progressing  10. Pt to improve GST condition 3 to at least 20 seconds for improved balance and decreased fall risk. MET  11. Pt to improve GST condition 4 to at least 12 seconds for improved balance and decreased fall risk. MET 03/06/19  12. Pt to improve GST condition 6 to at least 20 seconds for improved balance and decreased fall risk. MET 03/06/19  13. Pt to improve FGA score to at least 26/30 for improved balance and decreased fall risk. MET 03/06/19      Plan   PT to extend POC x 4 more weeks.     Plan for next session: Stair training for safety with step to gait pattern, static and dynamic balance activities outside of the parallel bars, increased focus on watching feet and maintaining line of sight when stepping on and off surfaces.    Pt  to attend therapy 2 x weekly per Extended POC Certification Period: 03/06/19 to 04/03/19. Treatment to include: pt education, HEP, therapeutic exercises, neuromuscular re-education/balance exercises, therapeutic activities, joint mobilizations, and modalities PRN, to work towards established goals. PT to continue working on activities targeting SLS, tandem stance, and stair climbing. Pt may be seen by PTA to carry out plan of care.

## 2019-03-07 ENCOUNTER — DOCUMENTATION ONLY (OUTPATIENT)
Dept: REHABILITATION | Facility: HOSPITAL | Age: 64
End: 2019-03-07

## 2019-03-08 NOTE — PROGRESS NOTES
PT/PTA met face to face to discuss pt's treatment plan and progress towards established goals. Continue with current PT POC. Pt will be seen by physical therapist at least every 6th treatment day or every 30 days, whichever occurs first.      Eder Carr, PTA  03/07/2019

## 2019-03-15 ENCOUNTER — CLINICAL SUPPORT (OUTPATIENT)
Dept: REHABILITATION | Facility: HOSPITAL | Age: 64
End: 2019-03-15
Attending: SPECIALIST
Payer: COMMERCIAL

## 2019-03-15 DIAGNOSIS — Z74.09 IMPAIRED FUNCTIONAL MOBILITY, BALANCE, AND ENDURANCE: ICD-10-CM

## 2019-03-15 DIAGNOSIS — F40.298 FEAR OF FALLING: ICD-10-CM

## 2019-03-15 PROCEDURE — 97110 THERAPEUTIC EXERCISES: CPT | Mod: PO

## 2019-03-15 PROCEDURE — 97112 NEUROMUSCULAR REEDUCATION: CPT | Mod: PO

## 2019-03-15 NOTE — PROGRESS NOTES
"  Physical Therapy Daily Treatment Note     Name: Shira Vega  Clinic Number: [id number]    Therapy Diagnosis:   Encounter Diagnoses   Name Primary?    Impaired functional mobility, balance, and endurance     Fear of falling      Physician: ДМИТРИЙ Macdonald MD    Visit Date: 3/15/2019    Physician Orders: Eval and treat  Medical Diagnosis:   Dizziness and giddiness    Personal history of (healed) traumatic fracture    Personal history of other diseases of the circulatory system     Evaluation Date: 11/20/18  Authorization Period Expiration: 01/03/2019-01/03/2020  Extended POC Certification Period: 03/06/19 to 04/03/19  Visit #/Visits authorized: 18 / 30 (17 total visits of 2019), (9 total visits in 2018)    Time In: 2:00  Time Out: 3:00  Total Billable Time: 60 min    Precautions: fall, visual deficits, standard, cognitive/emotional deficits    Subjective     Pt reports: having difficulty descending stairs.   HEP Compliance: Pt reports she has been compliant with her home exercise program.  Response to previous treatment: No complaints.  Functional change: No new reports.     Pain: 0/10  Location: N/A    Objective     Patient received  Shira received therapeutic exercises to develop strength, endurance, ROM, flexibility, posture and core stabilization for 15 minutes including: therapy with activities as follows:      x 10 min on upright bicycle at level 7 for improved CV and LE muscular endurance    3 x 30" standing B GS stretch on incline with BUE support     Patient participated in neuromuscular re-education activities to improve: Balance, Coordination, Kinesthetic, Sense and Proprioception for 45 minutes. The following activities were included:    4 point fitter: at ballet bar   10 x step downs each LE, CGA, no UE support   10x step overs each LE, CGA, with occ touchdown support    Stairs in neuro gym:   3 flights of stairs ascending and descending using step to gait pattern, with RUE support with ascent and " LUE support with descent, CGA  3 flights of reciprocal gait pattern ascending stairs (up with R) and 3 flights of step to gait pattern descending stairs (down with L), with RUE support with ascent and LUE support with descent, CGA  3 flights of reciprocal gait pattern ascending and 3 flights of step to gait pattern descending stairs (down with R), with RUE support with ascent and LUE support with descent, CGA/Min A    Static Standing: in // bars   Level surface    2 x 30 sec in tandem stance, CGA, CGA/min A, occ UE support    2 x 30 sec B SL stance with contralateral LE on soccer ball, CGA/min A, occ UE support   Foam    2 x 30 sec, NBOS EC, CGA/Min A no UE support       2 laps, marching with 3 sec hold, CGA/min A, occ touchdown support   2 laps, tandem walking,CGA/min A, occ touchdown support   X 4 laps tandem ambulation,  No UE support, CGA/min A, occ UE support       Home Exercises Provided and Patient Education Provided     Education provided: Pt instructed to call MD who performed her cataract surgery in fall 2018 to make sure she can safely resume spinning classes. Pt verbalized good understanding to PT  Written Home Exercises Provided: Continue current HEP. Standing heel raises, standing toe raises, supine hip abduction with t-band, standing hip extension, and standing SL marches.  See EMR under Media for exercises provided 2/15/2019.    Assessment   Shira tolerated tx fairly well this morning. Pt demonstrates significant BLE instability and lack of eccentric control with BLEs. The instability is acerbated by pt's lack of self confidence and anxious behavior. Pt could benefit from performing exercises emphasizing BLE eccentric control and SL activities in order to promote overall stability.       Shira is progressing well towards her goals.     Pt prognosis is Good.     Pt will continue to benefit from skilled outpatient physical therapy to address the deficits listed in the problem list box on initial  evaluation, provide pt/family education and to maximize pt's level of independence in the home and community environment.     Pt's spiritual, cultural and educational needs considered and pt agreeable to plan of care and goals.    Anticipated barriers to physical therapy: cognitive/emotional deficits    GOALS:   Short term goals: 4 weeks, pt agrees to goals set.  1. Pt to be (I) with established HEP MET  2. Pt to improve L ankle IV and EV strength scores to at least 4+/5 for improved ankle strategy with balance. MET  3. Pt to improve BLE SLS scores to at least 3 sec consistently for improved safety with standing ADL MET  4. Pt to improve GST condition 2 to at least 30 seconds for improved balance and decreased fall risk MET  5. Pt to improve GST condition 3 to at least 15 seconds for improved balance and decreased fall risk. MET  6. Pt to improve GST condition 4 to at least 8 seconds for improved balance and decreased fall risk. MET  7. Pt to improve GST condition 6 to at least 10 seconds for improved balance and decreased fall risk. MET 03/06/19  8. Pt to improve FGA score to at least 23/30 for improved balance and decreased fall risk MET     Long term goals: 8 weeks, pt agrees to goals set  9. Pt to improve BLE SLS scores to at least 6 sec consistently for improved safety with standing ADL. Progressing  10. Pt to improve GST condition 3 to at least 20 seconds for improved balance and decreased fall risk. MET  11. Pt to improve GST condition 4 to at least 12 seconds for improved balance and decreased fall risk. MET 03/06/19  12. Pt to improve GST condition 6 to at least 20 seconds for improved balance and decreased fall risk. MET 03/06/19  13. Pt to improve FGA score to at least 26/30 for improved balance and decreased fall risk. MET 03/06/19      Plan   PT to extend POC x 4 more weeks.     Plan for next session: Stair training for safety with step to gait pattern, static and dynamic balance activities outside of  the parallel bars, increased focus on watching feet and maintaining line of sight when stepping on and off surfaces.    Pt to attend therapy 2 x weekly per Extended POC Certification Period: 03/06/19 to 04/03/19. Treatment to include: pt education, HEP, therapeutic exercises, neuromuscular re-education/balance exercises, therapeutic activities, joint mobilizations, and modalities PRN, to work towards established goals. PT to continue working on activities targeting SLS, tandem stance, and stair climbing. Pt may be seen by PTA to carry out plan of care.

## 2019-03-18 ENCOUNTER — CLINICAL SUPPORT (OUTPATIENT)
Dept: REHABILITATION | Facility: HOSPITAL | Age: 64
End: 2019-03-18
Attending: SPECIALIST
Payer: COMMERCIAL

## 2019-03-18 DIAGNOSIS — Z74.09 IMPAIRED FUNCTIONAL MOBILITY, BALANCE, AND ENDURANCE: ICD-10-CM

## 2019-03-18 DIAGNOSIS — F40.298 FEAR OF FALLING: ICD-10-CM

## 2019-03-18 PROCEDURE — 97110 THERAPEUTIC EXERCISES: CPT | Mod: PO

## 2019-03-18 PROCEDURE — 97112 NEUROMUSCULAR REEDUCATION: CPT | Mod: PO

## 2019-03-18 NOTE — PROGRESS NOTES
"  Physical Therapy Daily Treatment Note     Name: Shira Vega  Clinic Number: [id number]    Therapy Diagnosis:   Encounter Diagnoses   Name Primary?    Impaired functional mobility, balance, and endurance     Fear of falling      Physician: ДМИТРИЙ Macdonald MD    Visit Date: 3/18/2019    Physician Orders: Eval and treat  Medical Diagnosis:   Dizziness and giddiness    Personal history of (healed) traumatic fracture    Personal history of other diseases of the circulatory system     Evaluation Date: 11/20/18  Authorization Period Expiration: 01/03/2019-01/03/2020  Extended POC Certification Period: 03/06/19 to 04/03/19  Visit #/Visits authorized: 19 / 30 (19 total visits of 2019), (9 total visits in 2018)    Time In: 14:30  Time Out: 15:15  Total Billable Time: 45 min    Precautions: fall, visual deficits, standard, cognitive/emotional deficits    Subjective     Pt reports: that she has been doing fairly well. One of her pairs of shoes rubbed a bruise on her toe which she is monitoring.   HEP Compliance: Pt reports she has been compliant with her home exercise program.  Response to previous treatment: No complaints.  Functional change: No new reports.     Pain: 0/10  Location: N/A    Objective     Patient received  Shira received therapeutic exercises to develop strength, endurance, ROM, flexibility, posture and core stabilization for 15 minutes including: therapy with activities as follows:      x 10 min on upright bicycle at level 7 for improved CV and LE muscular endurance    3 x 30" standing B GS stretch on incline with BUE support   2 x 20 BLE standing heel raises, no UE support    Patient participated in neuromuscular re-education activities to improve: Balance, Coordination, Kinesthetic, Sense and Proprioception for 30 minutes. The following activities were included:    Wooden stair case:   X 3 trials ascend <> descend 4 steps with no HR; ascend in reciprocal pattern, descend in NR pattern, SBA; 1st trial, " "pat appeared nervous and shaky; c/ VCs to breathe and to look at immediate step in front of patient, steadiness and shakiness improved significantly     2 x 30" each LE, SLS with alternate LE on BOSU, soft side down, no UE support, CGA  2 x 30" each LE, SLS with alternate LE on BOSU + forward press with soccer ball, soft side down, no UE support, CGA    X 4 laps tandem ambulation in // bars, CGA to occ Min A; pt continues to want to grab bar to steady; VCs to decrease UE support    2 x 30" each LE in front, tandem stance, CGA, no UE support    Foam pad: CGA, no UE support   X 30" static stance with narrow GAYATRI, EO   3 x 30" static stance with narrow GAYATRI, EC   2 x 30" R<>L head turns with narrow GAYATRI, EO   2 x 30" up <> down head turns with narrow GAYATRI, EO    Home Exercises Provided and Patient Education Provided     Education provided: Pt instructed to call MD who performed her cataract surgery in fall 2018 to make sure she can safely resume spinning classes. Pt verbalized good understanding to PT  Written Home Exercises Provided: Continue current HEP. Standing heel raises, standing toe raises, supine hip abduction with t-band, standing hip extension, and standing SL marches.  See EMR under Media for exercises provided 2/15/2019.    Assessment   Shira tolerated therapy session well this afternoon. Pt continues to demonstrate anxious behavior when balance is perturbed. However, when PT provides VCs to breathe and to focus on task, pt's steadiness does improve. Balance remains most challenged in narrow GAYATRI, LLE SLS, and when vision is eliminated. PT also reinforced proper stair descent safety with VCs to look at the next immediate step to make sure foot placement is secure, to take her time, and to remain calm. During 1st trial with stair descent, pt demonstrate anxious behavior with associated BLE shakiness. However, she was able to improve technique and safety with VCs from PT for subsequent trials. Cont. POC to " further reinforce safety with balance challenges.       Shira is progressing well towards her goals.     Pt prognosis is Good.     Pt will continue to benefit from skilled outpatient physical therapy to address the deficits listed in the problem list box on initial evaluation, provide pt/family education and to maximize pt's level of independence in the home and community environment.     Pt's spiritual, cultural and educational needs considered and pt agreeable to plan of care and goals.    Anticipated barriers to physical therapy: cognitive/emotional deficits    GOALS:   Short term goals: 4 weeks, pt agrees to goals set.  1. Pt to be (I) with established HEP MET  2. Pt to improve L ankle IV and EV strength scores to at least 4+/5 for improved ankle strategy with balance. MET  3. Pt to improve BLE SLS scores to at least 3 sec consistently for improved safety with standing ADL MET  4. Pt to improve GST condition 2 to at least 30 seconds for improved balance and decreased fall risk MET  5. Pt to improve GST condition 3 to at least 15 seconds for improved balance and decreased fall risk. MET  6. Pt to improve GST condition 4 to at least 8 seconds for improved balance and decreased fall risk. MET  7. Pt to improve GST condition 6 to at least 10 seconds for improved balance and decreased fall risk. MET 03/06/19  8. Pt to improve FGA score to at least 23/30 for improved balance and decreased fall risk MET     Long term goals: 8 weeks, pt agrees to goals set  9. Pt to improve BLE SLS scores to at least 6 sec consistently for improved safety with standing ADL. Progressing  10. Pt to improve GST condition 3 to at least 20 seconds for improved balance and decreased fall risk. MET  11. Pt to improve GST condition 4 to at least 12 seconds for improved balance and decreased fall risk. MET 03/06/19  12. Pt to improve GST condition 6 to at least 20 seconds for improved balance and decreased fall risk. MET 03/06/19  13. Pt to  improve FGA score to at least 26/30 for improved balance and decreased fall risk. MET 03/06/19      Plan   Plan for next session: Stair training for safety with step to gait pattern, static and dynamic balance activities outside of the parallel bars, increased focus on watching feet and maintaining line of sight when stepping on and off surfaces.    Pt to attend therapy 2 x weekly per Extended POC Certification Period: 03/06/19 to 04/03/19. Treatment to include: pt education, HEP, therapeutic exercises, neuromuscular re-education/balance exercises, therapeutic activities, joint mobilizations, and modalities PRN, to work towards established goals. PT to continue working on activities targeting SLS, tandem stance, and stair climbing. Pt may be seen by PTA to carry out plan of care.

## 2019-03-22 ENCOUNTER — CLINICAL SUPPORT (OUTPATIENT)
Dept: REHABILITATION | Facility: HOSPITAL | Age: 64
End: 2019-03-22
Attending: SPECIALIST
Payer: COMMERCIAL

## 2019-03-22 DIAGNOSIS — Z74.09 IMPAIRED FUNCTIONAL MOBILITY, BALANCE, AND ENDURANCE: ICD-10-CM

## 2019-03-22 DIAGNOSIS — F40.298 FEAR OF FALLING: ICD-10-CM

## 2019-03-22 PROCEDURE — 97112 NEUROMUSCULAR REEDUCATION: CPT | Mod: PO

## 2019-03-22 PROCEDURE — 97110 THERAPEUTIC EXERCISES: CPT | Mod: PO

## 2019-03-22 NOTE — PROGRESS NOTES
"  Physical Therapy Daily Treatment Note     Name: Shira Vega  Clinic Number: [id number]    Therapy Diagnosis:   No diagnosis found.  Physician: ДМИТРИЙ Macdonald MD    Visit Date: 3/22/2019    Physician Orders: Eval and treat  Medical Diagnosis:   Dizziness and giddiness    Personal history of (healed) traumatic fracture    Personal history of other diseases of the circulatory system     Evaluation Date: 11/20/18  Authorization Period Expiration: 01/03/2019-01/03/2020  Extended POC Certification Period: 03/06/19 to 04/03/19  Visit #/Visits authorized: 19 / 30 (19 total visits of 2019), (9 total visits in 2018)    Time In: 14:05  Time Out: 15:00  Total Billable Time: 55 min    Precautions: fall, visual deficits, standard, cognitive/emotional deficits    Subjective     Pt reports: A dress up shoe is still bothering her 4th toe on R foot. Also reports having great difficulty going down the stairs without UE support.  HEP Compliance: Pt reports she has been compliant with her home exercise program.  Response to previous treatment: No complaints.  Functional change: No new reports.     Pain: 0/10  Location: N/A    Objective     Patient received  Shira received therapeutic exercises to develop strength, endurance, ROM, flexibility, posture and core stabilization for 15 minutes including: therapy with activities as follows:      x 10 min on upright bicycle at level 7 for improved CV and LE muscular endurance    3 x 30" standing B GS stretch on incline with BUE support   2 x 20 BLE standing heel raises, no UE support    Patient participated in neuromuscular re-education activities to improve: Balance, Coordination, Kinesthetic, Sense and Proprioception for 40 minutes. The following activities were included:    Wooden stair case:   X 3 trials ascend <> descend 4 steps with no HR; ascend in reciprocal pattern, descend in NR pattern (descending with RLE and weightbearing with LLE), CGA; pt demonstrated some initial " "instability but demonstarated much improved stability in second and third trials.      X 3 trials ascend 4 steps with no HR <> descend 4 steps with 1 HR LUE; ascend in reciprocal pattern, descend in NR pattern (descending with LLE and weightbearing with RLE), CGA; pt appeared nervous and shaky and could not release HR with LUE despite repeated verbal cues in a calm manner.    Pt descended 1 step leading with LLE and on UE support with much anxiety and shakiness. Pt then proceeded to perform same task from the second step and again from the first step. Pt then proceeded to perform same task from the third step, then the second step and again from the first step. Pt then proceeded to perform same task from the fourth step, then the third step, then the second step and again from the first step. Lastly, pt repeated 2 trials of descents without HR of 4 steps leading with LLE. Pt grew in confidence of ability to perform task safely with each trial.       X 2 laps tandem ambulation in // bars, CGA to occ Min A; pt continues to want to grab bar to steady; VCs to decrease UE support  X 2 laps marching with 3 sec holds and DF with non-weightbearing LE at ballet bar, CGA to occ Min A;  VCs to DF    2 x 30" each LE in front, tandem stance, LLE in posterior position: CGA, no UE support; RLE in posterior position CGA, occ UE support      Foam pad:    X 30" static stance with narrow GAYATRI, EO, CGA, no UE support   3 x 30" static stance with narrow GAYATRI, EC, Min A, occ UE support   2 x 30" R<>L head turns with narrow GAYATRI, EO, Min A, no UE support   2 x 30" up <> down head turns with narrow GAYATRI, EO, Min A, no UE support    Home Exercises Provided and Patient Education Provided     Education provided: Pt instructed to call MD who performed her cataract surgery in fall 2018 to make sure she can safely resume spinning classes. Pt verbalized good understanding to PT  Written Home Exercises Provided: Continue current HEP. Standing heel " raises, standing toe raises, supine hip abduction with t-band, standing hip extension, and standing SL marches.  See EMR under Media for exercises provided 2/15/2019.    Assessment   Shira tolerated therapy session well this afternoon. Pt continues to demonstrate anxious behavior, specially when descending with LLE and weightbearing with RLE with no UE support. Pt was informed that her difficulty was psychological in nature since she only displays minor instability and hesitation when descending with her stronger leg (RLE) and weightbearing with her weaker leg (LLE), but significant instability and hesitation when descending with her weaker leg (LLE) and weightbearing with her stronger leg (RLE). Pt understood and left with less anxiety post tx visit. Cont. POC to further reinforce safety with balance challenges.       Shira is progressing well towards her goals.     Pt prognosis is Good.     Pt will continue to benefit from skilled outpatient physical therapy to address the deficits listed in the problem list box on initial evaluation, provide pt/family education and to maximize pt's level of independence in the home and community environment.     Pt's spiritual, cultural and educational needs considered and pt agreeable to plan of care and goals.    Anticipated barriers to physical therapy: cognitive/emotional deficits    GOALS:   Short term goals: 4 weeks, pt agrees to goals set.  1. Pt to be (I) with established HEP MET  2. Pt to improve L ankle IV and EV strength scores to at least 4+/5 for improved ankle strategy with balance. MET  3. Pt to improve BLE SLS scores to at least 3 sec consistently for improved safety with standing ADL MET  4. Pt to improve GST condition 2 to at least 30 seconds for improved balance and decreased fall risk MET  5. Pt to improve GST condition 3 to at least 15 seconds for improved balance and decreased fall risk. MET  6. Pt to improve GST condition 4 to at least 8 seconds for  improved balance and decreased fall risk. MET  7. Pt to improve GST condition 6 to at least 10 seconds for improved balance and decreased fall risk. MET 03/06/19  8. Pt to improve FGA score to at least 23/30 for improved balance and decreased fall risk MET     Long term goals: 8 weeks, pt agrees to goals set  9. Pt to improve BLE SLS scores to at least 6 sec consistently for improved safety with standing ADL. Progressing  10. Pt to improve GST condition 3 to at least 20 seconds for improved balance and decreased fall risk. MET  11. Pt to improve GST condition 4 to at least 12 seconds for improved balance and decreased fall risk. MET 03/06/19  12. Pt to improve GST condition 6 to at least 20 seconds for improved balance and decreased fall risk. MET 03/06/19  13. Pt to improve FGA score to at least 26/30 for improved balance and decreased fall risk. MET 03/06/19      Plan   Plan for next session: Stair training for safety with step to gait pattern, static and dynamic balance activities outside of the parallel bars, increased focus on watching feet and maintaining line of sight when stepping on and off surfaces.    Pt to attend therapy 2 x weekly per Extended POC Certification Period: 03/06/19 to 04/03/19. Treatment to include: pt education, HEP, therapeutic exercises, neuromuscular re-education/balance exercises, therapeutic activities, joint mobilizations, and modalities PRN, to work towards established goals. PT to continue working on activities targeting SLS, tandem stance, and stair climbing. Pt may be seen by PTA to carry out plan of care.

## 2019-03-25 ENCOUNTER — CLINICAL SUPPORT (OUTPATIENT)
Dept: REHABILITATION | Facility: HOSPITAL | Age: 64
End: 2019-03-25
Attending: SPECIALIST
Payer: COMMERCIAL

## 2019-03-25 DIAGNOSIS — Z74.09 IMPAIRED FUNCTIONAL MOBILITY, BALANCE, AND ENDURANCE: ICD-10-CM

## 2019-03-25 DIAGNOSIS — F40.298 FEAR OF FALLING: ICD-10-CM

## 2019-03-25 PROCEDURE — 97110 THERAPEUTIC EXERCISES: CPT | Mod: PO

## 2019-03-25 PROCEDURE — 97112 NEUROMUSCULAR REEDUCATION: CPT | Mod: PO

## 2019-03-25 NOTE — PROGRESS NOTES
Physical Therapy Daily Treatment Note     Name: Shira Vega  Clinic Number: [id number]    Therapy Diagnosis:   Encounter Diagnoses   Name Primary?    Impaired functional mobility, balance, and endurance     Fear of falling      Physician: ДМИТРИЙ Macdonald MD    Visit Date: 3/25/2019    Physician Orders: Eval and treat  Medical Diagnosis:   Dizziness and giddiness    Personal history of (healed) traumatic fracture    Personal history of other diseases of the circulatory system     Evaluation Date: 11/20/18  Authorization Period Expiration: 01/03/2019-01/03/2020  Extended POC Certification Period: 03/06/19 to 04/03/19  Visit #/Visits authorized: 20 / 30 (20 total visits of 2019), (9 total visits in 2018)    Time In: 14:30  Time Out: 15:15  Total Billable Time: 45 min    Precautions: fall, visual deficits, standard, cognitive/emotional deficits    Subjective     Pt reports: that she attempted stepping down from her bottom step to the ground at home by herself without holding on. She reports feeling a little shaky.   PT instructed patient NOT to attempt stair descent without UE support alone do to risk of fall. Since patient appears consistently anxious with this activity, PT recommended that pt use HR at all times during stair negotiation when HR present and especially when patient is by herself. If patient wants to attempt stair negotiation without handrail, PT recommends that her  be present to provide assist if needed. Pt verbalized good understanding to PT.     HEP Compliance: Pt reports she has been compliant with her home exercise program.  Response to previous treatment: No complaints.  Functional change: No new reports.     Pain: 0/10  Location: N/A    Objective     Patient received  Shira received therapeutic exercises to develop strength, endurance, ROM, flexibility, posture and core stabilization for 15 minutes including: therapy with activities as follows:      x 10 min on upright bicycle at  "level 7 for improved CV and LE muscular endurance    3 x 30" standing B GS stretch on incline with BUE support   2 x 20 BLE standing heel raises, no UE support    Patient participated in neuromuscular re-education activities to improve: Balance, Coordination, Kinesthetic, Sense and Proprioception for 30 minutes. The following activities were included:    Wooden stair case:   X 4 trials ascend 4 steps in reciprocal pattern SBA with no UE support; descend 4 steps in NR pattern, no HR, Min A for 1st 2 trials due to shakiness of BLE, pt reports feeling anxious with this activity, pt progressed to performing descent with CGA with VCs from PT to slow down, to breathe, and to look at her feet to see where she is stepping to.     X 6 laps tandem ambulation in // bars, CGA to occ Min A; pt continues to want to grab bar to steady; VCs to decrease UE support; VCs to look at feet for improved steadiness    2 x 30" each LE in front, tandem stance, CGA, no UE support    2 x 30" each LE, SLS with alternate LE placed on large cone, CGA to Min A, occ touchdown support    2 x 20 alternating toe taps to large cone placed in front, CGA to occ Min A, occ touchdown support; VCs to slow down      Foam pad:    X 30" static stance with narrow GAYATRI, EO, CGA, no UE support   3 x 30" static stance with narrow GAYATRI, EC, CGA to occ Min A, no UE support   2 x 30" R<>L head turns with narrow GAYATRI, EO, CGA, no UE support   2 x 30" up <> down head turns with narrow GAYATRI, EO, CGA, no UE support    Home Exercises Provided and Patient Education Provided     Education provided: Pt instructed to call MD who performed her cataract surgery in fall 2018 to make sure she can safely resume spinning classes. Pt verbalized good understanding to PT  Written Home Exercises Provided: Continue current HEP. Standing heel raises, standing toe raises, supine hip abduction with t-band, standing hip extension, and standing SL marches.  See EMR under Media for exercises " provided 2/15/2019.    Assessment   Shira tolerated therapy session fairly well this afternoon. Pt continues to demonstrate highly anxious behavior with any minor perturbation to her balance which in turn causes a greater LOB reaction. This response is most noted during stair descent. Despite constant practice with this activity every therapy session, pt continues to demonstrate the same anxious behavior and requires same VCs to improve safety of activity. Therefore, recommend use of hand rail at all times when available during stair negotiation. If no hand rail present, then recommend supervision of another person for safety. Pt demonstrates same anxious, rushed behavior during tandem ambulation and during BLE SLS activities. Pt is able to demonstrate improved steadiness with VCs for improved safety, but she do not utilize these balance strategies once another balance perturbation presents.  At this time, pt's balance appears most limited by her anxious behavior associated with any minor balance challenge. Pt has demonstrated constant performance over past therapy sessions with both static and dynamic balance activities and seems to have very limited carryover of safety and balance techniques reviewed during prior sessions. Therefore, it appears that patient has maximized benefit of skilled PT intervention at this time. Plan to review safety strategies and HEP next session. Plan to d/c next session.     Shira is progressing well towards her goals.     Pt prognosis is Good.     Pt will continue to benefit from skilled outpatient physical therapy to address the deficits listed in the problem list box on initial evaluation, provide pt/family education and to maximize pt's level of independence in the home and community environment.     Pt's spiritual, cultural and educational needs considered and pt agreeable to plan of care and goals.    Anticipated barriers to physical therapy: cognitive/emotional deficits    GOALS:    Short term goals: 4 weeks, pt agrees to goals set.  1. Pt to be (I) with established HEP MET  2. Pt to improve L ankle IV and EV strength scores to at least 4+/5 for improved ankle strategy with balance. MET  3. Pt to improve BLE SLS scores to at least 3 sec consistently for improved safety with standing ADL MET  4. Pt to improve GST condition 2 to at least 30 seconds for improved balance and decreased fall risk MET  5. Pt to improve GST condition 3 to at least 15 seconds for improved balance and decreased fall risk. MET  6. Pt to improve GST condition 4 to at least 8 seconds for improved balance and decreased fall risk. MET  7. Pt to improve GST condition 6 to at least 10 seconds for improved balance and decreased fall risk. MET 03/06/19  8. Pt to improve FGA score to at least 23/30 for improved balance and decreased fall risk MET     Long term goals: 8 weeks, pt agrees to goals set  9. Pt to improve BLE SLS scores to at least 6 sec consistently for improved safety with standing ADL. Progressing  10. Pt to improve GST condition 3 to at least 20 seconds for improved balance and decreased fall risk. MET  11. Pt to improve GST condition 4 to at least 12 seconds for improved balance and decreased fall risk. MET 03/06/19  12. Pt to improve GST condition 6 to at least 20 seconds for improved balance and decreased fall risk. MET 03/06/19  13. Pt to improve FGA score to at least 26/30 for improved balance and decreased fall risk. MET 03/06/19      Plan   Plan for next session: Review of safety strategies. Review of HEP. Plan to d/c next session.     Pt to attend therapy 2 x weekly per Extended POC Certification Period: 03/06/19 to 04/03/19. Treatment to include: pt education, HEP, therapeutic exercises, neuromuscular re-education/balance exercises, therapeutic activities, joint mobilizations, and modalities PRN, to work towards established goals. PT to continue working on activities targeting SLS, tandem stance, and  stair climbing. Pt may be seen by PTA to carry out plan of care.

## 2019-03-28 NOTE — PROGRESS NOTES
"  Physical Therapy Daily Treatment Note     Name: Shira Vega  Clinic Number: [id number]    Therapy Diagnosis:   Encounter Diagnoses   Name Primary?    Impaired functional mobility, balance, and endurance     Fear of falling      Physician: ДМИТРИЙ Macdonald MD    Visit Date: 3/29/2019    Physician Orders: Eval and treat  Medical Diagnosis:   Dizziness and giddiness    Personal history of (healed) traumatic fracture    Personal history of other diseases of the circulatory system     Evaluation Date: 11/20/18  Authorization Period Expiration: 01/03/2019-01/03/2020  Extended POC Certification Period: 03/06/19 to 04/03/19  Visit #/Visits authorized: 21 / 30 (21 total visits of 2019), (9 total visits in 2018)    Time In: 14:30  Time Out: 15:15  Total Billable Time: 45 min    Precautions: fall, visual deficits, standard, cognitive/emotional deficits    Subjective     Pt reports: that she is not going to attempt stair descent without handrails without anyone present to supervise per PT's recommendations during prior session.   HEP Compliance: Pt reports she has been compliant with her home exercise program.  Response to previous treatment: No complaints.  Functional change: No new reports.     Pain: 0/10  Location: N/A    Objective     Patient received  Shira received therapeutic exercises to develop strength, endurance, ROM, flexibility, posture and core stabilization for 15 minutes including: therapy with activities as follows:      x 10 min on upright bicycle at level 7 for improved CV and LE muscular endurance    3 x 30" standing B GS stretch on incline with BUE support   2 x 20 BLE standing heel raises, no UE support    Patient participated in neuromuscular re-education activities to improve: Balance, Coordination, Kinesthetic, Sense and Proprioception for 30 minutes. The following activities were included:      Evaluation 12/24/18 01/14/09 03/06/19 03/29/19   Single Limb Stance R LE 2 sec  (<10 sec = HIGH FALL " "RISK) 7 sec  (<10 sec = HIGH FALL RISK) 8 sec  (<10 sec = HIGH FALL RISK)     with CGA 6 sec  (<10 sec = HIGH FALL RISK) 10 sec  (<10 sec = HIGH FALL RISK)   Single Limb Stance L LE 1 sec  (<10 sec = HIGH FALL RISK) 2 sec  (<10 sec = HIGH FALL RISK) 3 sec   (<10 sec = HIGH FALL RISK)     with CGA 5 sec  (<10 sec = HIGH FALL RISK) 9 sec  (<10 sec = HIGH FALL RISK)         JENNIFER SENSORY TESTING:  (P= Pass, F= Fail; note any sway; hold each position for 30")  Condition 5: (soft surface/feet together/eyes open) P  Condition 6: (soft surface/feet together/eyes closed) F, 28 sec with CGA    Wooden stair case:   X 4 trials ascend 4 steps in reciprocal pattern SBA with no UE support; descend 4 steps in NR pattern, no HR, Min A for 1st 2 trials due to shakiness of BLE, pt reports feeling anxious with this activity, pt progressed to performing descent with CGA with VCs from PT to slow down, to breathe, and to look at her feet to see where she is stepping to.     Large stair well:  Ascend <> descend 15 steps with LHR, CGA, NR pattern  X 4 trials ascend 6 steps in reciprocal pattern with LUE touchdown support, CGA; descend 6 steps in NR pattern with CGA throughout all trial c/ LUE touchdown support; pt declined to attempt trials s/UE support due to reports of feeling "unsafe and anxious".     2 x 30" each LE in front, tandem stance, CGA, no UE support    Home Exercises Provided and Patient Education Provided     Education provided: Pt instructed to call MD who performed her cataract surgery in fall 2018 to make sure she can safely resume spinning classes. Pt verbalized good understanding to PT  Written Home Exercises Provided: Continue current HEP. Standing heel raises, standing toe raises, supine hip abduction with t-band, standing hip extension, and standing SL marches.  See EMR under Media for exercises provided 2/15/2019.    PHYSICAL THERAPY DISCHARGE SUMMARY     Status Towards Goals Met: Shira has met all STGs and LTGs at " this time. Despite meeting all established goal, pt made fair overall progress with therapy. She was able to consistently perform BLE SLS for 9-10 seconds for each LE today. Patient is able to perform stair ascent and descent Mod I when allowed to use HR. Despite constant practice with stair negotiation, especially stair descent,  every therapy session, pt continues to demonstrate the same anxious behavior and requires same VCs to improve safety of activity. Attempted stair negotiation on lower region of large stairwell to review stair negotiation safety in more complex environment. Pt was unwilling to attempt stair ascent or descent without UE support despite PT and SPT present with patient. Therefore, recommend use of hand rail at all times when available during stair negotiation. If no hand rail present, then recommend supervision of another person for safety. Pt also continues to demonstrate highly anxious behavior with any minor perturbation to her balance which in turn causes a greater LOB reaction. Pt is able to demonstrate improved steadiness with VCs provided for improved safety, but she do not utilize these balance strategies once another balance perturbation presents. Pt is compliant with HEP provided. PT also encouraged patient to talk to MD about potential return to spinning class for CV endurance benefit.      GOALS:   Short term goals: 4 weeks, pt agrees to goals set.  1. Pt to be (I) with established HEP MET  2. Pt to improve L ankle IV and EV strength scores to at least 4+/5 for improved ankle strategy with balance. MET  3. Pt to improve BLE SLS scores to at least 3 sec consistently for improved safety with standing ADL MET  4. Pt to improve GST condition 2 to at least 30 seconds for improved balance and decreased fall risk MET  5. Pt to improve GST condition 3 to at least 15 seconds for improved balance and decreased fall risk. MET  6. Pt to improve GST condition 4 to at least 8 seconds for  improved balance and decreased fall risk. MET  7. Pt to improve GST condition 6 to at least 10 seconds for improved balance and decreased fall risk. MET 03/06/19  8. Pt to improve FGA score to at least 23/30 for improved balance and decreased fall risk MET     Long term goals: 8 weeks, pt agrees to goals set  9. Pt to improve BLE SLS scores to at least 6 sec consistently for improved safety with standing ADL. MET 03/29/19  10. Pt to improve GST condition 3 to at least 20 seconds for improved balance and decreased fall risk. MET  11. Pt to improve GST condition 4 to at least 12 seconds for improved balance and decreased fall risk. MET 03/06/19  12. Pt to improve GST condition 6 to at least 20 seconds for improved balance and decreased fall risk. MET 03/06/19  13. Pt to improve FGA score to at least 26/30 for improved balance and decreased fall risk. MET 03/06/19      Goals Not achieved and why:   All established goals met at this time.    Discharge reason : At this time, pt's balance appears most limited by her anxious behavior associated with any minor balance challenge. Pt has demonstrated constant performance over past therapy sessions with both static and dynamic balance activities and seems to have very limited carryover of safety and balance techniques reviewed during prior sessions. Therefore, it appears that patient has maximized benefit of skilled PT intervention at this time.    PLAN   This patient is discharged from [facility]patient [facility] Therapy Services.     Viridiana Matthew, PT  03/29/2019

## 2019-03-29 ENCOUNTER — CLINICAL SUPPORT (OUTPATIENT)
Dept: REHABILITATION | Facility: HOSPITAL | Age: 64
End: 2019-03-29
Attending: SPECIALIST
Payer: COMMERCIAL

## 2019-03-29 DIAGNOSIS — F40.298 FEAR OF FALLING: ICD-10-CM

## 2019-03-29 DIAGNOSIS — Z74.09 IMPAIRED FUNCTIONAL MOBILITY, BALANCE, AND ENDURANCE: ICD-10-CM

## 2019-03-29 PROCEDURE — 97112 NEUROMUSCULAR REEDUCATION: CPT | Mod: PO

## 2019-03-29 PROCEDURE — 97110 THERAPEUTIC EXERCISES: CPT | Mod: PO

## 2019-04-05 ENCOUNTER — OFFICE VISIT (OUTPATIENT)
Dept: OTOLARYNGOLOGY | Facility: CLINIC | Age: 64
End: 2019-04-05
Payer: COMMERCIAL

## 2019-04-05 VITALS
WEIGHT: 126.31 LBS | DIASTOLIC BLOOD PRESSURE: 83 MMHG | HEIGHT: 63 IN | SYSTOLIC BLOOD PRESSURE: 151 MMHG | HEART RATE: 60 BPM | BODY MASS INDEX: 22.38 KG/M2

## 2019-04-05 DIAGNOSIS — Z74.09 IMPAIRED FUNCTIONAL MOBILITY, BALANCE, AND ENDURANCE: Primary | ICD-10-CM

## 2019-04-05 DIAGNOSIS — Z87.81 HISTORY OF SKULL FRACTURE: ICD-10-CM

## 2019-04-05 DIAGNOSIS — Z86.79 HISTORY OF INTRACRANIAL HEMORRHAGE: ICD-10-CM

## 2019-04-05 DIAGNOSIS — K21.9 LARYNGOPHARYNGEAL REFLUX (LPR): ICD-10-CM

## 2019-04-05 DIAGNOSIS — R09.89 CHRONIC THROAT CLEARING: ICD-10-CM

## 2019-04-05 DIAGNOSIS — J30.9 ALLERGIC RHINITIS, UNSPECIFIED SEASONALITY, UNSPECIFIED TRIGGER: ICD-10-CM

## 2019-04-05 DIAGNOSIS — J34.2 NASAL SEPTAL DEVIATION: ICD-10-CM

## 2019-04-05 PROBLEM — R13.10 DYSPHAGIA: Status: RESOLVED | Noted: 2018-02-12 | Resolved: 2019-04-05

## 2019-04-05 PROCEDURE — 1101F PT FALLS ASSESS-DOCD LE1/YR: CPT | Mod: CPTII,S$GLB,, | Performed by: SPECIALIST

## 2019-04-05 PROCEDURE — 1101F PR PT FALLS ASSESS DOC 0-1 FALLS W/OUT INJ PAST YR: ICD-10-PCS | Mod: CPTII,S$GLB,, | Performed by: SPECIALIST

## 2019-04-05 PROCEDURE — 3079F PR MOST RECENT DIASTOLIC BLOOD PRESSURE 80-89 MM HG: ICD-10-PCS | Mod: CPTII,S$GLB,, | Performed by: SPECIALIST

## 2019-04-05 PROCEDURE — 3079F DIAST BP 80-89 MM HG: CPT | Mod: CPTII,S$GLB,, | Performed by: SPECIALIST

## 2019-04-05 PROCEDURE — 3008F PR BODY MASS INDEX (BMI) DOCUMENTED: ICD-10-PCS | Mod: CPTII,S$GLB,, | Performed by: SPECIALIST

## 2019-04-05 PROCEDURE — 99214 PR OFFICE/OUTPT VISIT, EST, LEVL IV, 30-39 MIN: ICD-10-PCS | Mod: S$GLB,,, | Performed by: SPECIALIST

## 2019-04-05 PROCEDURE — 3008F BODY MASS INDEX DOCD: CPT | Mod: CPTII,S$GLB,, | Performed by: SPECIALIST

## 2019-04-05 PROCEDURE — 99214 OFFICE O/P EST MOD 30 MIN: CPT | Mod: S$GLB,,, | Performed by: SPECIALIST

## 2019-04-05 PROCEDURE — 3077F SYST BP >= 140 MM HG: CPT | Mod: CPTII,S$GLB,, | Performed by: SPECIALIST

## 2019-04-05 PROCEDURE — 3077F PR MOST RECENT SYSTOLIC BLOOD PRESSURE >= 140 MM HG: ICD-10-PCS | Mod: CPTII,S$GLB,, | Performed by: SPECIALIST

## 2019-04-05 NOTE — PROGRESS NOTES
Subjective:       Patient ID: Shira Vega is a 63 y.o. female.    Chief Complaint: Follow-up (2 month)    The patient is returning for a follow-up appointment.  1.  With regard to her chronic imbalance and she has finished with the 2nd round of vestibular rehab therapy.  She feels like she received much more benefit from this course of therapy.  Her balance is stable in general.  She has uncertain D and feeling that she is going to fall when she walks down and inclined leading with her left foot.  She has placed hand railing on all the stairs at her home.  2.  With regard to her laryngopharyngeal reflux she still has some throat clearing and coughing, but overall, has improved significantly.  She continues take her Zantac twice daily.  The phlegm in the throat has diminished.  She is not choking on foods.  She does feel that her voice has strength and significantly    Review of Systems   Constitutional: Positive for fatigue. Negative for activity change, appetite change, chills, fever and unexpected weight change.   HENT: Positive for congestion, ear pain, postnasal drip, rhinorrhea, sore throat, trouble swallowing and voice change. Negative for ear discharge, facial swelling, hearing loss, mouth sores, sinus pressure, sinus pain, sneezing and tinnitus.    Eyes: Negative for photophobia, pain, discharge, redness, itching and visual disturbance.   Respiratory: Positive for cough. Negative for apnea, choking, shortness of breath and wheezing.    Cardiovascular: Negative for chest pain and palpitations.   Gastrointestinal: Negative for abdominal distention, abdominal pain, nausea and vomiting.   Musculoskeletal: Negative for arthralgias, myalgias, neck pain and neck stiffness.   Skin: Negative.  Negative for color change, pallor and rash.   Allergic/Immunologic: Positive for environmental allergies. Negative for food allergies and immunocompromised state.   Neurological: Positive for dizziness, light-headedness and  headaches. Negative for facial asymmetry, speech difficulty, weakness and numbness.   Hematological: Negative for adenopathy. Does not bruise/bleed easily.   Psychiatric/Behavioral: Negative for confusion, decreased concentration and sleep disturbance.       Objective:      Physical Exam   Constitutional: She is oriented to person, place, and time. She appears well-developed and well-nourished. She is cooperative.   HENT:   Head: Normocephalic.   Right Ear: External ear and ear canal normal. Tympanic membrane is retracted.   Left Ear: External ear and ear canal normal. Tympanic membrane is retracted.   Nose: Mucosal edema (cyanotic, boggy inferior turbinates bilaterally), rhinorrhea (clear mucus bilaterallyTo the left) and septal deviation present.   Mouth/Throat: Uvula is midline, oropharynx is clear and moist and mucous membranes are normal. No oral lesions.   Eyes: Pupils are equal, round, and reactive to light. EOM and lids are normal. Right eye exhibits no discharge and no exudate. Left eye exhibits no discharge and no exudate. Right conjunctiva is injected. Left conjunctiva is injected.   Neck: Trachea normal and normal range of motion. No muscular tenderness present. No tracheal deviation present. No thyroid mass and no thyromegaly present.   Cardiovascular: Normal rate, regular rhythm, normal heart sounds and normal pulses.   Pulmonary/Chest: Effort normal and breath sounds normal. No stridor. She has no decreased breath sounds. She has no wheezes. She has no rhonchi. She has no rales.   Abdominal: Soft. Bowel sounds are normal. There is no tenderness.   Musculoskeletal: Normal range of motion.   Lymphadenopathy:        Head (right side): No submental, no submandibular, no preauricular, no posterior auricular and no occipital adenopathy present.        Head (left side): No submental, no submandibular, no preauricular, no posterior auricular and no occipital adenopathy present.     She has no cervical  adenopathy.   Neurological: She is alert and oriented to person, place, and time. She has normal strength. No cranial nerve deficit or sensory deficit. Gait normal.   Neuro otologic-no nystagmus, cranial nerves intact, minimally wide-based gait (significant improvement from last exam)   Skin: Skin is warm and dry. No petechiae and no rash noted. No cyanosis. Nails show no clubbing.   Psychiatric: She has a normal mood and affect. Her speech is normal and behavior is normal. Judgment and thought content normal. Cognition and memory are normal.       Assessment:       1. Impaired functional mobility, balance, and endurance    2. Allergic rhinitis, unspecified seasonality, unspecified trigger    3. Laryngopharyngeal reflux (LPR)    4. Chronic throat clearing    5. Nasal septal deviation    6. History of skull fracture    7. History of intracranial hemorrhage        Plan:       I will have the patient continue with her current drug regimen.  I am advising that she have hand rales placed at any stairs in her home.  I will recheck her in 2 months, or sooner on an as-needed basis.

## 2019-04-09 ENCOUNTER — PATIENT MESSAGE (OUTPATIENT)
Dept: OTOLARYNGOLOGY | Facility: CLINIC | Age: 64
End: 2019-04-09

## 2019-04-10 ENCOUNTER — TELEPHONE (OUTPATIENT)
Dept: OTOLARYNGOLOGY | Facility: CLINIC | Age: 64
End: 2019-04-10

## 2019-05-21 ENCOUNTER — PATIENT MESSAGE (OUTPATIENT)
Dept: OTOLARYNGOLOGY | Facility: CLINIC | Age: 64
End: 2019-05-21

## 2019-06-06 ENCOUNTER — OFFICE VISIT (OUTPATIENT)
Dept: OTOLARYNGOLOGY | Facility: CLINIC | Age: 64
End: 2019-06-06
Payer: COMMERCIAL

## 2019-06-06 VITALS
BODY MASS INDEX: 22.56 KG/M2 | WEIGHT: 127.31 LBS | TEMPERATURE: 98 F | HEART RATE: 58 BPM | HEIGHT: 63 IN | SYSTOLIC BLOOD PRESSURE: 118 MMHG | DIASTOLIC BLOOD PRESSURE: 76 MMHG

## 2019-06-06 DIAGNOSIS — K21.9 LARYNGOPHARYNGEAL REFLUX (LPR): ICD-10-CM

## 2019-06-06 DIAGNOSIS — R09.89 CHRONIC THROAT CLEARING: ICD-10-CM

## 2019-06-06 DIAGNOSIS — Z74.09 IMPAIRED FUNCTIONAL MOBILITY, BALANCE, AND ENDURANCE: Primary | ICD-10-CM

## 2019-06-06 DIAGNOSIS — J34.2 NASAL SEPTAL DEVIATION: ICD-10-CM

## 2019-06-06 DIAGNOSIS — J30.0 VASOMOTOR RHINITIS: ICD-10-CM

## 2019-06-06 DIAGNOSIS — J30.9 ALLERGIC RHINITIS, UNSPECIFIED SEASONALITY, UNSPECIFIED TRIGGER: ICD-10-CM

## 2019-06-06 PROCEDURE — 1101F PR PT FALLS ASSESS DOC 0-1 FALLS W/OUT INJ PAST YR: ICD-10-PCS | Mod: CPTII,S$GLB,, | Performed by: SPECIALIST

## 2019-06-06 PROCEDURE — 3008F BODY MASS INDEX DOCD: CPT | Mod: CPTII,S$GLB,, | Performed by: SPECIALIST

## 2019-06-06 PROCEDURE — 3008F PR BODY MASS INDEX (BMI) DOCUMENTED: ICD-10-PCS | Mod: CPTII,S$GLB,, | Performed by: SPECIALIST

## 2019-06-06 PROCEDURE — 3078F DIAST BP <80 MM HG: CPT | Mod: CPTII,S$GLB,, | Performed by: SPECIALIST

## 2019-06-06 PROCEDURE — 3074F SYST BP LT 130 MM HG: CPT | Mod: CPTII,S$GLB,, | Performed by: SPECIALIST

## 2019-06-06 PROCEDURE — 99214 PR OFFICE/OUTPT VISIT, EST, LEVL IV, 30-39 MIN: ICD-10-PCS | Mod: S$GLB,,, | Performed by: SPECIALIST

## 2019-06-06 PROCEDURE — 1101F PT FALLS ASSESS-DOCD LE1/YR: CPT | Mod: CPTII,S$GLB,, | Performed by: SPECIALIST

## 2019-06-06 PROCEDURE — 3078F PR MOST RECENT DIASTOLIC BLOOD PRESSURE < 80 MM HG: ICD-10-PCS | Mod: CPTII,S$GLB,, | Performed by: SPECIALIST

## 2019-06-06 PROCEDURE — 3074F PR MOST RECENT SYSTOLIC BLOOD PRESSURE < 130 MM HG: ICD-10-PCS | Mod: CPTII,S$GLB,, | Performed by: SPECIALIST

## 2019-06-06 PROCEDURE — 99214 OFFICE O/P EST MOD 30 MIN: CPT | Mod: S$GLB,,, | Performed by: SPECIALIST

## 2019-06-06 RX ORDER — LORATADINE 10 MG/1
10 TABLET ORAL DAILY
Qty: 30 TABLET | Refills: 11
Start: 2019-06-06 | End: 2021-10-04

## 2019-06-06 RX ORDER — FLUTICASONE PROPIONATE 50 MCG
2 SPRAY, SUSPENSION (ML) NASAL DAILY
Qty: 1 BOTTLE | Refills: 11 | Status: SHIPPED | OUTPATIENT
Start: 2019-06-06

## 2019-06-06 NOTE — PROGRESS NOTES
Subjective:       Patient ID: Shira Vega is a 63 y.o. female.    Chief Complaint: Follow-up (with slight cough)    The patient is coming in for a follow-up visit.  For the last 2 weeks she has noticed mild raspiness of her voice and slight in crease in throat clearing.  She has been taking her ranatidine twice daily.  Prior to that symptoms were significantly decreased.  She has had no further choking episodes.    With regard to her balance issues symptoms have stabilize fairly well.  She continues to feel unsteady when walking down steps, so she holds onto railing whenever she walks down steps.    She is using ipratropium bromide before eating with good control of her vasomotor rhinitis.    Review of Systems   Constitutional: Positive for fatigue. Negative for activity change, appetite change, chills, fever and unexpected weight change.   HENT: Positive for congestion, postnasal drip, rhinorrhea, trouble swallowing and voice change. Negative for ear discharge, ear pain, facial swelling, hearing loss, mouth sores, sinus pressure, sinus pain, sneezing, sore throat (Chronic throat clearing) and tinnitus.    Eyes: Negative for photophobia, pain, discharge, redness, itching and visual disturbance.   Respiratory: Positive for cough and shortness of breath. Negative for apnea, choking and wheezing.    Cardiovascular: Negative for chest pain and palpitations.   Gastrointestinal: Negative for abdominal distention, abdominal pain, nausea and vomiting.   Musculoskeletal: Negative for arthralgias, myalgias, neck pain and neck stiffness.   Skin: Negative.  Negative for color change, pallor and rash.   Allergic/Immunologic: Positive for environmental allergies. Negative for food allergies and immunocompromised state.   Neurological: Positive for headaches. Negative for dizziness, facial asymmetry, speech difficulty, weakness, light-headedness and numbness.   Hematological: Negative for adenopathy. Does not bruise/bleed  easily.   Psychiatric/Behavioral: Negative for confusion, decreased concentration and sleep disturbance.       Objective:      Physical Exam   Constitutional: She is oriented to person, place, and time. She appears well-developed and well-nourished. She is cooperative.   HENT:   Head: Normocephalic.   Right Ear: External ear and ear canal normal. Tympanic membrane is retracted.   Left Ear: External ear and ear canal normal. Tympanic membrane is retracted.   Nose: Mucosal edema (cyanotic, boggy inferior turbinates bilaterally), rhinorrhea (clear mucus bilaterally) and septal deviation (To the left) present.   Mouth/Throat: Uvula is midline, oropharynx is clear and moist and mucous membranes are normal. No oral lesions.   Voice-minimal raspiness   Eyes: Pupils are equal, round, and reactive to light. EOM and lids are normal. Right eye exhibits no discharge and no exudate. Left eye exhibits no discharge and no exudate. Right conjunctiva is injected. Left conjunctiva is injected.   Neck: Trachea normal and normal range of motion. No muscular tenderness present. No tracheal deviation present. No thyroid mass and no thyromegaly present.   Cardiovascular: Normal rate, regular rhythm, normal heart sounds and normal pulses.   Pulmonary/Chest: Effort normal and breath sounds normal. No stridor. She has no decreased breath sounds. She has no wheezes. She has no rhonchi. She has no rales.   Abdominal: Soft. Bowel sounds are normal. There is no tenderness.   Musculoskeletal: Normal range of motion.   Lymphadenopathy:        Head (right side): No submental, no submandibular, no preauricular, no posterior auricular and no occipital adenopathy present.        Head (left side): No submental, no submandibular, no preauricular, no posterior auricular and no occipital adenopathy present.     She has no cervical adenopathy.   Neurological: She is alert and oriented to person, place, and time. She has normal strength. No cranial nerve  deficit or sensory deficit. Gait normal.   Skin: Skin is warm and dry. No petechiae and no rash noted. No cyanosis. Nails show no clubbing.   Psychiatric: She has a normal mood and affect. Her speech is normal and behavior is normal. Judgment and thought content normal. Cognition and memory are normal.       Assessment:       1. Impaired functional mobility, balance, and endurance    2. Allergic rhinitis, unspecified seasonality, unspecified trigger    3. Laryngopharyngeal reflux (LPR)    4. Nasal septal deviation    5. Chronic throat clearing    6. Vasomotor rhinitis        Plan:       I will have the patient use Flonase on a daily basis and supplement with Claritin as needed to control runny nose or postnasal drip.  She currently also uses ipratropium bromide as needed.  I will recheck her in 4 weeks.  At this point she will continue with her home exercises for vestibular rehabilitative therapy.  We did discuss repeat referral to vestibular rehab therapy as an option.

## 2019-07-03 ENCOUNTER — PATIENT OUTREACH (OUTPATIENT)
Dept: ADMINISTRATIVE | Facility: OTHER | Age: 64
End: 2019-07-03

## 2019-07-09 ENCOUNTER — OFFICE VISIT (OUTPATIENT)
Dept: OTOLARYNGOLOGY | Facility: CLINIC | Age: 64
End: 2019-07-09
Payer: COMMERCIAL

## 2019-07-09 VITALS
BODY MASS INDEX: 22.5 KG/M2 | HEART RATE: 61 BPM | DIASTOLIC BLOOD PRESSURE: 81 MMHG | WEIGHT: 127 LBS | HEIGHT: 63 IN | SYSTOLIC BLOOD PRESSURE: 121 MMHG

## 2019-07-09 DIAGNOSIS — R51.9 NONINTRACTABLE EPISODIC HEADACHE, UNSPECIFIED HEADACHE TYPE: ICD-10-CM

## 2019-07-09 DIAGNOSIS — Z86.79 HISTORY OF INTRACRANIAL HEMORRHAGE: Primary | ICD-10-CM

## 2019-07-09 DIAGNOSIS — K21.9 LARYNGOPHARYNGEAL REFLUX (LPR): ICD-10-CM

## 2019-07-09 DIAGNOSIS — R13.10 DYSPHAGIA, UNSPECIFIED TYPE: ICD-10-CM

## 2019-07-09 DIAGNOSIS — Z74.09 IMPAIRED FUNCTIONAL MOBILITY, BALANCE, AND ENDURANCE: ICD-10-CM

## 2019-07-09 DIAGNOSIS — J34.2 NASAL SEPTAL DEVIATION: ICD-10-CM

## 2019-07-09 DIAGNOSIS — J31.0 CHRONIC RHINITIS: ICD-10-CM

## 2019-07-09 DIAGNOSIS — J30.9 ALLERGIC RHINITIS, UNSPECIFIED SEASONALITY, UNSPECIFIED TRIGGER: ICD-10-CM

## 2019-07-09 PROCEDURE — 99214 PR OFFICE/OUTPT VISIT, EST, LEVL IV, 30-39 MIN: ICD-10-PCS | Mod: 25,S$GLB,, | Performed by: SPECIALIST

## 2019-07-09 PROCEDURE — 3079F PR MOST RECENT DIASTOLIC BLOOD PRESSURE 80-89 MM HG: ICD-10-PCS | Mod: CPTII,S$GLB,, | Performed by: SPECIALIST

## 2019-07-09 PROCEDURE — 31575 DIAGNOSTIC LARYNGOSCOPY: CPT | Mod: S$GLB,,, | Performed by: SPECIALIST

## 2019-07-09 PROCEDURE — 3074F SYST BP LT 130 MM HG: CPT | Mod: CPTII,S$GLB,, | Performed by: SPECIALIST

## 2019-07-09 PROCEDURE — 3074F PR MOST RECENT SYSTOLIC BLOOD PRESSURE < 130 MM HG: ICD-10-PCS | Mod: CPTII,S$GLB,, | Performed by: SPECIALIST

## 2019-07-09 PROCEDURE — 3079F DIAST BP 80-89 MM HG: CPT | Mod: CPTII,S$GLB,, | Performed by: SPECIALIST

## 2019-07-09 PROCEDURE — 99214 OFFICE O/P EST MOD 30 MIN: CPT | Mod: 25,S$GLB,, | Performed by: SPECIALIST

## 2019-07-09 PROCEDURE — 3008F BODY MASS INDEX DOCD: CPT | Mod: CPTII,S$GLB,, | Performed by: SPECIALIST

## 2019-07-09 PROCEDURE — 1101F PR PT FALLS ASSESS DOC 0-1 FALLS W/OUT INJ PAST YR: ICD-10-PCS | Mod: CPTII,S$GLB,, | Performed by: SPECIALIST

## 2019-07-09 PROCEDURE — 31575 LARYNGOSCOPY: ICD-10-PCS | Mod: S$GLB,,, | Performed by: SPECIALIST

## 2019-07-09 PROCEDURE — 1101F PT FALLS ASSESS-DOCD LE1/YR: CPT | Mod: CPTII,S$GLB,, | Performed by: SPECIALIST

## 2019-07-09 PROCEDURE — 3008F PR BODY MASS INDEX (BMI) DOCUMENTED: ICD-10-PCS | Mod: CPTII,S$GLB,, | Performed by: SPECIALIST

## 2019-07-09 NOTE — PROGRESS NOTES
Subjective:       Patient ID: Shira Vega is a 63 y.o. female.    Chief Complaint: Sinus Problem and Follow-up    The patient is returning for a follow-up evaluation.  There are multiple issues to discuss:  1.  She continues to have a runny nose whenever she eats.  Symptoms are controlled if she pre treated with the ipratropium bromide nasal spray.  2.  She has stopped going to vestibular rehab therapy, both because of financial reasons and because she felt embarrassed by having significantly lower level of neurologic issues than most of the people in therapy..  She is continuing to do some exercises at home.  3.  With regard to her laryngopharyngeal reflux she still does have some throat clearing and periodic dysphagia.  4.  With respect to her allergic rhinitis she is not having any symptoms currently.  She has Claritin and Flonase to use to control symptoms when they occur.  5.  The patient is no longer having headaches.      Review of Systems   Constitutional: Positive for fatigue. Negative for activity change, appetite change, chills, fever and unexpected weight change.   HENT: Positive for congestion, ear pain, postnasal drip, rhinorrhea, sore throat and trouble swallowing. Negative for ear discharge, facial swelling, hearing loss, mouth sores, sinus pressure, sinus pain, sneezing, tinnitus and voice change.    Eyes: Negative for photophobia, pain, discharge, redness, itching and visual disturbance.   Respiratory: Positive for cough. Negative for apnea, choking, shortness of breath and wheezing.    Cardiovascular: Negative for chest pain and palpitations.   Gastrointestinal: Negative for abdominal distention, abdominal pain, nausea and vomiting.   Musculoskeletal: Positive for gait problem. Negative for arthralgias, myalgias, neck pain and neck stiffness.   Skin: Negative.  Negative for color change, pallor and rash.   Allergic/Immunologic: Positive for environmental allergies. Negative for food allergies  and immunocompromised state.   Neurological: Positive for dizziness, light-headedness and headaches. Negative for facial asymmetry, speech difficulty, weakness and numbness.   Hematological: Negative for adenopathy. Does not bruise/bleed easily.   Psychiatric/Behavioral: Negative for confusion, decreased concentration and sleep disturbance.       Objective:      Physical Exam   Constitutional: She is oriented to person, place, and time. She appears well-developed and well-nourished. She is cooperative.   HENT:   Head: Normocephalic.   Right Ear: External ear and ear canal normal. Tympanic membrane is retracted.   Left Ear: External ear and ear canal normal. Tympanic membrane is retracted.   Nose: Mucosal edema (cyanotic, boggy inferior turbinates bilaterally), rhinorrhea (clear mucus bilaterally) and septal deviation (To the left) present.   Mouth/Throat: Uvula is midline, oropharynx is clear and moist and mucous membranes are normal. No oral lesions.   Eyes: Pupils are equal, round, and reactive to light. EOM and lids are normal. Right eye exhibits no discharge and no exudate. Left eye exhibits no discharge and no exudate. Right conjunctiva is injected. Left conjunctiva is injected.   Neck: Trachea normal and normal range of motion. No muscular tenderness present. No tracheal deviation present. No thyroid mass and no thyromegaly present.   Cardiovascular: Normal rate, regular rhythm, normal heart sounds and normal pulses.   Pulmonary/Chest: Effort normal and breath sounds normal. No stridor. She has no decreased breath sounds. She has no wheezes. She has no rhonchi. She has no rales.   Abdominal: Soft. Bowel sounds are normal. There is no tenderness.   Musculoskeletal: Normal range of motion.   Lymphadenopathy:        Head (right side): No submental, no submandibular, no preauricular, no posterior auricular and no occipital adenopathy present.        Head (left side): No submental, no submandibular, no  preauricular, no posterior auricular and no occipital adenopathy present.     She has no cervical adenopathy.   Neurological: She is alert and oriented to person, place, and time. She has normal strength. No cranial nerve deficit or sensory deficit. Gait normal.   Neuro otologic-no nystagmus, cranial nerves intact, mildly wide-based gait, Romberg unsteady, tandem Romberg falls to the left with eyes open   Skin: Skin is warm and dry. No petechiae and no rash noted. No cyanosis. Nails show no clubbing.   Psychiatric: She has a normal mood and affect. Her speech is normal and behavior is normal. Judgment and thought content normal. Cognition and memory are normal.       Flexible video nasolaryngoscopy-septal deviation to the left, nasal changes of allergic rhinitis; laryngeal changes consistent with laryngopharyngeal reflex with mild to moderate edema and mild erythema of the arytenoids and interarytenoid mucosa, improved from last endoscopy      Assessment:       1. History of intracranial hemorrhage    2. Impaired functional mobility, balance, and endurance    3. Laryngopharyngeal reflux (LPR)    4. Dysphagia, unspecified type    5. Allergic rhinitis, unspecified seasonality, unspecified trigger    6. Nonintractable episodic headache, unspecified headache type       PLAN:    I will try switching the patient to once a day evening dose of Zantac.  She should continue doing her vestibular rehabilitative therapy exercises at home.  I will recheck her in 3 months, or sooner on an as-needed basis.

## 2019-07-09 NOTE — PROCEDURES
"Laryngoscopy  Date/Time: 7/9/2019 10:10 AM  Performed by: ДМИТРИЙ Macdonald MD  Authorized by: ДМИТРИЙ Macdonald MD     Time out: Immediately prior to procedure a "time out" was called to verify the correct patient, procedure, equipment, support staff and site/side marked as required.    Consent Done?:  Yes (Verbal)  Anesthesia:     Local anesthetic:  Lidocaine 2% and Juan-Synephrine 1/2% (Topical aerosol)    Patient tolerance:  Patient tolerated the procedure well with no immediate complications    Decongestion performed?: Yes    Laryngoscopy:     Areas examined:  Nasal cavities, nasopharynx, oropharynx, hypopharynx, larynx and vocal cords    Laryngoscope size:  4 mm (Flexible video nasolaryngoscopy)  Nose External:      No external nasal deformity  Nose Intranasal:      Mucosa no polyps     Mucosa ulcers not present     No mucosa lesions present (Clear mucus in the nasal passages bilaterally)     Septum gross deformity ( septum deviated to the left)     Enlarged turbinates ( inferior turbinates enlarged bilaterally)  Nasopharynx:      No mucosa lesions     Adenoids not present     Posterior choanae patent     Eustachian tube patent  Larynx/hypopharynx:      No epiglottis lesions     No epiglottis edema     No AE folds lesions     No vocal cord polyps     Equal and normal bilateral ( vocal cords move symmetrically, no lesionsnoted)     No hypopharynx lesions     No piriform sinus pooling     No piriform sinus lesions     Post cricoid edema ( mild to moderate, improved from last visit)     Post cricoid erythema ( mild, improved from last endoscopy minimally)     Flexible nasolaryngoscopy-nasal septal deviation nasal changes of allergic rhinitis; laryngeal changes of laryngopharyngeal reflux that is responding to H2 agonist therapy      "

## 2019-08-06 ENCOUNTER — TELEPHONE (OUTPATIENT)
Dept: FAMILY MEDICINE | Facility: CLINIC | Age: 64
End: 2019-08-06

## 2019-08-06 DIAGNOSIS — Z12.39 BREAST CANCER SCREENING: Primary | ICD-10-CM

## 2019-08-06 NOTE — TELEPHONE ENCOUNTER
----- Message from Jackie Houston sent at 8/6/2019  3:16 PM CDT -----  Contact: Pt  Please enter patient mammogram orders.

## 2019-08-07 RX ORDER — IPRATROPIUM BROMIDE 21 UG/1
SPRAY, METERED NASAL
Qty: 30 ML | Refills: 0 | Status: SHIPPED | OUTPATIENT
Start: 2019-08-07 | End: 2023-04-04

## 2019-08-14 ENCOUNTER — HOSPITAL ENCOUNTER (OUTPATIENT)
Dept: RADIOLOGY | Facility: HOSPITAL | Age: 64
Discharge: HOME | End: 2019-08-14
Attending: FAMILY MEDICINE
Payer: COMMERCIAL

## 2019-08-14 DIAGNOSIS — Z12.39 BREAST CANCER SCREENING: ICD-10-CM

## 2019-08-14 PROCEDURE — 77063 MAMMO DIGITAL SCREENING BILAT WITH TOMOSYNTHESIS_CAD: ICD-10-PCS | Mod: 26,,, | Performed by: RADIOLOGY

## 2019-08-14 PROCEDURE — 77067 SCR MAMMO BI INCL CAD: CPT | Mod: TC,PO

## 2019-08-14 PROCEDURE — 77067 SCR MAMMO BI INCL CAD: CPT | Mod: 26,,, | Performed by: RADIOLOGY

## 2019-08-14 PROCEDURE — 77063 BREAST TOMOSYNTHESIS BI: CPT | Mod: 26,,, | Performed by: RADIOLOGY

## 2019-08-14 PROCEDURE — 77067 MAMMO DIGITAL SCREENING BILAT WITH TOMOSYNTHESIS_CAD: ICD-10-PCS | Mod: 26,,, | Performed by: RADIOLOGY

## 2019-08-28 ENCOUNTER — PATIENT MESSAGE (OUTPATIENT)
Dept: OTOLARYNGOLOGY | Facility: CLINIC | Age: 64
End: 2019-08-28

## 2019-09-28 RX ORDER — PRAVASTATIN SODIUM 20 MG/1
TABLET ORAL
Qty: 90 TABLET | Refills: 0 | Status: SHIPPED | OUTPATIENT
Start: 2019-09-28 | End: 2019-12-27

## 2019-09-28 RX ORDER — FOLIC ACID 1 MG/1
TABLET ORAL
Refills: 3 | COMMUNITY
Start: 2019-08-02

## 2019-09-28 NOTE — TELEPHONE ENCOUNTER
Please contact patient to schedule return visit before the end of this calendar year.  Last visit September, 2018.

## 2019-09-30 NOTE — TELEPHONE ENCOUNTER
Attempted to contact the patient to schedule appt with Dr. Barragan. No answer. Will try contacting again.

## 2019-10-03 ENCOUNTER — PATIENT MESSAGE (OUTPATIENT)
Dept: ENDOCRINOLOGY | Facility: CLINIC | Age: 64
End: 2019-10-03

## 2019-10-04 RX ORDER — BISOPROLOL FUMARATE 10 MG/1
TABLET, FILM COATED ORAL
Qty: 30 TABLET | Refills: 0 | OUTPATIENT
Start: 2019-10-04

## 2019-10-07 ENCOUNTER — PATIENT MESSAGE (OUTPATIENT)
Dept: OTOLARYNGOLOGY | Facility: CLINIC | Age: 64
End: 2019-10-07

## 2019-10-07 PROBLEM — S00.93XA HEAD CONTUSION: Status: RESOLVED | Noted: 2018-02-12 | Resolved: 2019-10-07

## 2019-10-07 NOTE — PROGRESS NOTES
Subjective:       Patient ID: Shira Vega is a 64 y.o. female.    Chief Complaint: Annual Exam    HPI    The patient presents to the office today requesting a routine periodic health examination.    Patient Active Problem List   Diagnosis    HTN (hypertension), benign    Hyperlipidemia    Hypothyroidism    History of skull fracture    History of intracranial hemorrhage    [place] cardiac risk <10% in next 10 years    Hypercalcemia    Vitamin D deficiency    Laryngopharyngeal reflux (LPR)    Allergic rhinitis    Nasal septal deviation    Nonintractable episodic headache    Impaired functional mobility, balance, and endurance    Fear of falling       Past Surgical History:   Procedure Laterality Date    CATARACT EXTRACTION      FOOT SURGERY      WISDOM TOOTH EXTRACTION           Current Outpatient Medications:     aspirin (ASPIRIN LOW DOSE) 81 MG EC tablet, Take 1 tablet by mouth., Disp: , Rfl:     bisoprolol (ZEBETA) 10 MG tablet, Take 10 mg by mouth once daily., Disp: , Rfl:     cholecalciferol, vitamin D3, 1,000 unit capsule, Take 1 capsule (1,000 Units total) by mouth once daily., Disp: , Rfl: 0    fluticasone propionate (FLONASE) 50 mcg/actuation nasal spray, 2 sprays (100 mcg total) by Each Nare route once daily., Disp: 1 Bottle, Rfl: 11    folic acid (FOLVITE) 1 MG tablet, TK 1 T PO QD, Disp: , Rfl: 3    ipratropium (ATROVENT) 0.03 % nasal spray, INSTILL 2 SPRAYS INTO EACH NOSTRIL BEFORE GOING TO BEDTIME EVERY NIGHT AND 1 TO 2 SPRAYS INTO EACH NOSTRIL PRIOR TO EATING, Disp: 30 mL, Rfl: 0    levothyroxine (SYNTHROID) 50 MCG tablet, take 1 tablet by mouth every morning ON AN EMPTY STOMACH, Disp: 90 tablet, Rfl: 2    loratadine (CLARITIN) 10 mg tablet, Take 1 tablet (10 mg total) by mouth once daily., Disp: 30 tablet, Rfl: 11    pravastatin (PRAVACHOL) 20 MG tablet, TAKE 1 TABLET(20 MG) BY MOUTH EVERY DAY, Disp: 90 tablet, Rfl: 0    ranitidine (ZANTAC) 300 MG tablet, Take 1 tablet  (300 mg total) by mouth every evening., Disp: 30 tablet, Rfl: 11    VIMPAT 150 mg Tab, 2 (two) times daily. , Disp: , Rfl: 0    FLUZONE HIGH-DOSE , PF, 180 mcg/0.5 mL vaccine, ADM 0.5ML IM UTD, Disp: , Rfl: 0    PREVNAR 13, PF, 0.5 mL Syrg, ADM 0.5ML IM UTD, Disp: , Rfl: 0    Review of patient's allergies indicates:   Allergen Reactions    No known allergies        Family History   Problem Relation Age of Onset    Hypertension Mother     Thyroid disease Mother     Lung cancer Mother     Osteoarthritis Maternal Grandmother     Hypertension Maternal Grandmother     Colon cancer Maternal Grandfather        Social History     Socioeconomic History    Marital status:      Spouse name: Not on file    Number of children: 1    Years of education: Not on file    Highest education level: Not on file   Occupational History     Employer: [employer]     Comment: retired   Social Needs    Financial resource strain: Not hard at all    Food insecurity:     Worry: Never true     Inability: Never true    Transportation needs:     Medical: No     Non-medical: No   Tobacco Use    Smoking status: Former Smoker     Types: Cigarettes     Last attempt to quit: 1998     Years since quittin.2    Smokeless tobacco: Never Used    Tobacco comment: smoked when in college, a couple cigs. when she went out   Substance and Sexual Activity    Alcohol use: Yes     Alcohol/week: 5.8 standard drinks     Types: 7 Standard drinks or equivalent per week     Comment: drinks glass of wine or cocktail in the evening    Drug use: No    Sexual activity: Yes     Partners: Male     Comment:    Lifestyle    Physical activity:     Days per week: 3 days     Minutes per session: 30 min    Stress: Only a little   Relationships    Social connections:     Talks on phone: More than three times a week     Gets together: Twice a week     Attends Mosque service: Never     Active member of club or organization:  Yes     Attends meetings of clubs or organizations: More than 4 times per year     Relationship status:    Other Topics Concern    Not on file   Social History Narrative    Rates diet as good.      She is not satisfied with weight.     with 1 child    She does drink at least 1/2 gallon water daily.    She drinks 0 coffee/tea/caffeine-containing soft drinks daily.    Total sleep time at night is 8-10 hours.    She no longer work- retired.    She does wear seat belts.    Hobbies include none.               OB History        1    Para   1    Term   1            AB        Living           SAB        TAB        Ectopic        Multiple        Live Births               Obstetric Comments   Menarche age 11.  LMP age 53.  First child born age 33.  History of abnormal PAP smear: NO.  History of abnormal mammogram: NO.  History of sexually transmitted disease:  NO    Breast Cancer Risk:  5-year Risk: Patient 2.1%, Average1.9%  Lifetime Risk: P atient 9.4%, Average 8.6%                 Patient Care Team:  Justen Barragan Jr., MD as PCP - General (Family Medicine)  ДМИТРИЙ Macdonald MD as Consulting Physician (Otolaryngology)    Review of Systems   Constitutional: Negative for activity change, fatigue and unexpected weight change.   HENT: Negative for ear discharge, ear pain, hearing loss, rhinorrhea, tinnitus, trouble swallowing and voice change.    Eyes: Negative for discharge and visual disturbance.   Respiratory: Negative for cough, chest tightness, shortness of breath and wheezing.    Cardiovascular: Negative for chest pain, palpitations and leg swelling.   Gastrointestinal: Negative for abdominal pain, blood in stool, constipation, diarrhea, nausea and vomiting.   Endocrine: Negative for polydipsia and polyuria.   Genitourinary: Negative for difficulty urinating, dyspareunia, dysuria, frequency, hematuria and menstrual problem.   Musculoskeletal: Negative for arthralgias, back pain, joint  "swelling, myalgias and neck pain.   Skin: Negative for rash.   Neurological: Negative for dizziness, weakness, light-headedness and headaches.   Hematological: Does not bruise/bleed easily.   Psychiatric/Behavioral: Negative for confusion, dysphoric mood and sleep disturbance. The patient is not nervous/anxious.        Objective:      /70 (BP Location: Left arm, Patient Position: Sitting, BP Method: Small (Manual))   Pulse (!) 57   Ht 5' 3" (1.6 m)   Wt 59 kg (130 lb 1.6 oz)   SpO2 98%   BMI 23.05 kg/m²     Physical Exam   Constitutional: She is oriented to person, place, and time. She appears well-developed and well-nourished. She is cooperative.   HENT:   Head: Normocephalic and atraumatic.   Nose: Nose normal.   Mouth/Throat: Oropharynx is clear and moist and mucous membranes are normal.   Eyes: Conjunctivae are normal. No scleral icterus.   Neck: Neck supple. No JVD present. Carotid bruit is not present. No thyromegaly present.   Cardiovascular: Normal rate, regular rhythm, normal heart sounds and normal pulses. Exam reveals no gallop and no friction rub.   No murmur heard.  Pulmonary/Chest: Effort normal and breath sounds normal. She has no wheezes. She has no rhonchi. She has no rales.   Abdominal: Soft. Bowel sounds are normal. She exhibits no distension and no mass. There is no splenomegaly or hepatomegaly. There is no tenderness.   Musculoskeletal: Normal range of motion. She exhibits no edema or tenderness.   Lymphadenopathy:     She has no cervical adenopathy.     She has no axillary adenopathy.   Neurological: She is alert and oriented to person, place, and time. She has normal strength and normal reflexes. No cranial nerve deficit or sensory deficit.   Skin: Skin is warm and dry.   Psychiatric: She has a normal mood and affect. Her speech is normal.   Vitals reviewed.        Assessment:       1. Routine general medical examination at a health care facility    2. HTN (hypertension), benign  " "  3. Hyperlipidemia, unspecified hyperlipidemia type    4. Hypothyroidism, unspecified type    5. [place] cardiac risk <10% in next 10 years    6. History of skull fracture    7. History of intracranial hemorrhage    8. Hypercalcemia    9. Laryngopharyngeal reflux (LPR)    10. Vitamin D deficiency    11. Allergic rhinitis, unspecified seasonality, unspecified trigger        Plan:       Discussed routine examinations and screenings.   Offered influenza and shingles vaccines.   Discussed colon cancer screening:  Patient agrees to colonoscopy/fecal immunochemical test.   Pneumovax due in early December, 2019.   Remainder of Health Maintenance reviewed - up to date.  Enroll in digital medicine clinic for hypertension.    Labs (see Orders).  Orders Placed This Encounter    Lipid panel    Comprehensive metabolic panel    TSH    T4, free    Microalbumin/creatinine urine ratio    Vitamin D    Hemoglobin A1c    CBC auto differential     We will call the patient with results & make further recommendations at that time.          "This note will not be shared with the patient."  "

## 2019-10-08 ENCOUNTER — OFFICE VISIT (OUTPATIENT)
Dept: FAMILY MEDICINE | Facility: CLINIC | Age: 64
End: 2019-10-08
Attending: FAMILY MEDICINE
Payer: COMMERCIAL

## 2019-10-08 VITALS
WEIGHT: 130.13 LBS | BODY MASS INDEX: 23.06 KG/M2 | SYSTOLIC BLOOD PRESSURE: 130 MMHG | HEIGHT: 63 IN | DIASTOLIC BLOOD PRESSURE: 70 MMHG | HEART RATE: 57 BPM | OXYGEN SATURATION: 98 %

## 2019-10-08 DIAGNOSIS — I10 HTN (HYPERTENSION), BENIGN: ICD-10-CM

## 2019-10-08 DIAGNOSIS — K21.9 LARYNGOPHARYNGEAL REFLUX (LPR): ICD-10-CM

## 2019-10-08 DIAGNOSIS — Z87.81 HISTORY OF SKULL FRACTURE: ICD-10-CM

## 2019-10-08 DIAGNOSIS — Z86.79 HISTORY OF INTRACRANIAL HEMORRHAGE: ICD-10-CM

## 2019-10-08 DIAGNOSIS — E55.9 VITAMIN D DEFICIENCY: ICD-10-CM

## 2019-10-08 DIAGNOSIS — E03.9 HYPOTHYROIDISM, UNSPECIFIED TYPE: ICD-10-CM

## 2019-10-08 DIAGNOSIS — Z91.89 FRAMINGHAM CARDIAC RISK <10% IN NEXT 10 YEARS: ICD-10-CM

## 2019-10-08 DIAGNOSIS — E83.52 HYPERCALCEMIA: ICD-10-CM

## 2019-10-08 DIAGNOSIS — J30.9 ALLERGIC RHINITIS, UNSPECIFIED SEASONALITY, UNSPECIFIED TRIGGER: ICD-10-CM

## 2019-10-08 DIAGNOSIS — Z00.00 ROUTINE GENERAL MEDICAL EXAMINATION AT A HEALTH CARE FACILITY: Primary | ICD-10-CM

## 2019-10-08 DIAGNOSIS — E78.5 HYPERLIPIDEMIA, UNSPECIFIED HYPERLIPIDEMIA TYPE: ICD-10-CM

## 2019-10-08 PROCEDURE — 99397 PR PREVENTIVE VISIT,EST,65 & OVER: ICD-10-PCS | Mod: S$GLB,,, | Performed by: FAMILY MEDICINE

## 2019-10-08 PROCEDURE — 3078F DIAST BP <80 MM HG: CPT | Mod: CPTII,S$GLB,, | Performed by: FAMILY MEDICINE

## 2019-10-08 PROCEDURE — 3078F PR MOST RECENT DIASTOLIC BLOOD PRESSURE < 80 MM HG: ICD-10-PCS | Mod: CPTII,S$GLB,, | Performed by: FAMILY MEDICINE

## 2019-10-08 PROCEDURE — 3075F SYST BP GE 130 - 139MM HG: CPT | Mod: CPTII,S$GLB,, | Performed by: FAMILY MEDICINE

## 2019-10-08 PROCEDURE — 99999 PR PBB SHADOW E&M-EST. PATIENT-LVL III: ICD-10-PCS | Mod: PBBFAC,,, | Performed by: FAMILY MEDICINE

## 2019-10-08 PROCEDURE — 99999 PR PBB SHADOW E&M-EST. PATIENT-LVL III: CPT | Mod: PBBFAC,,, | Performed by: FAMILY MEDICINE

## 2019-10-08 PROCEDURE — 3075F PR MOST RECENT SYSTOLIC BLOOD PRESS GE 130-139MM HG: ICD-10-PCS | Mod: CPTII,S$GLB,, | Performed by: FAMILY MEDICINE

## 2019-10-08 PROCEDURE — 99397 PER PM REEVAL EST PAT 65+ YR: CPT | Mod: S$GLB,,, | Performed by: FAMILY MEDICINE

## 2019-10-08 RX ORDER — BISOPROLOL FUMARATE 10 MG/1
10 TABLET, FILM COATED ORAL DAILY
Qty: 90 TABLET | Refills: 3 | Status: SHIPPED | OUTPATIENT
Start: 2019-10-08 | End: 2020-07-02 | Stop reason: SDUPTHER

## 2019-10-15 ENCOUNTER — LAB VISIT (OUTPATIENT)
Dept: LAB | Facility: HOSPITAL | Age: 64
End: 2019-10-15
Attending: FAMILY MEDICINE
Payer: COMMERCIAL

## 2019-10-15 DIAGNOSIS — E03.9 HYPOTHYROIDISM, UNSPECIFIED TYPE: ICD-10-CM

## 2019-10-15 DIAGNOSIS — E78.5 HYPERLIPIDEMIA, UNSPECIFIED HYPERLIPIDEMIA TYPE: ICD-10-CM

## 2019-10-15 DIAGNOSIS — Z86.79 HISTORY OF INTRACRANIAL HEMORRHAGE: ICD-10-CM

## 2019-10-15 DIAGNOSIS — I10 HTN (HYPERTENSION), BENIGN: ICD-10-CM

## 2019-10-15 DIAGNOSIS — E55.9 VITAMIN D DEFICIENCY: ICD-10-CM

## 2019-10-15 DIAGNOSIS — Z91.89 FRAMINGHAM CARDIAC RISK <10% IN NEXT 10 YEARS: ICD-10-CM

## 2019-10-15 DIAGNOSIS — E83.52 HYPERCALCEMIA: ICD-10-CM

## 2019-10-15 PROBLEM — D75.89 MACROCYTOSIS WITHOUT ANEMIA: Status: ACTIVE | Noted: 2019-10-15

## 2019-10-15 LAB
25(OH)D3+25(OH)D2 SERPL-MCNC: 66 NG/ML (ref 30–96)
ALBUMIN SERPL BCP-MCNC: 4.2 G/DL (ref 3.5–5.2)
ALP SERPL-CCNC: 82 U/L (ref 55–135)
ALT SERPL W/O P-5'-P-CCNC: 16 U/L (ref 10–44)
ANION GAP SERPL CALC-SCNC: 10 MMOL/L (ref 8–16)
AST SERPL-CCNC: 27 U/L (ref 10–40)
BASOPHILS # BLD AUTO: 0.06 K/UL (ref 0–0.2)
BASOPHILS NFR BLD: 0.9 % (ref 0–1.9)
BILIRUB SERPL-MCNC: 0.8 MG/DL (ref 0.1–1)
BUN SERPL-MCNC: 12 MG/DL (ref 8–23)
CALCIUM SERPL-MCNC: 10.5 MG/DL (ref 8.7–10.5)
CHLORIDE SERPL-SCNC: 106 MMOL/L (ref 95–110)
CHOLEST SERPL-MCNC: 221 MG/DL (ref 120–199)
CHOLEST/HDLC SERPL: 3 {RATIO} (ref 2–5)
CO2 SERPL-SCNC: 25 MMOL/L (ref 23–29)
CREAT SERPL-MCNC: 1 MG/DL (ref 0.5–1.4)
DIFFERENTIAL METHOD: ABNORMAL
EOSINOPHIL # BLD AUTO: 0.3 K/UL (ref 0–0.5)
EOSINOPHIL NFR BLD: 4.2 % (ref 0–8)
ERYTHROCYTE [DISTWIDTH] IN BLOOD BY AUTOMATED COUNT: 12.6 % (ref 11.5–14.5)
EST. GFR  (AFRICAN AMERICAN): >60 ML/MIN/1.73 M^2
EST. GFR  (NON AFRICAN AMERICAN): 58.8 ML/MIN/1.73 M^2
ESTIMATED AVG GLUCOSE: 97 MG/DL (ref 68–131)
FOLATE SERPL-MCNC: 18.4 NG/ML (ref 4–24)
GLUCOSE SERPL-MCNC: 83 MG/DL (ref 70–110)
HBA1C MFR BLD HPLC: 5 % (ref 4–5.6)
HCT VFR BLD AUTO: 40.6 % (ref 37–48.5)
HDLC SERPL-MCNC: 73 MG/DL (ref 40–75)
HDLC SERPL: 33 % (ref 20–50)
HGB BLD-MCNC: 13.2 G/DL (ref 12–16)
IMM GRANULOCYTES # BLD AUTO: 0.02 K/UL (ref 0–0.04)
IMM GRANULOCYTES NFR BLD AUTO: 0.3 % (ref 0–0.5)
LDLC SERPL CALC-MCNC: 125.2 MG/DL (ref 63–159)
LYMPHOCYTES # BLD AUTO: 2.2 K/UL (ref 1–4.8)
LYMPHOCYTES NFR BLD: 34.7 % (ref 18–48)
MCH RBC QN AUTO: 33 PG (ref 27–31)
MCHC RBC AUTO-ENTMCNC: 32.5 G/DL (ref 32–36)
MCV RBC AUTO: 102 FL (ref 82–98)
MONOCYTES # BLD AUTO: 0.8 K/UL (ref 0.3–1)
MONOCYTES NFR BLD: 12.8 % (ref 4–15)
NEUTROPHILS # BLD AUTO: 3 K/UL (ref 1.8–7.7)
NEUTROPHILS NFR BLD: 47.1 % (ref 38–73)
NONHDLC SERPL-MCNC: 148 MG/DL
NRBC BLD-RTO: 0 /100 WBC
PLATELET # BLD AUTO: 192 K/UL (ref 150–350)
PMV BLD AUTO: 10.1 FL (ref 9.2–12.9)
POTASSIUM SERPL-SCNC: 4.3 MMOL/L (ref 3.5–5.1)
PROT SERPL-MCNC: 8.2 G/DL (ref 6–8.4)
RBC # BLD AUTO: 4 M/UL (ref 4–5.4)
SODIUM SERPL-SCNC: 141 MMOL/L (ref 136–145)
T4 FREE SERPL-MCNC: 0.87 NG/DL (ref 0.71–1.51)
TRIGL SERPL-MCNC: 114 MG/DL (ref 30–150)
TSH SERPL DL<=0.005 MIU/L-ACNC: 2.51 UIU/ML (ref 0.4–4)
VIT B12 SERPL-MCNC: 677 PG/ML (ref 210–950)
WBC # BLD AUTO: 6.39 K/UL (ref 3.9–12.7)

## 2019-10-15 PROCEDURE — 82607 VITAMIN B-12: CPT

## 2019-10-15 PROCEDURE — 83036 HEMOGLOBIN GLYCOSYLATED A1C: CPT

## 2019-10-15 PROCEDURE — 36415 COLL VENOUS BLD VENIPUNCTURE: CPT | Mod: PO

## 2019-10-15 PROCEDURE — 82746 ASSAY OF FOLIC ACID SERUM: CPT

## 2019-10-15 PROCEDURE — 80061 LIPID PANEL: CPT

## 2019-10-15 PROCEDURE — 85025 COMPLETE CBC W/AUTO DIFF WBC: CPT

## 2019-10-15 PROCEDURE — 80053 COMPREHEN METABOLIC PANEL: CPT

## 2019-10-15 PROCEDURE — 84443 ASSAY THYROID STIM HORMONE: CPT

## 2019-10-15 PROCEDURE — 84439 ASSAY OF FREE THYROXINE: CPT

## 2019-10-15 PROCEDURE — 82306 VITAMIN D 25 HYDROXY: CPT

## 2019-10-17 ENCOUNTER — PATIENT MESSAGE (OUTPATIENT)
Dept: FAMILY MEDICINE | Facility: CLINIC | Age: 64
End: 2019-10-17

## 2019-10-17 DIAGNOSIS — E78.5 HYPERLIPIDEMIA, UNSPECIFIED HYPERLIPIDEMIA TYPE: ICD-10-CM

## 2019-10-18 ENCOUNTER — PATIENT MESSAGE (OUTPATIENT)
Dept: FAMILY MEDICINE | Facility: CLINIC | Age: 64
End: 2019-10-18

## 2019-10-19 ENCOUNTER — PATIENT MESSAGE (OUTPATIENT)
Dept: FAMILY MEDICINE | Facility: CLINIC | Age: 64
End: 2019-10-19

## 2019-10-21 ENCOUNTER — PATIENT MESSAGE (OUTPATIENT)
Dept: FAMILY MEDICINE | Facility: CLINIC | Age: 64
End: 2019-10-21

## 2019-10-25 DIAGNOSIS — Z12.11 COLON CANCER SCREENING: ICD-10-CM

## 2019-10-25 RX ORDER — LEVOTHYROXINE SODIUM 50 UG/1
TABLET ORAL
Qty: 90 TABLET | Refills: 3 | Status: SHIPPED | OUTPATIENT
Start: 2019-10-25 | End: 2020-10-22

## 2019-10-27 RX ORDER — LEVOTHYROXINE SODIUM 50 UG/1
TABLET ORAL
Qty: 90 TABLET | Refills: 0 | OUTPATIENT
Start: 2019-10-27

## 2019-12-27 ENCOUNTER — TELEPHONE (OUTPATIENT)
Dept: FAMILY MEDICINE | Facility: CLINIC | Age: 64
End: 2019-12-27

## 2019-12-27 RX ORDER — PRAVASTATIN SODIUM 20 MG/1
TABLET ORAL
Qty: 90 TABLET | Refills: 2 | Status: SHIPPED | OUTPATIENT
Start: 2019-12-27 | End: 2020-09-22

## 2019-12-27 NOTE — TELEPHONE ENCOUNTER
----- Message from Kaitlyn Maciel sent at 12/27/2019 10:07 AM CST -----  Contact: Otf Vega   Name of Who is Calling: Otf Vega       What is the request in detail: Pt is calling to speak to staff in regards to his wife disability status.... Please call to further assist .       Can the clinic reply by [facility]: N       What [facility]: [phone]

## 2019-12-27 NOTE — TELEPHONE ENCOUNTER
----- Message from Chelsea Colon sent at 12/26/2019  4:30 PM CST -----  Contact: pt   Pt called to speak with  only              [phone] or [phone] cell

## 2019-12-27 NOTE — TELEPHONE ENCOUNTER
Spoke with the patient's  in regards to his phone call. He states the patient has an appt with her Neurologist  Dr. Brennen Randall on this coming Monday 12/30/19.

## 2020-01-10 ENCOUNTER — PATIENT OUTREACH (OUTPATIENT)
Dept: ADMINISTRATIVE | Facility: OTHER | Age: 65
End: 2020-01-10

## 2020-01-10 NOTE — PROGRESS NOTES
Chart reviewed.   Immunizations: Triggered Imm Registry     Orders placed: n/a  Upcoming appts: n/a

## 2020-01-13 ENCOUNTER — OFFICE VISIT (OUTPATIENT)
Dept: OTOLARYNGOLOGY | Facility: CLINIC | Age: 65
End: 2020-01-13
Payer: COMMERCIAL

## 2020-01-13 VITALS
HEART RATE: 56 BPM | TEMPERATURE: 98 F | SYSTOLIC BLOOD PRESSURE: 140 MMHG | DIASTOLIC BLOOD PRESSURE: 77 MMHG | BODY MASS INDEX: 23.05 KG/M2 | HEIGHT: 63 IN

## 2020-01-13 DIAGNOSIS — R49.0 HOARSENESS: ICD-10-CM

## 2020-01-13 DIAGNOSIS — J34.2 NASAL SEPTAL DEVIATION: ICD-10-CM

## 2020-01-13 DIAGNOSIS — K21.9 LARYNGOPHARYNGEAL REFLUX (LPR): ICD-10-CM

## 2020-01-13 DIAGNOSIS — R13.10 DYSPHAGIA, UNSPECIFIED TYPE: Primary | ICD-10-CM

## 2020-01-13 DIAGNOSIS — R09.89 CHRONIC THROAT CLEARING: ICD-10-CM

## 2020-01-13 DIAGNOSIS — Z74.09 IMPAIRED FUNCTIONAL MOBILITY, BALANCE, AND ENDURANCE: ICD-10-CM

## 2020-01-13 DIAGNOSIS — J30.9 ALLERGIC RHINITIS, UNSPECIFIED SEASONALITY, UNSPECIFIED TRIGGER: ICD-10-CM

## 2020-01-13 PROCEDURE — 1100F PTFALLS ASSESS-DOCD GE2>/YR: CPT | Mod: CPTII,S$GLB,, | Performed by: SPECIALIST

## 2020-01-13 PROCEDURE — 3288F FALL RISK ASSESSMENT DOCD: CPT | Mod: CPTII,S$GLB,, | Performed by: SPECIALIST

## 2020-01-13 PROCEDURE — 3078F DIAST BP <80 MM HG: CPT | Mod: CPTII,S$GLB,, | Performed by: SPECIALIST

## 2020-01-13 PROCEDURE — 1126F PR PAIN SEVERITY QUANTIFIED, NO PAIN PRESENT: ICD-10-PCS | Mod: S$GLB,,, | Performed by: SPECIALIST

## 2020-01-13 PROCEDURE — 99214 OFFICE O/P EST MOD 30 MIN: CPT | Mod: 25,S$GLB,, | Performed by: SPECIALIST

## 2020-01-13 PROCEDURE — 99214 PR OFFICE/OUTPT VISIT, EST, LEVL IV, 30-39 MIN: ICD-10-PCS | Mod: 25,S$GLB,, | Performed by: SPECIALIST

## 2020-01-13 PROCEDURE — 3078F PR MOST RECENT DIASTOLIC BLOOD PRESSURE < 80 MM HG: ICD-10-PCS | Mod: CPTII,S$GLB,, | Performed by: SPECIALIST

## 2020-01-13 PROCEDURE — 1100F PR PT FALLS ASSESS DOC 2+ FALLS/FALL W/INJURY/YR: ICD-10-PCS | Mod: CPTII,S$GLB,, | Performed by: SPECIALIST

## 2020-01-13 PROCEDURE — 1159F PR MEDICATION LIST DOCUMENTED IN MEDICAL RECORD: ICD-10-PCS | Mod: S$GLB,,, | Performed by: SPECIALIST

## 2020-01-13 PROCEDURE — 1126F AMNT PAIN NOTED NONE PRSNT: CPT | Mod: S$GLB,,, | Performed by: SPECIALIST

## 2020-01-13 PROCEDURE — 1159F MED LIST DOCD IN RCRD: CPT | Mod: S$GLB,,, | Performed by: SPECIALIST

## 2020-01-13 PROCEDURE — 3077F PR MOST RECENT SYSTOLIC BLOOD PRESSURE >= 140 MM HG: ICD-10-PCS | Mod: CPTII,S$GLB,, | Performed by: SPECIALIST

## 2020-01-13 PROCEDURE — 3077F SYST BP >= 140 MM HG: CPT | Mod: CPTII,S$GLB,, | Performed by: SPECIALIST

## 2020-01-13 PROCEDURE — 31575 DIAGNOSTIC LARYNGOSCOPY: CPT | Mod: S$GLB,,, | Performed by: SPECIALIST

## 2020-01-13 PROCEDURE — 3288F PR FALLS RISK ASSESSMENT DOCUMENTED: ICD-10-PCS | Mod: CPTII,S$GLB,, | Performed by: SPECIALIST

## 2020-01-13 PROCEDURE — 31575 LARYNGOSCOPY: ICD-10-PCS | Mod: S$GLB,,, | Performed by: SPECIALIST

## 2020-01-13 RX ORDER — CLOPIDOGREL BISULFATE 75 MG/1
TABLET ORAL
COMMUNITY
Start: 2019-12-30

## 2020-01-13 RX ORDER — LACOSAMIDE 200 MG/1
TABLET, FILM COATED ORAL
COMMUNITY
Start: 2020-01-06

## 2020-01-13 RX ORDER — FAMOTIDINE 20 MG/1
20 TABLET, FILM COATED ORAL 2 TIMES DAILY
Qty: 60 TABLET | Refills: 11 | Status: SHIPPED | OUTPATIENT
Start: 2020-01-13 | End: 2020-12-16 | Stop reason: SDUPTHER

## 2020-01-13 NOTE — PROGRESS NOTES
Subjective:       Patient ID: Shira Vega is a 64 y.o. female.    Chief Complaint: Follow-up    The patient is coming in for follow-up visit.  There are multiple issues to discuss:  1.  She had an episode in early December where she fell to the ground in her backyard.  She has stand faxed the her left 3rd finger.  She is wearing his went on the hand.    2.  She has an issue with falling and unsteadiness.  Whenever she has an open space if he does not have something to hold onto she is very unsteady.  3.  She has allergic rhinitis which use Claritin and Flonase.  Symptoms are generally reasonably well control.  4.  She takes a through 100 mg doses Zantac every night for her laryngopharyngeal reflux.  In general her throat clearing has improved but not resolved.  She continues to have occasional dysphagia and some mild raspiness of her voice.    Review of Systems   Constitutional: Positive for fatigue. Negative for activity change, appetite change, chills, fever and unexpected weight change.   HENT: Positive for congestion, postnasal drip, rhinorrhea and sore throat. Negative for ear discharge, ear pain, facial swelling, hearing loss, mouth sores, sinus pressure, sinus pain, sneezing, tinnitus, trouble swallowing and voice change.    Eyes: Negative for photophobia, pain, discharge, redness, itching and visual disturbance.   Respiratory: Positive for cough. Negative for apnea, choking, shortness of breath and wheezing.    Cardiovascular: Negative for chest pain and palpitations.   Gastrointestinal: Negative for abdominal distention, abdominal pain, nausea and vomiting.   Musculoskeletal: Negative for arthralgias, myalgias, neck pain and neck stiffness.   Skin: Negative.  Negative for color change, pallor and rash.   Allergic/Immunologic: Negative for environmental allergies, food allergies and immunocompromised state.   Neurological: Positive for dizziness, seizures, weakness, light-headedness and headaches.  Negative for facial asymmetry, speech difficulty and numbness.        Frequent falls   Hematological: Negative for adenopathy. Does not bruise/bleed easily.   Psychiatric/Behavioral: Positive for confusion and decreased concentration. Negative for sleep disturbance.       Objective:      Physical Exam   Constitutional: She is oriented to person, place, and time. She appears well-developed and well-nourished. She is cooperative.   HENT:   Head: Normocephalic.   Right Ear: External ear and ear canal normal. Tympanic membrane is retracted.   Left Ear: External ear and ear canal normal. Tympanic membrane is retracted.   Nose: Mucosal edema (cyanotic, boggy inferior turbinates bilaterally), rhinorrhea (clear mucus bilaterally) and septal deviation (To the left) present.   Mouth/Throat: Uvula is midline, oropharynx is clear and moist and mucous membranes are normal. No oral lesions.   Eyes: Pupils are equal, round, and reactive to light. EOM and lids are normal. Right eye exhibits no discharge and no exudate. Left eye exhibits no discharge and no exudate. Right conjunctiva is injected. Left conjunctiva is injected.   Neck: Trachea normal and normal range of motion. No muscular tenderness present. No tracheal deviation present. No thyroid mass and no thyromegaly present.   Cardiovascular: Normal rate, regular rhythm, normal heart sounds and normal pulses.   Pulmonary/Chest: Effort normal and breath sounds normal. No stridor. She has no decreased breath sounds. She has no wheezes. She has no rhonchi. She has no rales.   Abdominal: Soft. Bowel sounds are normal. There is no tenderness.   Musculoskeletal: Normal range of motion.   Lymphadenopathy:        Head (right side): No submental, no submandibular, no preauricular, no posterior auricular and no occipital adenopathy present.        Head (left side): No submental, no submandibular, no preauricular, no posterior auricular and no occipital adenopathy present.     She has no  cervical adenopathy.   Neurological: She is alert and oriented to person, place, and time. She has normal strength. No cranial nerve deficit or sensory deficit. Gait normal.   Neuro otologic-wide-based gait, no nystagmus   Skin: Skin is warm and dry. No petechiae and no rash noted. No cyanosis. Nails show no clubbing.   Psychiatric: She has a normal mood and affect. Her speech is normal and behavior is normal. Judgment and thought content normal. Cognition and memory are normal.        flexible video nasolaryngoscopy -septum deviated to the left, nasal changes of allergic rhinitis; laryngeal changes consistent with low-grade laryngopharyngeal reflux that is being controlled with H2 Cate therapy    Assessment:       1. Dysphagia, unspecified type    2. Chronic throat clearing    3. Laryngopharyngeal reflux (LPR)    4. Allergic rhinitis, unspecified seasonality, unspecified trigger    5. Nasal septal deviation    6. Impaired functional mobility, balance, and endurance        Plan:        I am switching the patient from ranatidine to famotidine because of the potential carcinogenic affects of the former.  She will continue with her other allergy I am sinus medications.  I will recheck her in 3 months.

## 2020-01-13 NOTE — PROCEDURES
"Laryngoscopy  Date/Time: 1/13/2020 2:00 PM  Performed by: ДМИТРИЙ Macdonald MD  Authorized by: ДМИТРИЙ Macdonald MD     Time out: Immediately prior to procedure a "time out" was called to verify the correct patient, procedure, equipment, support staff and site/side marked as required.    Consent Done?:  Yes (Verbal)  Anesthesia:     Local anesthetic:  Lidocaine 2% and Juan-Synephrine 1/2% (Topical aerosol)    Patient tolerance:  Patient tolerated the procedure well with no immediate complications    Decongestion performed?: Yes    Laryngoscopy:     Areas examined:  Vocal cords, larynx, hypopharynx, oropharynx, nasopharynx and nasal cavities    Laryngoscope size:  4 mm (Flexible video nasolaryngoscopy)  Nose External:      No external nasal deformity  Nose Intranasal:      Mucosa no polyps     Mucosa ulcers not present     No mucosa lesions present (  Clear mucus in the nasal passages bilaterally)     Septum gross deformity ( septum deviated to the left)     Enlarged turbinates ( inferior turbinates enlarged bilaterally)  Nasopharynx:      No mucosa lesions     Adenoids not present     Posterior choanae patent     Eustachian tube patent  Larynx/hypopharynx:      No epiglottis lesions     No epiglottis edema     No AE folds lesions     No vocal cord polyps     Equal and normal bilateral ( vocal cords move symmetrically, no mucosal lesions noted)     No hypopharynx lesions     No piriform sinus pooling     No piriform sinus lesions     Post cricoid edema ( mild, improved from last endoscopy)     Post cricoid erythema ( mild,Improved from last endoscopy)      Flexible video nasolaryngoscopy - nasal septum deviated to the left with nasal changes of allergic rhinitis, laryngeal changes consistent with laryngopharyngeal reflux that is responding well to H2 Cate therapy.      "

## 2020-01-14 ENCOUNTER — PATIENT MESSAGE (OUTPATIENT)
Dept: OTOLARYNGOLOGY | Facility: CLINIC | Age: 65
End: 2020-01-14

## 2020-04-08 ENCOUNTER — PATIENT MESSAGE (OUTPATIENT)
Dept: FAMILY MEDICINE | Facility: CLINIC | Age: 65
End: 2020-04-08

## 2020-04-09 ENCOUNTER — PATIENT MESSAGE (OUTPATIENT)
Dept: FAMILY MEDICINE | Facility: CLINIC | Age: 65
End: 2020-04-09

## 2020-04-10 RX ORDER — TOPIRAMATE 50 MG/1
CAPSULE, EXTENDED RELEASE ORAL
COMMUNITY
Start: 2020-01-16 | End: 2023-09-18

## 2020-06-04 ENCOUNTER — PATIENT OUTREACH (OUTPATIENT)
Dept: ADMINISTRATIVE | Facility: OTHER | Age: 65
End: 2020-06-04

## 2020-06-04 NOTE — PROGRESS NOTES
Chart reviewed.   Immunizations: Triggered Imm Registry     Orders placed: n/a  Upcoming appts to satisfy [facility] topics: n/a

## 2020-06-05 ENCOUNTER — OFFICE VISIT (OUTPATIENT)
Dept: OTOLARYNGOLOGY | Facility: CLINIC | Age: 65
End: 2020-06-05
Payer: COMMERCIAL

## 2020-06-05 VITALS
SYSTOLIC BLOOD PRESSURE: 124 MMHG | TEMPERATURE: 97 F | HEIGHT: 63 IN | HEART RATE: 53 BPM | WEIGHT: 117.88 LBS | BODY MASS INDEX: 20.89 KG/M2 | DIASTOLIC BLOOD PRESSURE: 74 MMHG

## 2020-06-05 DIAGNOSIS — R13.10 DYSPHAGIA, UNSPECIFIED TYPE: ICD-10-CM

## 2020-06-05 DIAGNOSIS — Z01.812 PRE-PROCEDURE LAB EXAM: ICD-10-CM

## 2020-06-05 DIAGNOSIS — K21.9 LARYNGOPHARYNGEAL REFLUX (LPR): ICD-10-CM

## 2020-06-05 DIAGNOSIS — J34.2 NASAL SEPTAL DEVIATION: ICD-10-CM

## 2020-06-05 DIAGNOSIS — Z74.09 IMPAIRED FUNCTIONAL MOBILITY, BALANCE, AND ENDURANCE: ICD-10-CM

## 2020-06-05 DIAGNOSIS — J30.9 ALLERGIC RHINITIS, UNSPECIFIED SEASONALITY, UNSPECIFIED TRIGGER: Primary | ICD-10-CM

## 2020-06-05 DIAGNOSIS — R09.89 CHRONIC THROAT CLEARING: ICD-10-CM

## 2020-06-05 PROCEDURE — 1159F MED LIST DOCD IN RCRD: CPT | Mod: S$GLB,,, | Performed by: SPECIALIST

## 2020-06-05 PROCEDURE — 1100F PTFALLS ASSESS-DOCD GE2>/YR: CPT | Mod: CPTII,S$GLB,, | Performed by: SPECIALIST

## 2020-06-05 PROCEDURE — 99213 OFFICE O/P EST LOW 20 MIN: CPT | Mod: S$GLB,,, | Performed by: SPECIALIST

## 2020-06-05 PROCEDURE — 3288F PR FALLS RISK ASSESSMENT DOCUMENTED: ICD-10-PCS | Mod: CPTII,S$GLB,, | Performed by: SPECIALIST

## 2020-06-05 PROCEDURE — 1100F PR PT FALLS ASSESS DOC 2+ FALLS/FALL W/INJURY/YR: ICD-10-PCS | Mod: CPTII,S$GLB,, | Performed by: SPECIALIST

## 2020-06-05 PROCEDURE — 3074F SYST BP LT 130 MM HG: CPT | Mod: CPTII,S$GLB,, | Performed by: SPECIALIST

## 2020-06-05 PROCEDURE — 1126F PR PAIN SEVERITY QUANTIFIED, NO PAIN PRESENT: ICD-10-PCS | Mod: S$GLB,,, | Performed by: SPECIALIST

## 2020-06-05 PROCEDURE — 3078F PR MOST RECENT DIASTOLIC BLOOD PRESSURE < 80 MM HG: ICD-10-PCS | Mod: CPTII,S$GLB,, | Performed by: SPECIALIST

## 2020-06-05 PROCEDURE — 3078F DIAST BP <80 MM HG: CPT | Mod: CPTII,S$GLB,, | Performed by: SPECIALIST

## 2020-06-05 PROCEDURE — 99213 PR OFFICE/OUTPT VISIT, EST, LEVL III, 20-29 MIN: ICD-10-PCS | Mod: S$GLB,,, | Performed by: SPECIALIST

## 2020-06-05 PROCEDURE — 1159F PR MEDICATION LIST DOCUMENTED IN MEDICAL RECORD: ICD-10-PCS | Mod: S$GLB,,, | Performed by: SPECIALIST

## 2020-06-05 PROCEDURE — 1126F AMNT PAIN NOTED NONE PRSNT: CPT | Mod: S$GLB,,, | Performed by: SPECIALIST

## 2020-06-05 PROCEDURE — 3074F PR MOST RECENT SYSTOLIC BLOOD PRESSURE < 130 MM HG: ICD-10-PCS | Mod: CPTII,S$GLB,, | Performed by: SPECIALIST

## 2020-06-05 PROCEDURE — 3288F FALL RISK ASSESSMENT DOCD: CPT | Mod: CPTII,S$GLB,, | Performed by: SPECIALIST

## 2020-06-06 PROBLEM — R13.10 DYSPHAGIA: Status: ACTIVE | Noted: 2020-06-06
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
## 2020-06-30 ENCOUNTER — LAB VISIT (OUTPATIENT)
Dept: PRIMARY CARE CLINIC | Facility: CLINIC | Age: 65
End: 2020-06-30
Payer: COMMERCIAL

## 2020-06-30 DIAGNOSIS — Z01.812 PRE-PROCEDURE LAB EXAM: ICD-10-CM

## 2020-06-30 PROCEDURE — U0003 INFECTIOUS AGENT DETECTION BY NUCLEIC ACID (DNA OR RNA); SEVERE ACUTE RESPIRATORY SYNDROME CORONAVIRUS 2 (SARS-COV-2) (CORONAVIRUS DISEASE [COVID-19]), AMPLIFIED PROBE TECHNIQUE, MAKING USE OF HIGH THROUGHPUT TECHNOLOGIES AS DESCRIBED BY CMS-2020-01-R: HCPCS

## 2020-07-01 ENCOUNTER — PATIENT OUTREACH (OUTPATIENT)
Dept: ADMINISTRATIVE | Facility: OTHER | Age: 65
End: 2020-07-01

## 2020-07-02 RX ORDER — BISOPROLOL FUMARATE 10 MG/1
10 TABLET, FILM COATED ORAL DAILY
Qty: 90 TABLET | Refills: 0 | Status: SHIPPED | OUTPATIENT
Start: 2020-07-02 | End: 2021-01-15

## 2020-07-03 LAB — SARS-COV-2 RNA RESP QL NAA+PROBE: NEGATIVE

## 2020-07-06 ENCOUNTER — OFFICE VISIT (OUTPATIENT)
Dept: OTOLARYNGOLOGY | Facility: CLINIC | Age: 65
End: 2020-07-06
Payer: COMMERCIAL

## 2020-07-06 VITALS
TEMPERATURE: 97 F | DIASTOLIC BLOOD PRESSURE: 72 MMHG | BODY MASS INDEX: 20.93 KG/M2 | WEIGHT: 118.13 LBS | HEIGHT: 63 IN | HEART RATE: 53 BPM | SYSTOLIC BLOOD PRESSURE: 130 MMHG

## 2020-07-06 DIAGNOSIS — J34.2 NASAL SEPTAL DEVIATION: ICD-10-CM

## 2020-07-06 DIAGNOSIS — Z86.79 HISTORY OF INTRACRANIAL HEMORRHAGE: ICD-10-CM

## 2020-07-06 DIAGNOSIS — R13.10 DYSPHAGIA, UNSPECIFIED TYPE: Primary | ICD-10-CM

## 2020-07-06 DIAGNOSIS — J30.9 ALLERGIC RHINITIS, UNSPECIFIED SEASONALITY, UNSPECIFIED TRIGGER: ICD-10-CM

## 2020-07-06 DIAGNOSIS — R09.89 CHRONIC THROAT CLEARING: ICD-10-CM

## 2020-07-06 DIAGNOSIS — K21.9 LARYNGOPHARYNGEAL REFLUX (LPR): ICD-10-CM

## 2020-07-06 DIAGNOSIS — Z74.09 IMPAIRED FUNCTIONAL MOBILITY, BALANCE, AND ENDURANCE: ICD-10-CM

## 2020-07-06 PROCEDURE — 1126F PR PAIN SEVERITY QUANTIFIED, NO PAIN PRESENT: ICD-10-PCS | Mod: S$GLB,,, | Performed by: SPECIALIST

## 2020-07-06 PROCEDURE — 1159F PR MEDICATION LIST DOCUMENTED IN MEDICAL RECORD: ICD-10-PCS | Mod: S$GLB,,, | Performed by: SPECIALIST

## 2020-07-06 PROCEDURE — 31575 DIAGNOSTIC LARYNGOSCOPY: CPT | Mod: S$GLB,,, | Performed by: SPECIALIST

## 2020-07-06 PROCEDURE — 99214 PR OFFICE/OUTPT VISIT, EST, LEVL IV, 30-39 MIN: ICD-10-PCS | Mod: 25,S$GLB,, | Performed by: SPECIALIST

## 2020-07-06 PROCEDURE — 1101F PT FALLS ASSESS-DOCD LE1/YR: CPT | Mod: CPTII,S$GLB,, | Performed by: SPECIALIST

## 2020-07-06 PROCEDURE — 3078F DIAST BP <80 MM HG: CPT | Mod: CPTII,S$GLB,, | Performed by: SPECIALIST

## 2020-07-06 PROCEDURE — 3008F PR BODY MASS INDEX (BMI) DOCUMENTED: ICD-10-PCS | Mod: CPTII,S$GLB,, | Performed by: SPECIALIST

## 2020-07-06 PROCEDURE — 31575 LARYNGOSCOPY: ICD-10-PCS | Mod: S$GLB,,, | Performed by: SPECIALIST

## 2020-07-06 PROCEDURE — 1159F MED LIST DOCD IN RCRD: CPT | Mod: S$GLB,,, | Performed by: SPECIALIST

## 2020-07-06 PROCEDURE — 3078F PR MOST RECENT DIASTOLIC BLOOD PRESSURE < 80 MM HG: ICD-10-PCS | Mod: CPTII,S$GLB,, | Performed by: SPECIALIST

## 2020-07-06 PROCEDURE — 3008F BODY MASS INDEX DOCD: CPT | Mod: CPTII,S$GLB,, | Performed by: SPECIALIST

## 2020-07-06 PROCEDURE — 3075F PR MOST RECENT SYSTOLIC BLOOD PRESS GE 130-139MM HG: ICD-10-PCS | Mod: CPTII,S$GLB,, | Performed by: SPECIALIST

## 2020-07-06 PROCEDURE — 3075F SYST BP GE 130 - 139MM HG: CPT | Mod: CPTII,S$GLB,, | Performed by: SPECIALIST

## 2020-07-06 PROCEDURE — 99214 OFFICE O/P EST MOD 30 MIN: CPT | Mod: 25,S$GLB,, | Performed by: SPECIALIST

## 2020-07-06 PROCEDURE — 1101F PR PT FALLS ASSESS DOC 0-1 FALLS W/OUT INJ PAST YR: ICD-10-PCS | Mod: CPTII,S$GLB,, | Performed by: SPECIALIST

## 2020-07-06 PROCEDURE — 1126F AMNT PAIN NOTED NONE PRSNT: CPT | Mod: S$GLB,,, | Performed by: SPECIALIST

## 2020-07-06 NOTE — PROCEDURES
"Laryngoscopy    Date/Time: 7/6/2020 2:00 PM  Performed by: ДМИТРИЙ Macdonald MD  Authorized by: ДМИТРИЙ Macdonald MD     Time out: Immediately prior to procedure a "time out" was called to verify the correct patient, procedure, equipment, support staff and site/side marked as required.    Consent Done?:  Yes (Verbal)  Anesthesia:     Local anesthetic:  Lidocaine 2% and Juan-Synephrine 1/2% (Sloppy wet cottonoids placed in each nasal passage moistened with)    Patient tolerance:  Patient tolerated the procedure well with no immediate complications    Decongestion performed?: Yes    Laryngoscopy:     Areas examined:  Vocal cords, larynx, hypopharynx, oropharynx, nasopharynx and nasal cavities (Flexible video nasolaryngoscopy)    Laryngoscope size:  4 mm  Nose External:      No external nasal deformity  Nose Intranasal:      Mucosa no polyps     Mucosa ulcers not present     No mucosa lesions present (Clear mucus in the nasal passages bilaterally)     Septum gross deformity ( septum deviated to the left)     Enlarged turbinates ( inferior turbinates enlarged bilaterally)  Nasopharynx:      No mucosa lesions (                                                                                                                                                                                                                                                                )     Posterior choanae patent      "

## 2020-07-06 NOTE — PROGRESS NOTES
Subjective:       Patient ID: Shira Vega is a 64 y.o. female.    Chief Complaint: Follow-up (with scope)    The patient is coming in for follow-up visit.  There are multiple issues to discuss:  1.  She is taking famotidine twice daily.  She feels like she is having less heartburn and less throat clearing and dysphagia.  She has continues to have episodes of hoarseness, particularly after prolonged talking.  Overall, she feels like her reflux symptoms have improved significantly.  2.  She continues with her ongoing vestibular rehabilitative therapy.  She feels like it is helping her significantly.  She still has issues when walking down inclined and with general instability, but both are improving slowly.  3.  She is using loratadine daily and supplementing with Flonase nasal spray when needed.  In her nasal symptoms are well controlled using this regimen.       Follow-up  Associated symptoms include congestion, coughing, fatigue, headaches, a sore throat and weakness. Pertinent negatives include no abdominal pain, arthralgias, chest pain, chills, fever, myalgias, nausea, neck pain, numbness, rash or vomiting.     Review of Systems   Constitutional: Positive for fatigue. Negative for activity change, appetite change, chills, fever and unexpected weight change.   HENT: Positive for congestion, postnasal drip, rhinorrhea and sore throat. Negative for ear discharge, ear pain, facial swelling, hearing loss, mouth sores, sinus pressure, sinus pain, sneezing, tinnitus, trouble swallowing and voice change.    Eyes: Negative for photophobia, pain, discharge, redness, itching and visual disturbance.   Respiratory: Positive for cough. Negative for apnea, choking, shortness of breath and wheezing.    Cardiovascular: Negative for chest pain and palpitations.   Gastrointestinal: Negative for abdominal distention, abdominal pain, nausea and vomiting.   Musculoskeletal: Negative for arthralgias, myalgias, neck pain and neck  stiffness.   Skin: Negative.  Negative for color change, pallor and rash.   Allergic/Immunologic: Negative for environmental allergies, food allergies and immunocompromised state.   Neurological: Positive for dizziness, seizures, weakness, light-headedness and headaches. Negative for facial asymmetry, speech difficulty and numbness.        Frequent falls   Hematological: Negative for adenopathy. Does not bruise/bleed easily.   Psychiatric/Behavioral: Positive for confusion and decreased concentration. Negative for sleep disturbance.       Objective:      Physical Exam  Constitutional:       Appearance: She is well-developed.   HENT:      Head: Normocephalic.      Right Ear: Ear canal and external ear normal. Tympanic membrane is retracted.      Left Ear: Ear canal and external ear normal. Tympanic membrane is retracted.      Nose: Septal deviation (To the left), mucosal edema (cyanotic, boggy inferior turbinates bilaterally) and rhinorrhea (clear mucus bilaterally) present.      Mouth/Throat:      Mouth: No oral lesions.      Pharynx: Uvula midline.   Eyes:      General: Lids are normal.         Right eye: No discharge.         Left eye: No discharge.      Conjunctiva/sclera:      Right eye: Right conjunctiva is injected. No exudate.     Left eye: Left conjunctiva is injected. No exudate.     Pupils: Pupils are equal, round, and reactive to light.   Neck:      Musculoskeletal: Normal range of motion. No muscular tenderness.      Thyroid: No thyroid mass or thyromegaly.      Trachea: Trachea normal. No tracheal deviation.   Cardiovascular:      Rate and Rhythm: Normal rate and regular rhythm.      Pulses: Normal pulses.      Heart sounds: Normal heart sounds.   Pulmonary:      Effort: Pulmonary effort is normal.      Breath sounds: Normal breath sounds. No stridor. No decreased breath sounds, wheezing, rhonchi or rales.   Abdominal:      General: Bowel sounds are normal.      Palpations: Abdomen is soft.       Tenderness: There is no abdominal tenderness.   Musculoskeletal: Normal range of motion.   Lymphadenopathy:      Head:      Right side of head: No submental, submandibular, preauricular, posterior auricular or occipital adenopathy.      Left side of head: No submental, submandibular, preauricular, posterior auricular or occipital adenopathy.      Cervical: No cervical adenopathy.   Skin:     General: Skin is warm and dry.      Findings: No petechiae or rash.      Nails: There is no clubbing.     Neurological:      Mental Status: She is alert and oriented to person, place, and time.      Cranial Nerves: No cranial nerve deficit.      Sensory: No sensory deficit.      Gait: Gait normal.      Comments: Neuro otologic-wide-based gait, no nystagmus   Psychiatric:         Speech: Speech normal.         Behavior: Behavior normal. Behavior is cooperative.         Thought Content: Thought content normal.         Judgment: Judgment normal.         Flexible video nasolaryngoscopy-nasal changes of nasal septal deviation and allergic rhinitis; laryngeal changes show decrease in erythema and edema in the post glottic structures, consistent with laryngopharyngeal reflux that is responding to therapy.    Assessment:       1. Dysphagia, unspecified type    2. Laryngopharyngeal reflux (LPR)    3. Chronic throat clearing    4. Allergic rhinitis, unspecified seasonality, unspecified trigger    5. Nasal septal deviation    6. History of intracranial hemorrhage    7. Impaired functional mobility, balance, and endurance        Plan:        I  will have the patient continue with the current drug regimen.  She will continue with her vestibular rehabilitative therapy as well.  I will recheck her in 3 months, or sooner on an as-needed basis.

## 2020-07-21 ENCOUNTER — TELEPHONE (OUTPATIENT)
Dept: OTOLARYNGOLOGY | Facility: CLINIC | Age: 65
End: 2020-07-21

## 2020-07-21 NOTE — TELEPHONE ENCOUNTER
Returned call to pt's . Provided him with the number to the billing department because he inquired about cost of care.         ----- Message from Rosario Maciel, Patient Care Assistant sent at 7/21/2020 10:49 AM CDT -----  Name of Who is Calling: (Otf) Spouse    What is the request in detail: Requesting a call back. Please contact to further discuss and advise      Can the clinic reply by [facility]: No    [facility]  [phone]

## 2020-07-24 DIAGNOSIS — Z12.11 SCREENING FOR COLON CANCER: Primary | ICD-10-CM

## 2020-09-11 ENCOUNTER — TELEPHONE (OUTPATIENT)
Dept: OTOLARYNGOLOGY | Facility: CLINIC | Age: 65
End: 2020-09-11

## 2020-09-14 ENCOUNTER — TELEPHONE (OUTPATIENT)
Dept: FAMILY MEDICINE | Facility: CLINIC | Age: 65
End: 2020-09-14

## 2020-09-14 DIAGNOSIS — Z12.39 SCREENING FOR BREAST CANCER: Primary | ICD-10-CM

## 2020-09-14 NOTE — TELEPHONE ENCOUNTER
----- Message from Alice Abreu sent at 9/14/2020  4:24 PM CDT -----  Regarding: mammo order  Contact: pt  Pt is requesting a call regarding having a mammo sent over     Pt can be reached at [phone]

## 2020-09-22 ENCOUNTER — HOSPITAL ENCOUNTER (OUTPATIENT)
Dept: RADIOLOGY | Facility: HOSPITAL | Age: 65
Discharge: HOME | End: 2020-09-22
Attending: FAMILY MEDICINE
Payer: COMMERCIAL

## 2020-09-22 DIAGNOSIS — Z12.39 SCREENING FOR BREAST CANCER: ICD-10-CM

## 2020-09-22 PROCEDURE — 77067 SCR MAMMO BI INCL CAD: CPT | Mod: 26,,, | Performed by: RADIOLOGY

## 2020-09-22 PROCEDURE — 77063 BREAST TOMOSYNTHESIS BI: CPT | Mod: 26,,, | Performed by: RADIOLOGY

## 2020-09-22 PROCEDURE — 77067 SCR MAMMO BI INCL CAD: CPT | Mod: TC,PN

## 2020-09-22 PROCEDURE — 77063 MAMMO DIGITAL SCREENING BILAT WITH TOMO: ICD-10-PCS | Mod: 26,,, | Performed by: RADIOLOGY

## 2020-09-22 PROCEDURE — 77067 MAMMO DIGITAL SCREENING BILAT WITH TOMO: ICD-10-PCS | Mod: 26,,, | Performed by: RADIOLOGY

## 2020-09-24 ENCOUNTER — PATIENT OUTREACH (OUTPATIENT)
Dept: INTERNAL MEDICINE | Facility: CLINIC | Age: 65
End: 2020-09-24

## 2020-09-24 PROBLEM — G45.9 TIA (TRANSIENT ISCHEMIC ATTACK): Status: ACTIVE | Noted: 2019-11-22

## 2020-09-24 PROBLEM — R47.9 DIFFICULTY WITH SPEECH: Status: ACTIVE | Noted: 2019-11-23

## 2020-09-25 ENCOUNTER — TELEPHONE (OUTPATIENT)
Dept: OTOLARYNGOLOGY | Facility: CLINIC | Age: 65
End: 2020-09-25

## 2020-09-25 NOTE — TELEPHONE ENCOUNTER
Called and spoke left message on   with a call back number [phone]. Advise them to contact Legal servise at [facility].

## 2020-09-25 NOTE — TELEPHONE ENCOUNTER
----- Message from ДМИТРИЙ Macdonald MD sent at 9/24/2020  3:05 PM CDT -----  Regarding: RE: Patient Advice  Her request was forwarded to legal department to determine how they want to handle this.    ----- Message -----  From: Jose Fitch MA  Sent: 9/23/2020   4:59 PM CDT  To: ДМИТРИЙ Macdonald MD  Subject: FW: Patient Advice                               Dr. Macdonald, we called about this message and spoke with Shira at the patient  office.  They are sending you another letter for her so you can write a short narrative   for patient past care and treatment from you.    ----- Message -----  From: Skyla Marte  Sent: 9/23/2020   2:55 PM CDT  To: [facility] Staff  Subject: Patient Advice                                   Name of Who is Calling:  Shira with  Jerel Vega's office      What is the request in detail:   Patient called requesting the status of the information mailed over on 08/25/2020 by  Otf Vega  for the above patient.  Please further advise          Call Back:  [phone]

## 2020-09-28 ENCOUNTER — TELEPHONE (OUTPATIENT)
Dept: OTOLARYNGOLOGY | Facility: CLINIC | Age: 65
End: 2020-09-28

## 2020-09-28 NOTE — TELEPHONE ENCOUNTER
Called and spoke with Shira letter her know we have not received the second letter sent to Dr. Macdonald.

## 2020-09-28 NOTE — TELEPHONE ENCOUNTER
----- Message from Rosario Maciel, Patient Care Assistant sent at 9/28/2020  2:20 PM CDT -----  Type:  Patient Returning Call    Who Called: BALWINDER URBANO [[id number]]    Who Left Message for Patient: Law Office    Does the patient know what this is regarding?:Yes    Best Call Back Number:[phone]    Additional Information:   None

## 2020-10-05 ENCOUNTER — PATIENT MESSAGE (OUTPATIENT)
Dept: INTERNAL MEDICINE | Facility: CLINIC | Age: 65
End: 2020-10-05

## 2020-10-06 ENCOUNTER — OFFICE VISIT (OUTPATIENT)
Dept: OTOLARYNGOLOGY | Facility: CLINIC | Age: 65
End: 2020-10-06
Payer: COMMERCIAL

## 2020-10-06 VITALS
HEART RATE: 49 BPM | HEIGHT: 63 IN | DIASTOLIC BLOOD PRESSURE: 85 MMHG | WEIGHT: 118.81 LBS | TEMPERATURE: 98 F | BODY MASS INDEX: 21.05 KG/M2 | SYSTOLIC BLOOD PRESSURE: 138 MMHG

## 2020-10-06 DIAGNOSIS — R13.10 DYSPHAGIA, UNSPECIFIED TYPE: Primary | ICD-10-CM

## 2020-10-06 DIAGNOSIS — R09.89 CHRONIC THROAT CLEARING: ICD-10-CM

## 2020-10-06 DIAGNOSIS — J30.9 ALLERGIC RHINITIS, UNSPECIFIED SEASONALITY, UNSPECIFIED TRIGGER: ICD-10-CM

## 2020-10-06 DIAGNOSIS — K21.9 LARYNGOPHARYNGEAL REFLUX (LPR): ICD-10-CM

## 2020-10-06 DIAGNOSIS — J34.2 NASAL SEPTAL DEVIATION: ICD-10-CM

## 2020-10-06 DIAGNOSIS — Z01.812 PRE-PROCEDURE LAB EXAM: ICD-10-CM

## 2020-10-06 PROCEDURE — 1126F PR PAIN SEVERITY QUANTIFIED, NO PAIN PRESENT: ICD-10-PCS | Mod: S$GLB,,, | Performed by: SPECIALIST

## 2020-10-06 PROCEDURE — 99214 PR OFFICE/OUTPT VISIT, EST, LEVL IV, 30-39 MIN: ICD-10-PCS | Mod: S$GLB,,, | Performed by: SPECIALIST

## 2020-10-06 PROCEDURE — 1126F AMNT PAIN NOTED NONE PRSNT: CPT | Mod: S$GLB,,, | Performed by: SPECIALIST

## 2020-10-06 PROCEDURE — 3075F PR MOST RECENT SYSTOLIC BLOOD PRESS GE 130-139MM HG: ICD-10-PCS | Mod: CPTII,S$GLB,, | Performed by: SPECIALIST

## 2020-10-06 PROCEDURE — 1101F PT FALLS ASSESS-DOCD LE1/YR: CPT | Mod: CPTII,S$GLB,, | Performed by: SPECIALIST

## 2020-10-06 PROCEDURE — 3079F DIAST BP 80-89 MM HG: CPT | Mod: CPTII,S$GLB,, | Performed by: SPECIALIST

## 2020-10-06 PROCEDURE — 99214 OFFICE O/P EST MOD 30 MIN: CPT | Mod: S$GLB,,, | Performed by: SPECIALIST

## 2020-10-06 PROCEDURE — 3008F BODY MASS INDEX DOCD: CPT | Mod: CPTII,S$GLB,, | Performed by: SPECIALIST

## 2020-10-06 PROCEDURE — 3075F SYST BP GE 130 - 139MM HG: CPT | Mod: CPTII,S$GLB,, | Performed by: SPECIALIST

## 2020-10-06 PROCEDURE — 3079F PR MOST RECENT DIASTOLIC BLOOD PRESSURE 80-89 MM HG: ICD-10-PCS | Mod: CPTII,S$GLB,, | Performed by: SPECIALIST

## 2020-10-06 PROCEDURE — 3008F PR BODY MASS INDEX (BMI) DOCUMENTED: ICD-10-PCS | Mod: CPTII,S$GLB,, | Performed by: SPECIALIST

## 2020-10-06 PROCEDURE — 1159F PR MEDICATION LIST DOCUMENTED IN MEDICAL RECORD: ICD-10-PCS | Mod: S$GLB,,, | Performed by: SPECIALIST

## 2020-10-06 PROCEDURE — 1159F MED LIST DOCD IN RCRD: CPT | Mod: S$GLB,,, | Performed by: SPECIALIST

## 2020-10-06 PROCEDURE — 1101F PR PT FALLS ASSESS DOC 0-1 FALLS W/OUT INJ PAST YR: ICD-10-PCS | Mod: CPTII,S$GLB,, | Performed by: SPECIALIST
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
## 2020-10-07 ENCOUNTER — TELEPHONE (OUTPATIENT)
Dept: OTOLARYNGOLOGY | Facility: CLINIC | Age: 65
End: 2020-10-07

## 2020-10-07 NOTE — TELEPHONE ENCOUNTER
----- Message from Lauraamauri David sent at 10/7/2020  3:04 PM CDT -----  Name of Who is Calling: Beronica Dominguez Dr office     What is the request in detail: Beronica Dominguez Dr office is requesting a call back regards to the pt ..Please contact to further discuss and advise      Can the clinic reply by [facility]: No     What Number to Call Back if not in [facility]:  [phone]  Can the clinic reply in [facility]:

## 2020-10-07 NOTE — TELEPHONE ENCOUNTER
Called and spoke with Tiara in Legal department about pt Shira Vega but patient was seen with [employer] .

## 2020-10-14 ENCOUNTER — TELEPHONE (OUTPATIENT)
Dept: FAMILY MEDICINE | Facility: CLINIC | Age: 65
End: 2020-10-14

## 2020-10-14 NOTE — TELEPHONE ENCOUNTER
The patient had an elevated on his/her last office visit.  The patient was contacted about scheduling a follow up  blood pressure nurse/PCP visit. She has a recent blood pressure from 10/6/20 and it was 138/85.    Anh GONZALEZ  Clinical Care Coordinator  Internal Medicine  [facility]  [phone]

## 2020-10-22 RX ORDER — LEVOTHYROXINE SODIUM 50 UG/1
TABLET ORAL
Qty: 90 TABLET | Refills: 3 | Status: SHIPPED | OUTPATIENT
Start: 2020-10-22 | End: 2021-10-04 | Stop reason: SDUPTHER

## 2020-10-22 NOTE — TELEPHONE ENCOUNTER
Please contact patient to remind that it has been more than 1 year since her last visit the office.  Please schedule a return visit before the end of the calendar year.

## 2020-10-23 DIAGNOSIS — Z12.12 SCREENING FOR COLORECTAL CANCER: Primary | ICD-10-CM

## 2020-10-23 DIAGNOSIS — Z12.11 SCREENING FOR COLORECTAL CANCER: Primary | ICD-10-CM
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
## 2020-11-02 ENCOUNTER — LAB VISIT (OUTPATIENT)
Dept: LAB | Facility: HOSPITAL | Age: 65
End: 2020-11-02
Attending: FAMILY MEDICINE
Payer: COMMERCIAL

## 2020-11-02 DIAGNOSIS — Z12.11 SCREENING FOR COLORECTAL CANCER: ICD-10-CM

## 2020-11-02 DIAGNOSIS — Z12.12 SCREENING FOR COLORECTAL CANCER: ICD-10-CM

## 2020-11-02 PROCEDURE — 82274 ASSAY TEST FOR BLOOD FECAL: CPT

## 2020-11-03 ENCOUNTER — PATIENT MESSAGE (OUTPATIENT)
Dept: FAMILY MEDICINE | Facility: CLINIC | Age: 65
End: 2020-11-03

## 2020-11-03 LAB — HEMOCCULT STL QL IA: POSITIVE

## 2020-11-04 ENCOUNTER — OFFICE VISIT (OUTPATIENT)
Dept: FAMILY MEDICINE | Facility: CLINIC | Age: 65
End: 2020-11-04
Attending: FAMILY MEDICINE
Payer: COMMERCIAL

## 2020-11-04 VITALS
BODY MASS INDEX: 20.91 KG/M2 | OXYGEN SATURATION: 99 % | DIASTOLIC BLOOD PRESSURE: 70 MMHG | HEART RATE: 58 BPM | WEIGHT: 118 LBS | SYSTOLIC BLOOD PRESSURE: 122 MMHG | HEIGHT: 63 IN

## 2020-11-04 DIAGNOSIS — Z00.00 ROUTINE GENERAL MEDICAL EXAMINATION AT A HEALTH CARE FACILITY: Primary | ICD-10-CM

## 2020-11-04 DIAGNOSIS — Z87.81 HISTORY OF SKULL FRACTURE: ICD-10-CM

## 2020-11-04 DIAGNOSIS — K21.9 LARYNGOPHARYNGEAL REFLUX (LPR): ICD-10-CM

## 2020-11-04 DIAGNOSIS — Z86.79 HISTORY OF INTRACRANIAL HEMORRHAGE: ICD-10-CM

## 2020-11-04 DIAGNOSIS — E78.5 HYPERLIPIDEMIA, UNSPECIFIED HYPERLIPIDEMIA TYPE: ICD-10-CM

## 2020-11-04 DIAGNOSIS — Z74.09 IMPAIRED FUNCTIONAL MOBILITY, BALANCE, AND ENDURANCE: ICD-10-CM

## 2020-11-04 DIAGNOSIS — I10 HTN (HYPERTENSION), BENIGN: ICD-10-CM

## 2020-11-04 DIAGNOSIS — E03.9 HYPOTHYROIDISM, UNSPECIFIED TYPE: ICD-10-CM

## 2020-11-04 DIAGNOSIS — Z12.11 COLON CANCER SCREENING: ICD-10-CM

## 2020-11-04 DIAGNOSIS — R19.5 FECAL OCCULT BLOOD TEST POSITIVE: ICD-10-CM

## 2020-11-04 PROCEDURE — 90472 IMMUNIZATION ADMIN EACH ADD: CPT | Mod: S$GLB,,, | Performed by: FAMILY MEDICINE

## 2020-11-04 PROCEDURE — 3074F PR MOST RECENT SYSTOLIC BLOOD PRESSURE < 130 MM HG: ICD-10-PCS | Mod: CPTII,S$GLB,, | Performed by: FAMILY MEDICINE

## 2020-11-04 PROCEDURE — 90732 PNEUMOCOCCAL POLYSACCHARIDE VACCINE 23-VALENT =>2YO SQ IM: ICD-10-PCS | Mod: S$GLB,,, | Performed by: FAMILY MEDICINE

## 2020-11-04 PROCEDURE — 3078F PR MOST RECENT DIASTOLIC BLOOD PRESSURE < 80 MM HG: ICD-10-PCS | Mod: CPTII,S$GLB,, | Performed by: FAMILY MEDICINE

## 2020-11-04 PROCEDURE — 90694 FLU VACCINE - QUADRIVALENT - ADJUVANTED: ICD-10-PCS | Mod: S$GLB,,, | Performed by: FAMILY MEDICINE

## 2020-11-04 PROCEDURE — 99999 PR PBB SHADOW E&M-EST. PATIENT-LVL III: ICD-10-PCS | Mod: PBBFAC,,, | Performed by: FAMILY MEDICINE

## 2020-11-04 PROCEDURE — 3078F DIAST BP <80 MM HG: CPT | Mod: CPTII,S$GLB,, | Performed by: FAMILY MEDICINE

## 2020-11-04 PROCEDURE — 99999 PR PBB SHADOW E&M-EST. PATIENT-LVL III: CPT | Mod: PBBFAC,,, | Performed by: FAMILY MEDICINE

## 2020-11-04 PROCEDURE — 90694 VACC AIIV4 NO PRSRV 0.5ML IM: CPT | Mod: S$GLB,,, | Performed by: FAMILY MEDICINE

## 2020-11-04 PROCEDURE — 3074F SYST BP LT 130 MM HG: CPT | Mod: CPTII,S$GLB,, | Performed by: FAMILY MEDICINE

## 2020-11-04 PROCEDURE — 90472 FLU VACCINE - QUADRIVALENT - ADJUVANTED: ICD-10-PCS | Mod: S$GLB,,, | Performed by: FAMILY MEDICINE

## 2020-11-04 PROCEDURE — 90471 IMMUNIZATION ADMIN: CPT | Mod: S$GLB,,, | Performed by: FAMILY MEDICINE

## 2020-11-04 PROCEDURE — 90471 PNEUMOCOCCAL POLYSACCHARIDE VACCINE 23-VALENT =>2YO SQ IM: ICD-10-PCS | Mod: S$GLB,,, | Performed by: FAMILY MEDICINE

## 2020-11-04 PROCEDURE — 99397 PR PREVENTIVE VISIT,EST,65 & OVER: ICD-10-PCS | Mod: 25,S$GLB,, | Performed by: FAMILY MEDICINE

## 2020-11-04 PROCEDURE — 99397 PER PM REEVAL EST PAT 65+ YR: CPT | Mod: 25,S$GLB,, | Performed by: FAMILY MEDICINE

## 2020-11-04 PROCEDURE — 90732 PPSV23 VACC 2 YRS+ SUBQ/IM: CPT | Mod: S$GLB,,, | Performed by: FAMILY MEDICINE

## 2020-11-04 NOTE — PROGRESS NOTES
Patient was given Pneumococcal 23 IM in Left Deltoid and Influenza High Dose IM in Right Deltoid as per orders from MD. Aseptic tech used and pt tolerated well. Pt was monitored for 15 mins with no reaction noted.

## 2020-11-06 ENCOUNTER — TELEPHONE (OUTPATIENT)
Dept: FAMILY MEDICINE | Facility: CLINIC | Age: 65
End: 2020-11-06

## 2020-11-06 NOTE — TELEPHONE ENCOUNTER
The patient had an elevated on his/her last office visit.  The patient was contacted about scheduling a follow up  blood pressure nurse/PCP visit. Last visit was on 11/4/20 and her bp was 122/70.    Anh GONZALEZ  Clinical Care Coordinator  Internal Medicine  [facility]  [phone]

## 2020-11-10 ENCOUNTER — LAB VISIT (OUTPATIENT)
Dept: LAB | Facility: HOSPITAL | Age: 65
End: 2020-11-10
Attending: FAMILY MEDICINE
Payer: COMMERCIAL

## 2020-11-10 ENCOUNTER — PATIENT MESSAGE (OUTPATIENT)
Dept: FAMILY MEDICINE | Facility: CLINIC | Age: 65
End: 2020-11-10

## 2020-11-10 DIAGNOSIS — E78.5 HYPERLIPIDEMIA, UNSPECIFIED HYPERLIPIDEMIA TYPE: ICD-10-CM

## 2020-11-10 DIAGNOSIS — I10 HTN (HYPERTENSION), BENIGN: ICD-10-CM

## 2020-11-10 DIAGNOSIS — E03.9 HYPOTHYROIDISM, UNSPECIFIED TYPE: ICD-10-CM

## 2020-11-10 LAB
ALBUMIN SERPL BCP-MCNC: 3.8 G/DL (ref 3.5–5.2)
ALP SERPL-CCNC: 78 U/L (ref 55–135)
ALT SERPL W/O P-5'-P-CCNC: 8 U/L (ref 10–44)
ANION GAP SERPL CALC-SCNC: 9 MMOL/L (ref 8–16)
AST SERPL-CCNC: 16 U/L (ref 10–40)
BILIRUB SERPL-MCNC: 0.3 MG/DL (ref 0.1–1)
BUN SERPL-MCNC: 17 MG/DL (ref 8–23)
CALCIUM SERPL-MCNC: 9.7 MG/DL (ref 8.7–10.5)
CHLORIDE SERPL-SCNC: 110 MMOL/L (ref 95–110)
CHOLEST SERPL-MCNC: 187 MG/DL (ref 120–199)
CHOLEST/HDLC SERPL: 3.1 {RATIO} (ref 2–5)
CO2 SERPL-SCNC: 24 MMOL/L (ref 23–29)
CREAT SERPL-MCNC: 1 MG/DL (ref 0.5–1.4)
EST. GFR  (AFRICAN AMERICAN): >60 ML/MIN/1.73 M^2
EST. GFR  (NON AFRICAN AMERICAN): 58.4 ML/MIN/1.73 M^2
GLUCOSE SERPL-MCNC: 84 MG/DL (ref 70–110)
HDLC SERPL-MCNC: 60 MG/DL (ref 40–75)
HDLC SERPL: 32.1 % (ref 20–50)
LDLC SERPL CALC-MCNC: 100.8 MG/DL (ref 63–159)
NONHDLC SERPL-MCNC: 127 MG/DL
POTASSIUM SERPL-SCNC: 5 MMOL/L (ref 3.5–5.1)
PROT SERPL-MCNC: 7.6 G/DL (ref 6–8.4)
SODIUM SERPL-SCNC: 143 MMOL/L (ref 136–145)
T4 FREE SERPL-MCNC: 0.75 NG/DL (ref 0.71–1.51)
TRIGL SERPL-MCNC: 131 MG/DL (ref 30–150)
TSH SERPL DL<=0.005 MIU/L-ACNC: 1.3 UIU/ML (ref 0.4–4)

## 2020-11-10 PROCEDURE — 80053 COMPREHEN METABOLIC PANEL: CPT

## 2020-11-10 PROCEDURE — 84439 ASSAY OF FREE THYROXINE: CPT

## 2020-11-10 PROCEDURE — 80061 LIPID PANEL: CPT

## 2020-11-10 PROCEDURE — 36415 COLL VENOUS BLD VENIPUNCTURE: CPT | Mod: PO

## 2020-11-10 PROCEDURE — 84443 ASSAY THYROID STIM HORMONE: CPT

## 2020-11-13 ENCOUNTER — PATIENT MESSAGE (OUTPATIENT)
Dept: FAMILY MEDICINE | Facility: CLINIC | Age: 65
End: 2020-11-13

## 2020-11-19 ENCOUNTER — NURSE TRIAGE (OUTPATIENT)
Dept: ADMINISTRATIVE | Facility: CLINIC | Age: 65
End: 2020-11-19

## 2020-11-20 NOTE — TELEPHONE ENCOUNTER
calling for rectal bleeding x 2 hours. Bleeding has not stopped. Patient had a colon poop test 3 weeks ago and everything was negative. Bleeding is bright red in toilet water and on the floor.     Patient not having dizziness currently but reports constant rectal bleeding x 2 hours.   BY passed asking triage questions since dispo will be that patient has to see an MD tonight.   Reason for Disposition   SEVERE rectal bleeding (large blood clots; on and off, or constant bleeding)    Protocols used: RECTAL BLEEDING-A-AH

## 2020-11-23 ENCOUNTER — TELEPHONE (OUTPATIENT)
Dept: FAMILY MEDICINE | Facility: CLINIC | Age: 65
End: 2020-11-23

## 2020-11-23 NOTE — TELEPHONE ENCOUNTER
----- Message from Jenelle Lainez sent at 11/23/2020  3:55 PM CST -----  Regarding: Callback  Name of Who is Calling: Otf Vega-   What is the request in detail: Pt's  is requesting a callback concerning pt healthy she Can the clinic reply by [facility]: NO  What [facility] [phone]

## 2020-11-23 NOTE — TELEPHONE ENCOUNTER
Spoke with the patient. Patient states he would like to speak with Dr. Barragan in regards to the patient's health. Patient's  was informed that Dr. Barragan is currently still in clinic and will contact once he has the chance.

## 2020-12-16 RX ORDER — FAMOTIDINE 20 MG/1
20 TABLET, FILM COATED ORAL 2 TIMES DAILY
Qty: 60 TABLET | Refills: 11 | Status: SHIPPED | OUTPATIENT
Start: 2020-12-16 | End: 2021-12-17 | Stop reason: SDUPTHER

## 2020-12-30 ENCOUNTER — TELEPHONE (OUTPATIENT)
Dept: OTOLARYNGOLOGY | Facility: CLINIC | Age: 65
End: 2020-12-30

## 2020-12-30 RX ORDER — FAMOTIDINE 20 MG/1
20 TABLET, FILM COATED ORAL 2 TIMES DAILY
Qty: 60 TABLET | Refills: 11 | Status: CANCELLED | OUTPATIENT
Start: 2020-12-30 | End: 2021-12-30

## 2021-01-04 ENCOUNTER — PATIENT MESSAGE (OUTPATIENT)
Dept: OTOLARYNGOLOGY | Facility: CLINIC | Age: 66
End: 2021-01-04

## 2021-01-11 ENCOUNTER — LAB VISIT (OUTPATIENT)
Dept: PRIMARY CARE CLINIC | Facility: CLINIC | Age: 66
End: 2021-01-11
Payer: COMMERCIAL

## 2021-01-11 DIAGNOSIS — Z01.812 PRE-PROCEDURE LAB EXAM: ICD-10-CM

## 2021-01-11 PROCEDURE — U0003 INFECTIOUS AGENT DETECTION BY NUCLEIC ACID (DNA OR RNA); SEVERE ACUTE RESPIRATORY SYNDROME CORONAVIRUS 2 (SARS-COV-2) (CORONAVIRUS DISEASE [COVID-19]), AMPLIFIED PROBE TECHNIQUE, MAKING USE OF HIGH THROUGHPUT TECHNOLOGIES AS DESCRIBED BY CMS-2020-01-R: HCPCS

## 2021-01-12 LAB — SARS-COV-2 RNA RESP QL NAA+PROBE: NOT DETECTED

## 2021-01-14 ENCOUNTER — OFFICE VISIT (OUTPATIENT)
Dept: OTOLARYNGOLOGY | Facility: CLINIC | Age: 66
End: 2021-01-14
Payer: COMMERCIAL

## 2021-01-14 VITALS
HEIGHT: 63 IN | HEART RATE: 80 BPM | DIASTOLIC BLOOD PRESSURE: 80 MMHG | SYSTOLIC BLOOD PRESSURE: 138 MMHG | BODY MASS INDEX: 20.36 KG/M2 | TEMPERATURE: 98 F | WEIGHT: 114.88 LBS

## 2021-01-14 DIAGNOSIS — Z74.09 IMPAIRED FUNCTIONAL MOBILITY, BALANCE, AND ENDURANCE: ICD-10-CM

## 2021-01-14 DIAGNOSIS — J34.2 NASAL SEPTAL DEVIATION: ICD-10-CM

## 2021-01-14 DIAGNOSIS — R13.10 DYSPHAGIA, UNSPECIFIED TYPE: Primary | ICD-10-CM

## 2021-01-14 DIAGNOSIS — J30.9 ALLERGIC RHINITIS, UNSPECIFIED SEASONALITY, UNSPECIFIED TRIGGER: ICD-10-CM

## 2021-01-14 DIAGNOSIS — K21.9 LARYNGOPHARYNGEAL REFLUX (LPR): ICD-10-CM

## 2021-01-14 DIAGNOSIS — R49.0 HOARSENESS: ICD-10-CM

## 2021-01-14 PROCEDURE — 1159F MED LIST DOCD IN RCRD: CPT | Mod: S$GLB,,, | Performed by: SPECIALIST

## 2021-01-14 PROCEDURE — 3075F SYST BP GE 130 - 139MM HG: CPT | Mod: CPTII,S$GLB,, | Performed by: SPECIALIST

## 2021-01-14 PROCEDURE — 3288F FALL RISK ASSESSMENT DOCD: CPT | Mod: CPTII,S$GLB,, | Performed by: SPECIALIST

## 2021-01-14 PROCEDURE — 3075F PR MOST RECENT SYSTOLIC BLOOD PRESS GE 130-139MM HG: ICD-10-PCS | Mod: CPTII,S$GLB,, | Performed by: SPECIALIST

## 2021-01-14 PROCEDURE — 31575 LARYNGOSCOPY: ICD-10-PCS | Mod: S$GLB,,, | Performed by: SPECIALIST

## 2021-01-14 PROCEDURE — 99214 OFFICE O/P EST MOD 30 MIN: CPT | Mod: 25,S$GLB,, | Performed by: SPECIALIST

## 2021-01-14 PROCEDURE — 3008F BODY MASS INDEX DOCD: CPT | Mod: CPTII,S$GLB,, | Performed by: SPECIALIST

## 2021-01-14 PROCEDURE — 1101F PR PT FALLS ASSESS DOC 0-1 FALLS W/OUT INJ PAST YR: ICD-10-PCS | Mod: CPTII,S$GLB,, | Performed by: SPECIALIST

## 2021-01-14 PROCEDURE — 99999 PR PBB SHADOW E&M-EST. PATIENT-LVL IV: ICD-10-PCS | Mod: PBBFAC,,, | Performed by: SPECIALIST

## 2021-01-14 PROCEDURE — 1126F AMNT PAIN NOTED NONE PRSNT: CPT | Mod: S$GLB,,, | Performed by: SPECIALIST

## 2021-01-14 PROCEDURE — 99214 PR OFFICE/OUTPT VISIT, EST, LEVL IV, 30-39 MIN: ICD-10-PCS | Mod: 25,S$GLB,, | Performed by: SPECIALIST

## 2021-01-14 PROCEDURE — 31575 DIAGNOSTIC LARYNGOSCOPY: CPT | Mod: S$GLB,,, | Performed by: SPECIALIST

## 2021-01-14 PROCEDURE — 99999 PR PBB SHADOW E&M-EST. PATIENT-LVL IV: CPT | Mod: PBBFAC,,, | Performed by: SPECIALIST

## 2021-01-14 PROCEDURE — 1126F PR PAIN SEVERITY QUANTIFIED, NO PAIN PRESENT: ICD-10-PCS | Mod: S$GLB,,, | Performed by: SPECIALIST

## 2021-01-14 PROCEDURE — 3008F PR BODY MASS INDEX (BMI) DOCUMENTED: ICD-10-PCS | Mod: CPTII,S$GLB,, | Performed by: SPECIALIST

## 2021-01-14 PROCEDURE — 3079F PR MOST RECENT DIASTOLIC BLOOD PRESSURE 80-89 MM HG: ICD-10-PCS | Mod: CPTII,S$GLB,, | Performed by: SPECIALIST

## 2021-01-14 PROCEDURE — 3079F DIAST BP 80-89 MM HG: CPT | Mod: CPTII,S$GLB,, | Performed by: SPECIALIST

## 2021-01-14 PROCEDURE — 3288F PR FALLS RISK ASSESSMENT DOCUMENTED: ICD-10-PCS | Mod: CPTII,S$GLB,, | Performed by: SPECIALIST

## 2021-01-14 PROCEDURE — 1101F PT FALLS ASSESS-DOCD LE1/YR: CPT | Mod: CPTII,S$GLB,, | Performed by: SPECIALIST

## 2021-01-14 PROCEDURE — 1159F PR MEDICATION LIST DOCUMENTED IN MEDICAL RECORD: ICD-10-PCS | Mod: S$GLB,,, | Performed by: SPECIALIST

## 2021-02-05 ENCOUNTER — PATIENT OUTREACH (OUTPATIENT)
Dept: ADMINISTRATIVE | Facility: HOSPITAL | Age: 66
End: 2021-02-05

## 2021-04-06 ENCOUNTER — PATIENT MESSAGE (OUTPATIENT)
Dept: FAMILY MEDICINE | Facility: CLINIC | Age: 66
End: 2021-04-06

## 2021-04-14 PROBLEM — K63.89 COLONIC MASS: Status: ACTIVE | Noted: 2021-04-14

## 2021-04-14 PROBLEM — Z87.820 HISTORY OF TRAUMATIC BRAIN INJURY: Status: ACTIVE | Noted: 2021-04-14

## 2021-04-14 PROBLEM — K57.30 DIVERTICULOSIS OF LARGE INTESTINE WITHOUT HEMORRHAGE: Status: ACTIVE | Noted: 2021-04-14

## 2021-04-15 ENCOUNTER — OFFICE VISIT (OUTPATIENT)
Dept: OTOLARYNGOLOGY | Facility: CLINIC | Age: 66
End: 2021-04-15
Payer: COMMERCIAL

## 2021-04-15 VITALS
HEART RATE: 61 BPM | BODY MASS INDEX: 20.13 KG/M2 | TEMPERATURE: 98 F | DIASTOLIC BLOOD PRESSURE: 63 MMHG | SYSTOLIC BLOOD PRESSURE: 138 MMHG | WEIGHT: 113.63 LBS

## 2021-04-15 DIAGNOSIS — R51.9 NONINTRACTABLE EPISODIC HEADACHE, UNSPECIFIED HEADACHE TYPE: ICD-10-CM

## 2021-04-15 DIAGNOSIS — R13.10 DYSPHAGIA, UNSPECIFIED TYPE: Primary | ICD-10-CM

## 2021-04-15 DIAGNOSIS — J34.2 NASAL SEPTAL DEVIATION: ICD-10-CM

## 2021-04-15 DIAGNOSIS — Z74.09 IMPAIRED FUNCTIONAL MOBILITY, BALANCE, AND ENDURANCE: ICD-10-CM

## 2021-04-15 DIAGNOSIS — K21.9 LARYNGOPHARYNGEAL REFLUX (LPR): ICD-10-CM

## 2021-04-15 DIAGNOSIS — J30.9 ALLERGIC RHINITIS, UNSPECIFIED SEASONALITY, UNSPECIFIED TRIGGER: ICD-10-CM

## 2021-04-15 DIAGNOSIS — R49.0 HOARSENESS: ICD-10-CM

## 2021-04-15 PROCEDURE — 99213 OFFICE O/P EST LOW 20 MIN: CPT | Mod: S$GLB,,, | Performed by: SPECIALIST

## 2021-04-15 PROCEDURE — 1101F PT FALLS ASSESS-DOCD LE1/YR: CPT | Mod: CPTII,S$GLB,, | Performed by: SPECIALIST

## 2021-04-15 PROCEDURE — 3008F BODY MASS INDEX DOCD: CPT | Mod: CPTII,S$GLB,, | Performed by: SPECIALIST

## 2021-04-15 PROCEDURE — 3008F PR BODY MASS INDEX (BMI) DOCUMENTED: ICD-10-PCS | Mod: CPTII,S$GLB,, | Performed by: SPECIALIST

## 2021-04-15 PROCEDURE — 1159F MED LIST DOCD IN RCRD: CPT | Mod: S$GLB,,, | Performed by: SPECIALIST

## 2021-04-15 PROCEDURE — 1101F PR PT FALLS ASSESS DOC 0-1 FALLS W/OUT INJ PAST YR: ICD-10-PCS | Mod: CPTII,S$GLB,, | Performed by: SPECIALIST

## 2021-04-15 PROCEDURE — 3288F FALL RISK ASSESSMENT DOCD: CPT | Mod: CPTII,S$GLB,, | Performed by: SPECIALIST

## 2021-04-15 PROCEDURE — 99213 PR OFFICE/OUTPT VISIT, EST, LEVL III, 20-29 MIN: ICD-10-PCS | Mod: S$GLB,,, | Performed by: SPECIALIST

## 2021-04-15 PROCEDURE — 99999 PR PBB SHADOW E&M-EST. PATIENT-LVL III: CPT | Mod: PBBFAC,,, | Performed by: SPECIALIST

## 2021-04-15 PROCEDURE — 1126F AMNT PAIN NOTED NONE PRSNT: CPT | Mod: S$GLB,,, | Performed by: SPECIALIST

## 2021-04-15 PROCEDURE — 1159F PR MEDICATION LIST DOCUMENTED IN MEDICAL RECORD: ICD-10-PCS | Mod: S$GLB,,, | Performed by: SPECIALIST

## 2021-04-15 PROCEDURE — 1126F PR PAIN SEVERITY QUANTIFIED, NO PAIN PRESENT: ICD-10-PCS | Mod: S$GLB,,, | Performed by: SPECIALIST

## 2021-04-15 PROCEDURE — 3288F PR FALLS RISK ASSESSMENT DOCUMENTED: ICD-10-PCS | Mod: CPTII,S$GLB,, | Performed by: SPECIALIST

## 2021-04-15 PROCEDURE — 99999 PR PBB SHADOW E&M-EST. PATIENT-LVL III: ICD-10-PCS | Mod: PBBFAC,,, | Performed by: SPECIALIST

## 2021-04-19 ENCOUNTER — LAB VISIT (OUTPATIENT)
Dept: LAB | Facility: HOSPITAL | Age: 66
End: 2021-04-19
Attending: FAMILY MEDICINE
Payer: COMMERCIAL

## 2021-04-19 ENCOUNTER — OFFICE VISIT (OUTPATIENT)
Dept: FAMILY MEDICINE | Facility: CLINIC | Age: 66
End: 2021-04-19
Attending: FAMILY MEDICINE
Payer: COMMERCIAL

## 2021-04-19 VITALS
WEIGHT: 113.5 LBS | OXYGEN SATURATION: 100 % | DIASTOLIC BLOOD PRESSURE: 78 MMHG | HEIGHT: 63 IN | BODY MASS INDEX: 20.11 KG/M2 | HEART RATE: 55 BPM | SYSTOLIC BLOOD PRESSURE: 138 MMHG

## 2021-04-19 DIAGNOSIS — E03.9 HYPOTHYROIDISM, UNSPECIFIED TYPE: ICD-10-CM

## 2021-04-19 DIAGNOSIS — E78.5 HYPERLIPIDEMIA, UNSPECIFIED HYPERLIPIDEMIA TYPE: ICD-10-CM

## 2021-04-19 DIAGNOSIS — I10 HTN (HYPERTENSION), BENIGN: ICD-10-CM

## 2021-04-19 DIAGNOSIS — Z87.820 HISTORY OF TRAUMATIC BRAIN INJURY: ICD-10-CM

## 2021-04-19 DIAGNOSIS — I10 HTN (HYPERTENSION), BENIGN: Primary | ICD-10-CM

## 2021-04-19 DIAGNOSIS — Z74.09 IMPAIRED FUNCTIONAL MOBILITY, BALANCE, AND ENDURANCE: ICD-10-CM

## 2021-04-19 DIAGNOSIS — D12.6 TUBULOVILLOUS ADENOMA OF COLON: ICD-10-CM

## 2021-04-19 LAB
ANION GAP SERPL CALC-SCNC: 9 MMOL/L (ref 8–16)
BUN SERPL-MCNC: 17 MG/DL (ref 8–23)
CALCIUM SERPL-MCNC: 9.8 MG/DL (ref 8.7–10.5)
CHLORIDE SERPL-SCNC: 111 MMOL/L (ref 95–110)
CO2 SERPL-SCNC: 25 MMOL/L (ref 23–29)
CREAT SERPL-MCNC: 0.9 MG/DL (ref 0.5–1.4)
EST. GFR  (AFRICAN AMERICAN): >60 ML/MIN/1.73 M^2
EST. GFR  (NON AFRICAN AMERICAN): >60 ML/MIN/1.73 M^2
GLUCOSE SERPL-MCNC: 70 MG/DL (ref 70–110)
POTASSIUM SERPL-SCNC: 4.7 MMOL/L (ref 3.5–5.1)
SODIUM SERPL-SCNC: 145 MMOL/L (ref 136–145)
T4 FREE SERPL-MCNC: 0.85 NG/DL (ref 0.71–1.51)
TSH SERPL DL<=0.005 MIU/L-ACNC: 0.85 UIU/ML (ref 0.4–4)

## 2021-04-19 PROCEDURE — 1126F AMNT PAIN NOTED NONE PRSNT: CPT | Mod: S$GLB,,, | Performed by: FAMILY MEDICINE

## 2021-04-19 PROCEDURE — 1159F MED LIST DOCD IN RCRD: CPT | Mod: S$GLB,,, | Performed by: FAMILY MEDICINE

## 2021-04-19 PROCEDURE — 84439 ASSAY OF FREE THYROXINE: CPT | Performed by: FAMILY MEDICINE

## 2021-04-19 PROCEDURE — 3075F PR MOST RECENT SYSTOLIC BLOOD PRESS GE 130-139MM HG: ICD-10-PCS | Mod: CPTII,S$GLB,, | Performed by: FAMILY MEDICINE

## 2021-04-19 PROCEDURE — 36415 COLL VENOUS BLD VENIPUNCTURE: CPT | Mod: PO | Performed by: FAMILY MEDICINE

## 2021-04-19 PROCEDURE — 99999 PR PBB SHADOW E&M-EST. PATIENT-LVL IV: ICD-10-PCS | Mod: PBBFAC,,, | Performed by: FAMILY MEDICINE

## 2021-04-19 PROCEDURE — 3288F PR FALLS RISK ASSESSMENT DOCUMENTED: ICD-10-PCS | Mod: CPTII,S$GLB,, | Performed by: FAMILY MEDICINE

## 2021-04-19 PROCEDURE — 99214 OFFICE O/P EST MOD 30 MIN: CPT | Mod: S$GLB,,, | Performed by: FAMILY MEDICINE

## 2021-04-19 PROCEDURE — 1101F PT FALLS ASSESS-DOCD LE1/YR: CPT | Mod: CPTII,S$GLB,, | Performed by: FAMILY MEDICINE

## 2021-04-19 PROCEDURE — 1159F PR MEDICATION LIST DOCUMENTED IN MEDICAL RECORD: ICD-10-PCS | Mod: S$GLB,,, | Performed by: FAMILY MEDICINE

## 2021-04-19 PROCEDURE — 3078F PR MOST RECENT DIASTOLIC BLOOD PRESSURE < 80 MM HG: ICD-10-PCS | Mod: CPTII,S$GLB,, | Performed by: FAMILY MEDICINE

## 2021-04-19 PROCEDURE — 3288F FALL RISK ASSESSMENT DOCD: CPT | Mod: CPTII,S$GLB,, | Performed by: FAMILY MEDICINE

## 2021-04-19 PROCEDURE — 1101F PR PT FALLS ASSESS DOC 0-1 FALLS W/OUT INJ PAST YR: ICD-10-PCS | Mod: CPTII,S$GLB,, | Performed by: FAMILY MEDICINE

## 2021-04-19 PROCEDURE — 1126F PR PAIN SEVERITY QUANTIFIED, NO PAIN PRESENT: ICD-10-PCS | Mod: S$GLB,,, | Performed by: FAMILY MEDICINE

## 2021-04-19 PROCEDURE — 84443 ASSAY THYROID STIM HORMONE: CPT | Performed by: FAMILY MEDICINE

## 2021-04-19 PROCEDURE — 99999 PR PBB SHADOW E&M-EST. PATIENT-LVL IV: CPT | Mod: PBBFAC,,, | Performed by: FAMILY MEDICINE

## 2021-04-19 PROCEDURE — 3008F PR BODY MASS INDEX (BMI) DOCUMENTED: ICD-10-PCS | Mod: CPTII,S$GLB,, | Performed by: FAMILY MEDICINE

## 2021-04-19 PROCEDURE — 3075F SYST BP GE 130 - 139MM HG: CPT | Mod: CPTII,S$GLB,, | Performed by: FAMILY MEDICINE

## 2021-04-19 PROCEDURE — 3078F DIAST BP <80 MM HG: CPT | Mod: CPTII,S$GLB,, | Performed by: FAMILY MEDICINE

## 2021-04-19 PROCEDURE — 80048 BASIC METABOLIC PNL TOTAL CA: CPT | Performed by: FAMILY MEDICINE

## 2021-04-19 PROCEDURE — 99214 PR OFFICE/OUTPT VISIT, EST, LEVL IV, 30-39 MIN: ICD-10-PCS | Mod: S$GLB,,, | Performed by: FAMILY MEDICINE

## 2021-04-19 PROCEDURE — 3008F BODY MASS INDEX DOCD: CPT | Mod: CPTII,S$GLB,, | Performed by: FAMILY MEDICINE

## 2021-04-20 ENCOUNTER — PATIENT MESSAGE (OUTPATIENT)
Dept: FAMILY MEDICINE | Facility: CLINIC | Age: 66
End: 2021-04-20

## 2021-04-20 RX ORDER — BISOPROLOL FUMARATE 10 MG/1
10 TABLET, FILM COATED ORAL DAILY
Qty: 90 TABLET | Refills: 0 | Status: CANCELLED | OUTPATIENT
Start: 2021-04-20

## 2021-04-20 RX ORDER — BISOPROLOL FUMARATE 10 MG/1
10 TABLET, FILM COATED ORAL DAILY
Qty: 90 TABLET | Refills: 0 | Status: SHIPPED | OUTPATIENT
Start: 2021-04-20 | End: 2021-07-06 | Stop reason: SDUPTHER

## 2021-04-21 ENCOUNTER — PATIENT MESSAGE (OUTPATIENT)
Dept: FAMILY MEDICINE | Facility: CLINIC | Age: 66
End: 2021-04-21

## 2021-04-22 ENCOUNTER — PATIENT MESSAGE (OUTPATIENT)
Dept: FAMILY MEDICINE | Facility: CLINIC | Age: 66
End: 2021-04-22

## 2021-04-23 ENCOUNTER — PATIENT MESSAGE (OUTPATIENT)
Dept: FAMILY MEDICINE | Facility: CLINIC | Age: 66
End: 2021-04-23

## 2021-07-07 ENCOUNTER — TELEPHONE (OUTPATIENT)
Dept: FAMILY MEDICINE | Facility: CLINIC | Age: 66
End: 2021-07-07

## 2021-07-09 RX ORDER — BISOPROLOL FUMARATE 10 MG/1
10 TABLET, FILM COATED ORAL DAILY
Qty: 90 TABLET | Refills: 0 | Status: SHIPPED | OUTPATIENT
Start: 2021-07-09 | End: 2021-10-04 | Stop reason: SDUPTHER

## 2021-07-09 RX ORDER — PRAVASTATIN SODIUM 20 MG/1
20 TABLET ORAL DAILY
Qty: 90 TABLET | Refills: 0 | Status: SHIPPED | OUTPATIENT
Start: 2021-07-09 | End: 2021-10-04 | Stop reason: SDUPTHER

## 2021-07-15 ENCOUNTER — PATIENT MESSAGE (OUTPATIENT)
Dept: INTERNAL MEDICINE | Facility: CLINIC | Age: 66
End: 2021-07-15

## 2021-07-15 ENCOUNTER — OFFICE VISIT (OUTPATIENT)
Dept: OTOLARYNGOLOGY | Facility: CLINIC | Age: 66
End: 2021-07-15
Payer: COMMERCIAL

## 2021-07-15 VITALS
SYSTOLIC BLOOD PRESSURE: 156 MMHG | DIASTOLIC BLOOD PRESSURE: 71 MMHG | TEMPERATURE: 99 F | BODY MASS INDEX: 20.74 KG/M2 | WEIGHT: 117.06 LBS | HEART RATE: 56 BPM

## 2021-07-15 DIAGNOSIS — J34.2 NASAL SEPTAL DEVIATION: ICD-10-CM

## 2021-07-15 DIAGNOSIS — Z74.09 IMPAIRED FUNCTIONAL MOBILITY, BALANCE, AND ENDURANCE: ICD-10-CM

## 2021-07-15 DIAGNOSIS — R13.10 DYSPHAGIA, UNSPECIFIED TYPE: Primary | ICD-10-CM

## 2021-07-15 DIAGNOSIS — Z86.79 HISTORY OF INTRACRANIAL HEMORRHAGE: ICD-10-CM

## 2021-07-15 DIAGNOSIS — R51.9 NONINTRACTABLE EPISODIC HEADACHE, UNSPECIFIED HEADACHE TYPE: ICD-10-CM

## 2021-07-15 DIAGNOSIS — L67.8 ABNORMAL FACIAL HAIR: ICD-10-CM

## 2021-07-15 DIAGNOSIS — K21.9 LARYNGOPHARYNGEAL REFLUX (LPR): ICD-10-CM

## 2021-07-15 DIAGNOSIS — R49.0 HOARSENESS: ICD-10-CM

## 2021-07-15 DIAGNOSIS — J30.9 ALLERGIC RHINITIS, UNSPECIFIED SEASONALITY, UNSPECIFIED TRIGGER: ICD-10-CM

## 2021-07-15 PROCEDURE — 1126F AMNT PAIN NOTED NONE PRSNT: CPT | Mod: S$GLB,,, | Performed by: SPECIALIST

## 2021-07-15 PROCEDURE — 3008F PR BODY MASS INDEX (BMI) DOCUMENTED: ICD-10-PCS | Mod: CPTII,S$GLB,, | Performed by: SPECIALIST

## 2021-07-15 PROCEDURE — 3288F FALL RISK ASSESSMENT DOCD: CPT | Mod: CPTII,S$GLB,, | Performed by: SPECIALIST

## 2021-07-15 PROCEDURE — 1101F PT FALLS ASSESS-DOCD LE1/YR: CPT | Mod: CPTII,S$GLB,, | Performed by: SPECIALIST

## 2021-07-15 PROCEDURE — 31575 DIAGNOSTIC LARYNGOSCOPY: CPT | Mod: S$GLB,,, | Performed by: SPECIALIST

## 2021-07-15 PROCEDURE — 1101F PR PT FALLS ASSESS DOC 0-1 FALLS W/OUT INJ PAST YR: ICD-10-PCS | Mod: CPTII,S$GLB,, | Performed by: SPECIALIST

## 2021-07-15 PROCEDURE — 1126F PR PAIN SEVERITY QUANTIFIED, NO PAIN PRESENT: ICD-10-PCS | Mod: S$GLB,,, | Performed by: SPECIALIST

## 2021-07-15 PROCEDURE — 99999 PR PBB SHADOW E&M-EST. PATIENT-LVL IV: ICD-10-PCS | Mod: PBBFAC,,, | Performed by: SPECIALIST

## 2021-07-15 PROCEDURE — 1159F MED LIST DOCD IN RCRD: CPT | Mod: S$GLB,,, | Performed by: SPECIALIST

## 2021-07-15 PROCEDURE — 3288F PR FALLS RISK ASSESSMENT DOCUMENTED: ICD-10-PCS | Mod: CPTII,S$GLB,, | Performed by: SPECIALIST

## 2021-07-15 PROCEDURE — 31575 LARYNGOSCOPY: ICD-10-PCS | Mod: S$GLB,,, | Performed by: SPECIALIST

## 2021-07-15 PROCEDURE — 99214 OFFICE O/P EST MOD 30 MIN: CPT | Mod: 25,S$GLB,, | Performed by: SPECIALIST

## 2021-07-15 PROCEDURE — 3008F BODY MASS INDEX DOCD: CPT | Mod: CPTII,S$GLB,, | Performed by: SPECIALIST

## 2021-07-15 PROCEDURE — 99214 PR OFFICE/OUTPT VISIT, EST, LEVL IV, 30-39 MIN: ICD-10-PCS | Mod: 25,S$GLB,, | Performed by: SPECIALIST

## 2021-07-15 PROCEDURE — 99999 PR PBB SHADOW E&M-EST. PATIENT-LVL IV: CPT | Mod: PBBFAC,,, | Performed by: SPECIALIST

## 2021-07-15 PROCEDURE — 1159F PR MEDICATION LIST DOCUMENTED IN MEDICAL RECORD: ICD-10-PCS | Mod: S$GLB,,, | Performed by: SPECIALIST

## 2021-08-10 ENCOUNTER — CLINICAL SUPPORT (OUTPATIENT)
Dept: REHABILITATION | Facility: HOSPITAL | Age: 66
End: 2021-08-10
Attending: SPECIALIST
Payer: COMMERCIAL

## 2021-08-10 DIAGNOSIS — Z74.09 IMPAIRED FUNCTIONAL MOBILITY, BALANCE, GAIT, AND ENDURANCE: ICD-10-CM

## 2021-08-10 DIAGNOSIS — Z74.09 IMPAIRED FUNCTIONAL MOBILITY, BALANCE, AND ENDURANCE: ICD-10-CM

## 2021-08-10 DIAGNOSIS — Z86.79 HISTORY OF INTRACRANIAL HEMORRHAGE: ICD-10-CM

## 2021-08-10 PROCEDURE — 97161 PT EVAL LOW COMPLEX 20 MIN: CPT | Mod: PO

## 2021-08-13 ENCOUNTER — CLINICAL SUPPORT (OUTPATIENT)
Dept: REHABILITATION | Facility: HOSPITAL | Age: 66
End: 2021-08-13
Attending: SPECIALIST
Payer: COMMERCIAL

## 2021-08-13 DIAGNOSIS — Z74.09 IMPAIRED FUNCTIONAL MOBILITY, BALANCE, GAIT, AND ENDURANCE: ICD-10-CM

## 2021-08-13 PROCEDURE — 97112 NEUROMUSCULAR REEDUCATION: CPT | Mod: PO

## 2021-08-13 PROCEDURE — 97110 THERAPEUTIC EXERCISES: CPT | Mod: PO

## 2021-08-23 ENCOUNTER — TELEPHONE (OUTPATIENT)
Dept: OTOLARYNGOLOGY | Facility: CLINIC | Age: 66
End: 2021-08-23

## 2021-08-23 ENCOUNTER — CLINICAL SUPPORT (OUTPATIENT)
Dept: REHABILITATION | Facility: HOSPITAL | Age: 66
End: 2021-08-23
Attending: SPECIALIST
Payer: COMMERCIAL

## 2021-08-23 DIAGNOSIS — Z74.09 IMPAIRED FUNCTIONAL MOBILITY, BALANCE, GAIT, AND ENDURANCE: ICD-10-CM

## 2021-08-23 PROCEDURE — 97112 NEUROMUSCULAR REEDUCATION: CPT | Mod: PO

## 2021-08-23 PROCEDURE — 97110 THERAPEUTIC EXERCISES: CPT | Mod: PO

## 2021-08-25 ENCOUNTER — CLINICAL SUPPORT (OUTPATIENT)
Dept: REHABILITATION | Facility: HOSPITAL | Age: 66
End: 2021-08-25
Attending: SPECIALIST
Payer: COMMERCIAL

## 2021-08-25 DIAGNOSIS — Z74.09 IMPAIRED FUNCTIONAL MOBILITY, BALANCE, GAIT, AND ENDURANCE: ICD-10-CM

## 2021-08-25 PROCEDURE — 97110 THERAPEUTIC EXERCISES: CPT | Mod: PO

## 2021-08-25 PROCEDURE — 97112 NEUROMUSCULAR REEDUCATION: CPT | Mod: PO

## 2021-09-13 ENCOUNTER — DOCUMENTATION ONLY (OUTPATIENT)
Dept: REHABILITATION | Facility: HOSPITAL | Age: 66
End: 2021-09-13

## 2021-09-15 ENCOUNTER — PATIENT MESSAGE (OUTPATIENT)
Dept: OTOLARYNGOLOGY | Facility: CLINIC | Age: 66
End: 2021-09-15

## 2021-09-20 ENCOUNTER — CLINICAL SUPPORT (OUTPATIENT)
Dept: REHABILITATION | Facility: HOSPITAL | Age: 66
End: 2021-09-20
Attending: SPECIALIST
Payer: COMMERCIAL

## 2021-09-20 DIAGNOSIS — Z74.09 IMPAIRED FUNCTIONAL MOBILITY, BALANCE, GAIT, AND ENDURANCE: ICD-10-CM

## 2021-09-20 PROCEDURE — 97112 NEUROMUSCULAR REEDUCATION: CPT | Mod: PO

## 2021-09-20 PROCEDURE — 97110 THERAPEUTIC EXERCISES: CPT | Mod: PO

## 2021-09-28 ENCOUNTER — CLINICAL SUPPORT (OUTPATIENT)
Dept: REHABILITATION | Facility: HOSPITAL | Age: 66
End: 2021-09-28
Attending: SPECIALIST
Payer: COMMERCIAL

## 2021-09-28 DIAGNOSIS — Z74.09 IMPAIRED FUNCTIONAL MOBILITY, BALANCE, GAIT, AND ENDURANCE: ICD-10-CM

## 2021-09-28 DIAGNOSIS — Z01.818 PRE-OP TESTING: ICD-10-CM

## 2021-09-28 PROCEDURE — 97110 THERAPEUTIC EXERCISES: CPT | Mod: PO,CQ

## 2021-09-28 PROCEDURE — 97112 NEUROMUSCULAR REEDUCATION: CPT | Mod: PO,CQ

## 2021-10-01 ENCOUNTER — CLINICAL SUPPORT (OUTPATIENT)
Dept: REHABILITATION | Facility: HOSPITAL | Age: 66
End: 2021-10-01
Attending: SPECIALIST
Payer: COMMERCIAL

## 2021-10-01 DIAGNOSIS — Z74.09 IMPAIRED FUNCTIONAL MOBILITY, BALANCE, GAIT, AND ENDURANCE: ICD-10-CM

## 2021-10-01 PROCEDURE — 97110 THERAPEUTIC EXERCISES: CPT | Mod: PO

## 2021-10-04 ENCOUNTER — OFFICE VISIT (OUTPATIENT)
Dept: FAMILY MEDICINE | Facility: CLINIC | Age: 66
End: 2021-10-04
Attending: FAMILY MEDICINE
Payer: COMMERCIAL

## 2021-10-04 VITALS
WEIGHT: 118 LBS | DIASTOLIC BLOOD PRESSURE: 78 MMHG | HEART RATE: 69 BPM | HEIGHT: 63 IN | BODY MASS INDEX: 20.91 KG/M2 | SYSTOLIC BLOOD PRESSURE: 116 MMHG | OXYGEN SATURATION: 98 %

## 2021-10-04 DIAGNOSIS — Z12.31 ENCOUNTER FOR SCREENING MAMMOGRAM FOR BREAST CANCER: ICD-10-CM

## 2021-10-04 DIAGNOSIS — E78.2 MIXED HYPERLIPIDEMIA: ICD-10-CM

## 2021-10-04 DIAGNOSIS — Z78.0 ASYMPTOMATIC MENOPAUSE: ICD-10-CM

## 2021-10-04 DIAGNOSIS — E03.9 ACQUIRED HYPOTHYROIDISM: ICD-10-CM

## 2021-10-04 DIAGNOSIS — Z00.00 ANNUAL PHYSICAL EXAM: Primary | ICD-10-CM

## 2021-10-04 PROCEDURE — 3288F PR FALLS RISK ASSESSMENT DOCUMENTED: ICD-10-PCS | Mod: CPTII,S$GLB,, | Performed by: FAMILY MEDICINE

## 2021-10-04 PROCEDURE — 1101F PR PT FALLS ASSESS DOC 0-1 FALLS W/OUT INJ PAST YR: ICD-10-PCS | Mod: CPTII,S$GLB,, | Performed by: FAMILY MEDICINE

## 2021-10-04 PROCEDURE — 3066F NEPHROPATHY DOC TX: CPT | Mod: CPTII,S$GLB,, | Performed by: FAMILY MEDICINE

## 2021-10-04 PROCEDURE — 99214 PR OFFICE/OUTPT VISIT, EST, LEVL IV, 30-39 MIN: ICD-10-PCS | Mod: S$GLB,,, | Performed by: FAMILY MEDICINE

## 2021-10-04 PROCEDURE — 1126F AMNT PAIN NOTED NONE PRSNT: CPT | Mod: CPTII,S$GLB,, | Performed by: FAMILY MEDICINE

## 2021-10-04 PROCEDURE — 3288F FALL RISK ASSESSMENT DOCD: CPT | Mod: CPTII,S$GLB,, | Performed by: FAMILY MEDICINE

## 2021-10-04 PROCEDURE — 3074F SYST BP LT 130 MM HG: CPT | Mod: CPTII,S$GLB,, | Performed by: FAMILY MEDICINE

## 2021-10-04 PROCEDURE — 3078F PR MOST RECENT DIASTOLIC BLOOD PRESSURE < 80 MM HG: ICD-10-PCS | Mod: CPTII,S$GLB,, | Performed by: FAMILY MEDICINE

## 2021-10-04 PROCEDURE — 3008F PR BODY MASS INDEX (BMI) DOCUMENTED: ICD-10-PCS | Mod: CPTII,S$GLB,, | Performed by: FAMILY MEDICINE

## 2021-10-04 PROCEDURE — 99999 PR PBB SHADOW E&M-EST. PATIENT-LVL IV: CPT | Mod: PBBFAC,,, | Performed by: FAMILY MEDICINE

## 2021-10-04 PROCEDURE — 3061F NEG MICROALBUMINURIA REV: CPT | Mod: CPTII,S$GLB,, | Performed by: FAMILY MEDICINE

## 2021-10-04 PROCEDURE — 3061F PR NEG MICROALBUMINURIA RESULT DOCUMENTED/REVIEW: ICD-10-PCS | Mod: CPTII,S$GLB,, | Performed by: FAMILY MEDICINE

## 2021-10-04 PROCEDURE — 3008F BODY MASS INDEX DOCD: CPT | Mod: CPTII,S$GLB,, | Performed by: FAMILY MEDICINE

## 2021-10-04 PROCEDURE — 3074F PR MOST RECENT SYSTOLIC BLOOD PRESSURE < 130 MM HG: ICD-10-PCS | Mod: CPTII,S$GLB,, | Performed by: FAMILY MEDICINE

## 2021-10-04 PROCEDURE — 1101F PT FALLS ASSESS-DOCD LE1/YR: CPT | Mod: CPTII,S$GLB,, | Performed by: FAMILY MEDICINE

## 2021-10-04 PROCEDURE — 3078F DIAST BP <80 MM HG: CPT | Mod: CPTII,S$GLB,, | Performed by: FAMILY MEDICINE

## 2021-10-04 PROCEDURE — 3066F PR DOCUMENTATION OF TREATMENT FOR NEPHROPATHY: ICD-10-PCS | Mod: CPTII,S$GLB,, | Performed by: FAMILY MEDICINE

## 2021-10-04 PROCEDURE — 99999 PR PBB SHADOW E&M-EST. PATIENT-LVL IV: ICD-10-PCS | Mod: PBBFAC,,, | Performed by: FAMILY MEDICINE

## 2021-10-04 PROCEDURE — 99214 OFFICE O/P EST MOD 30 MIN: CPT | Mod: S$GLB,,, | Performed by: FAMILY MEDICINE

## 2021-10-04 PROCEDURE — 1159F PR MEDICATION LIST DOCUMENTED IN MEDICAL RECORD: ICD-10-PCS | Mod: CPTII,S$GLB,, | Performed by: FAMILY MEDICINE

## 2021-10-04 PROCEDURE — 1159F MED LIST DOCD IN RCRD: CPT | Mod: CPTII,S$GLB,, | Performed by: FAMILY MEDICINE

## 2021-10-04 PROCEDURE — 1126F PR PAIN SEVERITY QUANTIFIED, NO PAIN PRESENT: ICD-10-PCS | Mod: CPTII,S$GLB,, | Performed by: FAMILY MEDICINE

## 2021-10-04 RX ORDER — LEVOTHYROXINE SODIUM 50 UG/1
TABLET ORAL
Qty: 90 TABLET | Refills: 3 | Status: SHIPPED | OUTPATIENT
Start: 2021-10-04 | End: 2022-11-09

## 2021-10-04 RX ORDER — PRAVASTATIN SODIUM 20 MG/1
20 TABLET ORAL DAILY
Qty: 90 TABLET | Refills: 3 | Status: SHIPPED | OUTPATIENT
Start: 2021-10-04 | End: 2022-10-06

## 2021-10-04 RX ORDER — BISOPROLOL FUMARATE 10 MG/1
10 TABLET, FILM COATED ORAL DAILY
Qty: 90 TABLET | Refills: 3 | Status: SHIPPED | OUTPATIENT
Start: 2021-10-04 | End: 2022-10-05

## 2021-10-05 ENCOUNTER — LAB VISIT (OUTPATIENT)
Dept: LAB | Facility: HOSPITAL | Age: 66
End: 2021-10-05
Attending: FAMILY MEDICINE
Payer: COMMERCIAL

## 2021-10-05 DIAGNOSIS — E78.2 MIXED HYPERLIPIDEMIA: ICD-10-CM

## 2021-10-05 DIAGNOSIS — Z00.00 ANNUAL PHYSICAL EXAM: ICD-10-CM

## 2021-10-05 DIAGNOSIS — E03.9 ACQUIRED HYPOTHYROIDISM: ICD-10-CM

## 2021-10-05 LAB
ALBUMIN SERPL BCP-MCNC: 4 G/DL (ref 3.5–5.2)
ALP SERPL-CCNC: 72 U/L (ref 55–135)
ALT SERPL W/O P-5'-P-CCNC: 12 U/L (ref 10–44)
ANION GAP SERPL CALC-SCNC: 15 MMOL/L (ref 8–16)
AST SERPL-CCNC: 19 U/L (ref 10–40)
BASOPHILS # BLD AUTO: 0.03 K/UL (ref 0–0.2)
BASOPHILS NFR BLD: 0.6 % (ref 0–1.9)
BILIRUB SERPL-MCNC: 0.3 MG/DL (ref 0.1–1)
BUN SERPL-MCNC: 16 MG/DL (ref 8–23)
CALCIUM SERPL-MCNC: 10.2 MG/DL (ref 8.7–10.5)
CHLORIDE SERPL-SCNC: 109 MMOL/L (ref 95–110)
CHOLEST SERPL-MCNC: 201 MG/DL (ref 120–199)
CHOLEST/HDLC SERPL: 3 {RATIO} (ref 2–5)
CO2 SERPL-SCNC: 21 MMOL/L (ref 23–29)
CREAT SERPL-MCNC: 1 MG/DL (ref 0.5–1.4)
DIFFERENTIAL METHOD: ABNORMAL
EOSINOPHIL # BLD AUTO: 0.2 K/UL (ref 0–0.5)
EOSINOPHIL NFR BLD: 3.4 % (ref 0–8)
ERYTHROCYTE [DISTWIDTH] IN BLOOD BY AUTOMATED COUNT: 12.8 % (ref 11.5–14.5)
EST. GFR  (AFRICAN AMERICAN): >60 ML/MIN/1.73 M^2
EST. GFR  (NON AFRICAN AMERICAN): 58 ML/MIN/1.73 M^2
GLUCOSE SERPL-MCNC: 80 MG/DL (ref 70–110)
HCT VFR BLD AUTO: 36.2 % (ref 37–48.5)
HDLC SERPL-MCNC: 67 MG/DL (ref 40–75)
HDLC SERPL: 33.3 % (ref 20–50)
HGB BLD-MCNC: 12 G/DL (ref 12–16)
IMM GRANULOCYTES # BLD AUTO: 0.01 K/UL (ref 0–0.04)
IMM GRANULOCYTES NFR BLD AUTO: 0.2 % (ref 0–0.5)
LDLC SERPL CALC-MCNC: 104.2 MG/DL (ref 63–159)
LYMPHOCYTES # BLD AUTO: 1.9 K/UL (ref 1–4.8)
LYMPHOCYTES NFR BLD: 38.7 % (ref 18–48)
MCH RBC QN AUTO: 33.1 PG (ref 27–31)
MCHC RBC AUTO-ENTMCNC: 33.1 G/DL (ref 32–36)
MCV RBC AUTO: 100 FL (ref 82–98)
MONOCYTES # BLD AUTO: 0.7 K/UL (ref 0.3–1)
MONOCYTES NFR BLD: 13.4 % (ref 4–15)
NEUTROPHILS # BLD AUTO: 2.2 K/UL (ref 1.8–7.7)
NEUTROPHILS NFR BLD: 43.7 % (ref 38–73)
NONHDLC SERPL-MCNC: 134 MG/DL
NRBC BLD-RTO: 0 /100 WBC
PLATELET # BLD AUTO: 182 K/UL (ref 150–450)
PMV BLD AUTO: 9.9 FL (ref 9.2–12.9)
POTASSIUM SERPL-SCNC: 4.7 MMOL/L (ref 3.5–5.1)
PROT SERPL-MCNC: 7.5 G/DL (ref 6–8.4)
RBC # BLD AUTO: 3.63 M/UL (ref 4–5.4)
SODIUM SERPL-SCNC: 145 MMOL/L (ref 136–145)
TRIGL SERPL-MCNC: 149 MG/DL (ref 30–150)
TSH SERPL DL<=0.005 MIU/L-ACNC: 2.08 UIU/ML (ref 0.4–4)
WBC # BLD AUTO: 4.93 K/UL (ref 3.9–12.7)

## 2021-10-05 PROCEDURE — 36415 COLL VENOUS BLD VENIPUNCTURE: CPT | Mod: PO | Performed by: FAMILY MEDICINE

## 2021-10-05 PROCEDURE — 80053 COMPREHEN METABOLIC PANEL: CPT | Performed by: FAMILY MEDICINE

## 2021-10-05 PROCEDURE — 84443 ASSAY THYROID STIM HORMONE: CPT | Performed by: FAMILY MEDICINE

## 2021-10-05 PROCEDURE — 85025 COMPLETE CBC W/AUTO DIFF WBC: CPT | Performed by: FAMILY MEDICINE

## 2021-10-05 PROCEDURE — 80061 LIPID PANEL: CPT | Performed by: FAMILY MEDICINE

## 2021-10-06 ENCOUNTER — CLINICAL SUPPORT (OUTPATIENT)
Dept: REHABILITATION | Facility: HOSPITAL | Age: 66
End: 2021-10-06
Payer: COMMERCIAL

## 2021-10-06 DIAGNOSIS — Z74.09 IMPAIRED FUNCTIONAL MOBILITY, BALANCE, GAIT, AND ENDURANCE: ICD-10-CM

## 2021-10-06 PROCEDURE — 97110 THERAPEUTIC EXERCISES: CPT | Mod: PO

## 2021-10-06 PROCEDURE — 97112 NEUROMUSCULAR REEDUCATION: CPT | Mod: PO

## 2021-10-08 ENCOUNTER — CLINICAL SUPPORT (OUTPATIENT)
Dept: REHABILITATION | Facility: HOSPITAL | Age: 66
End: 2021-10-08
Payer: COMMERCIAL

## 2021-10-08 DIAGNOSIS — Z74.09 IMPAIRED FUNCTIONAL MOBILITY, BALANCE, GAIT, AND ENDURANCE: ICD-10-CM

## 2021-10-08 PROCEDURE — 97112 NEUROMUSCULAR REEDUCATION: CPT | Mod: PO,CQ

## 2021-10-08 PROCEDURE — 97110 THERAPEUTIC EXERCISES: CPT | Mod: PO,CQ

## 2021-10-11 ENCOUNTER — TELEPHONE (OUTPATIENT)
Dept: OTOLARYNGOLOGY | Facility: CLINIC | Age: 66
End: 2021-10-11

## 2021-10-12 ENCOUNTER — DOCUMENTATION ONLY (OUTPATIENT)
Dept: REHABILITATION | Facility: HOSPITAL | Age: 66
End: 2021-10-12

## 2021-10-13 ENCOUNTER — CLINICAL SUPPORT (OUTPATIENT)
Dept: REHABILITATION | Facility: HOSPITAL | Age: 66
End: 2021-10-13
Payer: COMMERCIAL

## 2021-10-13 ENCOUNTER — HOSPITAL ENCOUNTER (OUTPATIENT)
Dept: RADIOLOGY | Facility: HOSPITAL | Age: 66
Discharge: HOME | End: 2021-10-13
Attending: FAMILY MEDICINE
Payer: COMMERCIAL

## 2021-10-13 DIAGNOSIS — E78.2 MIXED HYPERLIPIDEMIA: ICD-10-CM

## 2021-10-13 DIAGNOSIS — E03.9 ACQUIRED HYPOTHYROIDISM: ICD-10-CM

## 2021-10-13 DIAGNOSIS — Z12.31 ENCOUNTER FOR SCREENING MAMMOGRAM FOR BREAST CANCER: ICD-10-CM

## 2021-10-13 DIAGNOSIS — Z74.09 IMPAIRED FUNCTIONAL MOBILITY, BALANCE, GAIT, AND ENDURANCE: ICD-10-CM

## 2021-10-13 PROCEDURE — 71046 X-RAY EXAM CHEST 2 VIEWS: CPT | Mod: TC,PO

## 2021-10-13 PROCEDURE — 77067 MAMMO DIGITAL SCREENING BILAT WITH TOMO: ICD-10-PCS | Mod: 26,,, | Performed by: RADIOLOGY

## 2021-10-13 PROCEDURE — 77067 SCR MAMMO BI INCL CAD: CPT | Mod: 26,,, | Performed by: RADIOLOGY

## 2021-10-13 PROCEDURE — 71046 X-RAY EXAM CHEST 2 VIEWS: CPT | Mod: 26,,, | Performed by: RADIOLOGY

## 2021-10-13 PROCEDURE — 77063 BREAST TOMOSYNTHESIS BI: CPT | Mod: 26,,, | Performed by: RADIOLOGY

## 2021-10-13 PROCEDURE — 97112 NEUROMUSCULAR REEDUCATION: CPT | Mod: PO

## 2021-10-13 PROCEDURE — 97110 THERAPEUTIC EXERCISES: CPT | Mod: PO

## 2021-10-13 PROCEDURE — 77067 SCR MAMMO BI INCL CAD: CPT | Mod: TC,PO

## 2021-10-13 PROCEDURE — 71046 XR CHEST PA AND LATERAL: ICD-10-PCS | Mod: 26,,, | Performed by: RADIOLOGY

## 2021-10-13 PROCEDURE — 77063 MAMMO DIGITAL SCREENING BILAT WITH TOMO: ICD-10-PCS | Mod: 26,,, | Performed by: RADIOLOGY

## 2021-10-14 ENCOUNTER — OFFICE VISIT (OUTPATIENT)
Dept: OTOLARYNGOLOGY | Facility: CLINIC | Age: 66
End: 2021-10-14
Payer: COMMERCIAL

## 2021-10-14 VITALS
HEART RATE: 63 BPM | TEMPERATURE: 99 F | DIASTOLIC BLOOD PRESSURE: 66 MMHG | BODY MASS INDEX: 21.05 KG/M2 | WEIGHT: 118.81 LBS | SYSTOLIC BLOOD PRESSURE: 149 MMHG

## 2021-10-14 DIAGNOSIS — Z86.79 HISTORY OF INTRACRANIAL HEMORRHAGE: ICD-10-CM

## 2021-10-14 DIAGNOSIS — R51.9 NONINTRACTABLE EPISODIC HEADACHE, UNSPECIFIED HEADACHE TYPE: ICD-10-CM

## 2021-10-14 DIAGNOSIS — Z74.09 IMPAIRED FUNCTIONAL MOBILITY, BALANCE, AND ENDURANCE: ICD-10-CM

## 2021-10-14 DIAGNOSIS — J34.2 NASAL SEPTAL DEVIATION: ICD-10-CM

## 2021-10-14 DIAGNOSIS — R13.10 DYSPHAGIA, UNSPECIFIED TYPE: ICD-10-CM

## 2021-10-14 DIAGNOSIS — K21.9 LARYNGOPHARYNGEAL REFLUX (LPR): ICD-10-CM

## 2021-10-14 DIAGNOSIS — J30.9 ALLERGIC RHINITIS, UNSPECIFIED SEASONALITY, UNSPECIFIED TRIGGER: ICD-10-CM

## 2021-10-14 DIAGNOSIS — R09.89 THROAT CLEARING: ICD-10-CM

## 2021-10-14 DIAGNOSIS — R49.0 HOARSENESS: Primary | ICD-10-CM

## 2021-10-14 PROCEDURE — 31575 DIAGNOSTIC LARYNGOSCOPY: CPT | Mod: S$GLB,,, | Performed by: SPECIALIST

## 2021-10-14 PROCEDURE — 3066F NEPHROPATHY DOC TX: CPT | Mod: CPTII,S$GLB,, | Performed by: SPECIALIST

## 2021-10-14 PROCEDURE — 1159F MED LIST DOCD IN RCRD: CPT | Mod: CPTII,S$GLB,, | Performed by: SPECIALIST

## 2021-10-14 PROCEDURE — 1101F PR PT FALLS ASSESS DOC 0-1 FALLS W/OUT INJ PAST YR: ICD-10-PCS | Mod: CPTII,S$GLB,, | Performed by: SPECIALIST

## 2021-10-14 PROCEDURE — 3061F NEG MICROALBUMINURIA REV: CPT | Mod: CPTII,S$GLB,, | Performed by: SPECIALIST

## 2021-10-14 PROCEDURE — 3008F BODY MASS INDEX DOCD: CPT | Mod: CPTII,S$GLB,, | Performed by: SPECIALIST

## 2021-10-14 PROCEDURE — 3008F PR BODY MASS INDEX (BMI) DOCUMENTED: ICD-10-PCS | Mod: CPTII,S$GLB,, | Performed by: SPECIALIST

## 2021-10-14 PROCEDURE — 3061F PR NEG MICROALBUMINURIA RESULT DOCUMENTED/REVIEW: ICD-10-PCS | Mod: CPTII,S$GLB,, | Performed by: SPECIALIST

## 2021-10-14 PROCEDURE — 3066F PR DOCUMENTATION OF TREATMENT FOR NEPHROPATHY: ICD-10-PCS | Mod: CPTII,S$GLB,, | Performed by: SPECIALIST

## 2021-10-14 PROCEDURE — 3288F PR FALLS RISK ASSESSMENT DOCUMENTED: ICD-10-PCS | Mod: CPTII,S$GLB,, | Performed by: SPECIALIST

## 2021-10-14 PROCEDURE — 1159F PR MEDICATION LIST DOCUMENTED IN MEDICAL RECORD: ICD-10-PCS | Mod: CPTII,S$GLB,, | Performed by: SPECIALIST

## 2021-10-14 PROCEDURE — 3078F PR MOST RECENT DIASTOLIC BLOOD PRESSURE < 80 MM HG: ICD-10-PCS | Mod: CPTII,S$GLB,, | Performed by: SPECIALIST

## 2021-10-14 PROCEDURE — 3077F SYST BP >= 140 MM HG: CPT | Mod: CPTII,S$GLB,, | Performed by: SPECIALIST

## 2021-10-14 PROCEDURE — 1101F PT FALLS ASSESS-DOCD LE1/YR: CPT | Mod: CPTII,S$GLB,, | Performed by: SPECIALIST

## 2021-10-14 PROCEDURE — 1160F RVW MEDS BY RX/DR IN RCRD: CPT | Mod: CPTII,S$GLB,, | Performed by: SPECIALIST

## 2021-10-14 PROCEDURE — 99999 PR PBB SHADOW E&M-EST. PATIENT-LVL III: ICD-10-PCS | Mod: PBBFAC,,, | Performed by: SPECIALIST

## 2021-10-14 PROCEDURE — 1160F PR REVIEW ALL MEDS BY PRESCRIBER/CLIN PHARMACIST DOCUMENTED: ICD-10-PCS | Mod: CPTII,S$GLB,, | Performed by: SPECIALIST

## 2021-10-14 PROCEDURE — 3077F PR MOST RECENT SYSTOLIC BLOOD PRESSURE >= 140 MM HG: ICD-10-PCS | Mod: CPTII,S$GLB,, | Performed by: SPECIALIST

## 2021-10-14 PROCEDURE — 1126F AMNT PAIN NOTED NONE PRSNT: CPT | Mod: CPTII,S$GLB,, | Performed by: SPECIALIST

## 2021-10-14 PROCEDURE — 99214 PR OFFICE/OUTPT VISIT, EST, LEVL IV, 30-39 MIN: ICD-10-PCS | Mod: 25,S$GLB,, | Performed by: SPECIALIST

## 2021-10-14 PROCEDURE — 99214 OFFICE O/P EST MOD 30 MIN: CPT | Mod: 25,S$GLB,, | Performed by: SPECIALIST

## 2021-10-14 PROCEDURE — 31575 LARYNGOSCOPY: ICD-10-PCS | Mod: S$GLB,,, | Performed by: SPECIALIST

## 2021-10-14 PROCEDURE — 99999 PR PBB SHADOW E&M-EST. PATIENT-LVL III: CPT | Mod: PBBFAC,,, | Performed by: SPECIALIST

## 2021-10-14 PROCEDURE — 3288F FALL RISK ASSESSMENT DOCD: CPT | Mod: CPTII,S$GLB,, | Performed by: SPECIALIST

## 2021-10-14 PROCEDURE — 1126F PR PAIN SEVERITY QUANTIFIED, NO PAIN PRESENT: ICD-10-PCS | Mod: CPTII,S$GLB,, | Performed by: SPECIALIST

## 2021-10-14 PROCEDURE — 3078F DIAST BP <80 MM HG: CPT | Mod: CPTII,S$GLB,, | Performed by: SPECIALIST

## 2021-10-15 ENCOUNTER — CLINICAL SUPPORT (OUTPATIENT)
Dept: REHABILITATION | Facility: HOSPITAL | Age: 66
End: 2021-10-15
Payer: COMMERCIAL

## 2021-10-15 DIAGNOSIS — Z74.09 IMPAIRED FUNCTIONAL MOBILITY, BALANCE, GAIT, AND ENDURANCE: ICD-10-CM

## 2021-10-15 PROCEDURE — 97110 THERAPEUTIC EXERCISES: CPT | Mod: PO,CQ

## 2021-10-15 PROCEDURE — 97112 NEUROMUSCULAR REEDUCATION: CPT | Mod: PO,CQ

## 2021-10-20 ENCOUNTER — CLINICAL SUPPORT (OUTPATIENT)
Dept: REHABILITATION | Facility: HOSPITAL | Age: 66
End: 2021-10-20
Payer: COMMERCIAL

## 2021-10-20 DIAGNOSIS — Z74.09 IMPAIRED FUNCTIONAL MOBILITY, BALANCE, GAIT, AND ENDURANCE: ICD-10-CM

## 2021-10-20 PROCEDURE — 97110 THERAPEUTIC EXERCISES: CPT | Mod: PO

## 2021-10-20 PROCEDURE — 97112 NEUROMUSCULAR REEDUCATION: CPT | Mod: PO

## 2021-10-25 ENCOUNTER — CLINICAL SUPPORT (OUTPATIENT)
Dept: REHABILITATION | Facility: HOSPITAL | Age: 66
End: 2021-10-25
Payer: COMMERCIAL

## 2021-10-25 DIAGNOSIS — Z74.09 IMPAIRED FUNCTIONAL MOBILITY, BALANCE, GAIT, AND ENDURANCE: ICD-10-CM

## 2021-10-25 PROCEDURE — 97112 NEUROMUSCULAR REEDUCATION: CPT | Mod: PO

## 2021-10-25 PROCEDURE — 97110 THERAPEUTIC EXERCISES: CPT | Mod: PO

## 2021-11-03 ENCOUNTER — CLINICAL SUPPORT (OUTPATIENT)
Dept: REHABILITATION | Facility: HOSPITAL | Age: 66
End: 2021-11-03
Payer: COMMERCIAL

## 2021-11-03 DIAGNOSIS — Z74.09 IMPAIRED FUNCTIONAL MOBILITY, BALANCE, GAIT, AND ENDURANCE: ICD-10-CM

## 2021-11-03 PROCEDURE — 97112 NEUROMUSCULAR REEDUCATION: CPT | Mod: PO

## 2021-11-03 PROCEDURE — 97110 THERAPEUTIC EXERCISES: CPT | Mod: PO

## 2021-11-05 ENCOUNTER — CLINICAL SUPPORT (OUTPATIENT)
Dept: REHABILITATION | Facility: HOSPITAL | Age: 66
End: 2021-11-05
Payer: COMMERCIAL

## 2021-11-05 DIAGNOSIS — Z74.09 IMPAIRED FUNCTIONAL MOBILITY, BALANCE, GAIT, AND ENDURANCE: ICD-10-CM

## 2021-11-05 PROCEDURE — 97112 NEUROMUSCULAR REEDUCATION: CPT | Mod: PO

## 2021-11-05 PROCEDURE — 97110 THERAPEUTIC EXERCISES: CPT | Mod: PO

## 2021-11-08 ENCOUNTER — CLINICAL SUPPORT (OUTPATIENT)
Dept: REHABILITATION | Facility: HOSPITAL | Age: 66
End: 2021-11-08
Payer: COMMERCIAL

## 2021-11-08 DIAGNOSIS — Z74.09 IMPAIRED FUNCTIONAL MOBILITY, BALANCE, GAIT, AND ENDURANCE: ICD-10-CM

## 2021-11-08 PROCEDURE — 97110 THERAPEUTIC EXERCISES: CPT | Mod: PO,CQ

## 2021-11-08 PROCEDURE — 97112 NEUROMUSCULAR REEDUCATION: CPT | Mod: PO,CQ

## 2021-11-10 ENCOUNTER — CLINICAL SUPPORT (OUTPATIENT)
Dept: REHABILITATION | Facility: HOSPITAL | Age: 66
End: 2021-11-10
Payer: COMMERCIAL

## 2021-11-10 DIAGNOSIS — Z74.09 IMPAIRED FUNCTIONAL MOBILITY, BALANCE, GAIT, AND ENDURANCE: ICD-10-CM

## 2021-11-10 PROCEDURE — 97110 THERAPEUTIC EXERCISES: CPT | Mod: PO

## 2021-11-10 PROCEDURE — 97112 NEUROMUSCULAR REEDUCATION: CPT | Mod: PO

## 2021-11-12 ENCOUNTER — APPOINTMENT (OUTPATIENT)
Dept: RADIOLOGY | Facility: CLINIC | Age: 66
End: 2021-11-12
Attending: FAMILY MEDICINE
Payer: COMMERCIAL

## 2021-11-12 DIAGNOSIS — Z78.0 ASYMPTOMATIC MENOPAUSE: ICD-10-CM

## 2021-11-12 PROCEDURE — 77080 DEXA BONE DENSITY SPINE HIP: ICD-10-PCS | Mod: 26,,, | Performed by: INTERNAL MEDICINE

## 2021-11-12 PROCEDURE — 77080 DXA BONE DENSITY AXIAL: CPT | Mod: TC,PO

## 2021-11-12 PROCEDURE — 77080 DXA BONE DENSITY AXIAL: CPT | Mod: 26,,, | Performed by: INTERNAL MEDICINE

## 2021-11-15 ENCOUNTER — CLINICAL SUPPORT (OUTPATIENT)
Dept: REHABILITATION | Facility: HOSPITAL | Age: 66
End: 2021-11-15
Payer: COMMERCIAL

## 2021-11-15 DIAGNOSIS — Z74.09 IMPAIRED FUNCTIONAL MOBILITY, BALANCE, GAIT, AND ENDURANCE: ICD-10-CM

## 2021-11-15 PROCEDURE — 97110 THERAPEUTIC EXERCISES: CPT | Mod: PO,CQ

## 2021-11-15 PROCEDURE — 97112 NEUROMUSCULAR REEDUCATION: CPT | Mod: PO,CQ

## 2021-11-18 ENCOUNTER — CLINICAL SUPPORT (OUTPATIENT)
Dept: REHABILITATION | Facility: HOSPITAL | Age: 66
End: 2021-11-18
Payer: COMMERCIAL

## 2021-11-18 DIAGNOSIS — Z74.09 IMPAIRED FUNCTIONAL MOBILITY, BALANCE, GAIT, AND ENDURANCE: ICD-10-CM

## 2021-11-18 PROCEDURE — 97112 NEUROMUSCULAR REEDUCATION: CPT | Mod: PO,CQ

## 2021-11-18 PROCEDURE — 97110 THERAPEUTIC EXERCISES: CPT | Mod: PO,CQ

## 2021-11-23 ENCOUNTER — CLINICAL SUPPORT (OUTPATIENT)
Dept: REHABILITATION | Facility: HOSPITAL | Age: 66
End: 2021-11-23
Payer: COMMERCIAL

## 2021-11-23 DIAGNOSIS — Z74.09 IMPAIRED FUNCTIONAL MOBILITY, BALANCE, GAIT, AND ENDURANCE: ICD-10-CM

## 2021-11-23 PROCEDURE — 97112 NEUROMUSCULAR REEDUCATION: CPT | Mod: PO

## 2021-11-23 PROCEDURE — 97110 THERAPEUTIC EXERCISES: CPT | Mod: PO

## 2021-11-26 ENCOUNTER — CLINICAL SUPPORT (OUTPATIENT)
Dept: REHABILITATION | Facility: HOSPITAL | Age: 66
End: 2021-11-26
Payer: COMMERCIAL

## 2021-11-26 DIAGNOSIS — Z74.09 IMPAIRED FUNCTIONAL MOBILITY, BALANCE, GAIT, AND ENDURANCE: ICD-10-CM

## 2021-11-26 PROCEDURE — 97112 NEUROMUSCULAR REEDUCATION: CPT | Mod: PO,CQ

## 2021-11-26 PROCEDURE — 97110 THERAPEUTIC EXERCISES: CPT | Mod: PO,CQ

## 2021-11-29 ENCOUNTER — CLINICAL SUPPORT (OUTPATIENT)
Dept: REHABILITATION | Facility: HOSPITAL | Age: 66
End: 2021-11-29
Payer: COMMERCIAL

## 2021-11-29 DIAGNOSIS — Z74.09 IMPAIRED FUNCTIONAL MOBILITY, BALANCE, GAIT, AND ENDURANCE: ICD-10-CM

## 2021-11-29 PROCEDURE — 97110 THERAPEUTIC EXERCISES: CPT | Mod: PO

## 2021-12-02 ENCOUNTER — OFFICE VISIT (OUTPATIENT)
Dept: OTOLARYNGOLOGY | Facility: CLINIC | Age: 66
End: 2021-12-02
Payer: COMMERCIAL

## 2021-12-02 VITALS
HEART RATE: 60 BPM | SYSTOLIC BLOOD PRESSURE: 181 MMHG | TEMPERATURE: 98 F | WEIGHT: 119.06 LBS | DIASTOLIC BLOOD PRESSURE: 77 MMHG | BODY MASS INDEX: 21.09 KG/M2

## 2021-12-02 DIAGNOSIS — Z74.09 IMPAIRED FUNCTIONAL MOBILITY, BALANCE, AND ENDURANCE: ICD-10-CM

## 2021-12-02 DIAGNOSIS — K21.9 LARYNGOPHARYNGEAL REFLUX (LPR): ICD-10-CM

## 2021-12-02 DIAGNOSIS — J34.2 NASAL SEPTAL DEVIATION: ICD-10-CM

## 2021-12-02 DIAGNOSIS — R09.89 THROAT CLEARING: ICD-10-CM

## 2021-12-02 DIAGNOSIS — R13.10 DYSPHAGIA, UNSPECIFIED TYPE: Primary | ICD-10-CM

## 2021-12-02 DIAGNOSIS — J30.9 ALLERGIC RHINITIS, UNSPECIFIED SEASONALITY, UNSPECIFIED TRIGGER: ICD-10-CM

## 2021-12-02 DIAGNOSIS — Z86.79 HISTORY OF INTRACRANIAL HEMORRHAGE: ICD-10-CM

## 2021-12-02 PROCEDURE — 3066F NEPHROPATHY DOC TX: CPT | Mod: CPTII,S$GLB,, | Performed by: SPECIALIST

## 2021-12-02 PROCEDURE — 99213 OFFICE O/P EST LOW 20 MIN: CPT | Mod: S$GLB,,, | Performed by: SPECIALIST

## 2021-12-02 PROCEDURE — 3061F NEG MICROALBUMINURIA REV: CPT | Mod: CPTII,S$GLB,, | Performed by: SPECIALIST

## 2021-12-02 PROCEDURE — 99999 PR PBB SHADOW E&M-EST. PATIENT-LVL IV: ICD-10-PCS | Mod: PBBFAC,,, | Performed by: SPECIALIST

## 2021-12-02 PROCEDURE — 99213 PR OFFICE/OUTPT VISIT, EST, LEVL III, 20-29 MIN: ICD-10-PCS | Mod: S$GLB,,, | Performed by: SPECIALIST

## 2021-12-02 PROCEDURE — 99999 PR PBB SHADOW E&M-EST. PATIENT-LVL IV: CPT | Mod: PBBFAC,,, | Performed by: SPECIALIST

## 2021-12-02 PROCEDURE — 3061F PR NEG MICROALBUMINURIA RESULT DOCUMENTED/REVIEW: ICD-10-PCS | Mod: CPTII,S$GLB,, | Performed by: SPECIALIST

## 2021-12-02 PROCEDURE — 3066F PR DOCUMENTATION OF TREATMENT FOR NEPHROPATHY: ICD-10-PCS | Mod: CPTII,S$GLB,, | Performed by: SPECIALIST

## 2021-12-17 RX ORDER — FAMOTIDINE 20 MG/1
20 TABLET, FILM COATED ORAL 2 TIMES DAILY
Qty: 60 TABLET | Refills: 11 | Status: SHIPPED | OUTPATIENT
Start: 2021-12-17 | End: 2022-12-08

## 2021-12-22 ENCOUNTER — TELEPHONE (OUTPATIENT)
Dept: OTOLARYNGOLOGY | Facility: CLINIC | Age: 66
End: 2021-12-22
Payer: COMMERCIAL

## 2022-01-13 ENCOUNTER — OFFICE VISIT (OUTPATIENT)
Dept: OTOLARYNGOLOGY | Facility: CLINIC | Age: 67
End: 2022-01-13
Payer: COMMERCIAL

## 2022-01-13 VITALS
WEIGHT: 119.63 LBS | BODY MASS INDEX: 21.19 KG/M2 | HEART RATE: 66 BPM | SYSTOLIC BLOOD PRESSURE: 153 MMHG | DIASTOLIC BLOOD PRESSURE: 69 MMHG | TEMPERATURE: 98 F

## 2022-01-13 DIAGNOSIS — Z87.820 HISTORY OF TRAUMATIC BRAIN INJURY: ICD-10-CM

## 2022-01-13 DIAGNOSIS — Z86.79 HISTORY OF INTRACRANIAL HEMORRHAGE: ICD-10-CM

## 2022-01-13 DIAGNOSIS — R13.10 DYSPHAGIA, UNSPECIFIED TYPE: ICD-10-CM

## 2022-01-13 DIAGNOSIS — Z01.818 PRE-OP TESTING: ICD-10-CM

## 2022-01-13 DIAGNOSIS — K21.9 LARYNGOPHARYNGEAL REFLUX (LPR): ICD-10-CM

## 2022-01-13 DIAGNOSIS — H61.21 IMPACTED CERUMEN OF RIGHT EAR: ICD-10-CM

## 2022-01-13 DIAGNOSIS — J34.2 NASAL SEPTAL DEVIATION: ICD-10-CM

## 2022-01-13 DIAGNOSIS — Z74.09 IMPAIRED FUNCTIONAL MOBILITY, BALANCE, AND ENDURANCE: Primary | ICD-10-CM

## 2022-01-13 DIAGNOSIS — J30.9 ALLERGIC RHINITIS, UNSPECIFIED SEASONALITY, UNSPECIFIED TRIGGER: ICD-10-CM

## 2022-01-13 PROCEDURE — 3078F PR MOST RECENT DIASTOLIC BLOOD PRESSURE < 80 MM HG: ICD-10-PCS | Mod: CPTII,S$GLB,, | Performed by: SPECIALIST

## 2022-01-13 PROCEDURE — 3078F DIAST BP <80 MM HG: CPT | Mod: CPTII,S$GLB,, | Performed by: SPECIALIST

## 2022-01-13 PROCEDURE — 1159F PR MEDICATION LIST DOCUMENTED IN MEDICAL RECORD: ICD-10-PCS | Mod: CPTII,S$GLB,, | Performed by: SPECIALIST

## 2022-01-13 PROCEDURE — 99214 OFFICE O/P EST MOD 30 MIN: CPT | Mod: 25,S$GLB,, | Performed by: SPECIALIST

## 2022-01-13 PROCEDURE — 99999 PR PBB SHADOW E&M-EST. PATIENT-LVL IV: CPT | Mod: PBBFAC,,, | Performed by: SPECIALIST

## 2022-01-13 PROCEDURE — 1126F AMNT PAIN NOTED NONE PRSNT: CPT | Mod: CPTII,S$GLB,, | Performed by: SPECIALIST

## 2022-01-13 PROCEDURE — 69210 REMOVE IMPACTED EAR WAX UNI: CPT | Mod: S$GLB,,, | Performed by: SPECIALIST

## 2022-01-13 PROCEDURE — 3008F PR BODY MASS INDEX (BMI) DOCUMENTED: ICD-10-PCS | Mod: CPTII,S$GLB,, | Performed by: SPECIALIST

## 2022-01-13 PROCEDURE — 1160F PR REVIEW ALL MEDS BY PRESCRIBER/CLIN PHARMACIST DOCUMENTED: ICD-10-PCS | Mod: CPTII,S$GLB,, | Performed by: SPECIALIST

## 2022-01-13 PROCEDURE — 3288F PR FALLS RISK ASSESSMENT DOCUMENTED: ICD-10-PCS | Mod: CPTII,S$GLB,, | Performed by: SPECIALIST

## 2022-01-13 PROCEDURE — 1160F RVW MEDS BY RX/DR IN RCRD: CPT | Mod: CPTII,S$GLB,, | Performed by: SPECIALIST

## 2022-01-13 PROCEDURE — 3008F BODY MASS INDEX DOCD: CPT | Mod: CPTII,S$GLB,, | Performed by: SPECIALIST

## 2022-01-13 PROCEDURE — 3077F PR MOST RECENT SYSTOLIC BLOOD PRESSURE >= 140 MM HG: ICD-10-PCS | Mod: CPTII,S$GLB,, | Performed by: SPECIALIST

## 2022-01-13 PROCEDURE — 3077F SYST BP >= 140 MM HG: CPT | Mod: CPTII,S$GLB,, | Performed by: SPECIALIST

## 2022-01-13 PROCEDURE — 1159F MED LIST DOCD IN RCRD: CPT | Mod: CPTII,S$GLB,, | Performed by: SPECIALIST

## 2022-01-13 PROCEDURE — 69210 PR REMOVAL IMPACTED CERUMEN REQUIRING INSTRUMENTATION, UNILATERAL: ICD-10-PCS | Mod: S$GLB,,, | Performed by: SPECIALIST

## 2022-01-13 PROCEDURE — 1126F PR PAIN SEVERITY QUANTIFIED, NO PAIN PRESENT: ICD-10-PCS | Mod: CPTII,S$GLB,, | Performed by: SPECIALIST

## 2022-01-13 PROCEDURE — 99214 PR OFFICE/OUTPT VISIT, EST, LEVL IV, 30-39 MIN: ICD-10-PCS | Mod: 25,S$GLB,, | Performed by: SPECIALIST

## 2022-01-13 PROCEDURE — 3288F FALL RISK ASSESSMENT DOCD: CPT | Mod: CPTII,S$GLB,, | Performed by: SPECIALIST

## 2022-01-13 PROCEDURE — 1101F PR PT FALLS ASSESS DOC 0-1 FALLS W/OUT INJ PAST YR: ICD-10-PCS | Mod: CPTII,S$GLB,, | Performed by: SPECIALIST

## 2022-01-13 PROCEDURE — 99999 PR PBB SHADOW E&M-EST. PATIENT-LVL IV: ICD-10-PCS | Mod: PBBFAC,,, | Performed by: SPECIALIST

## 2022-01-13 PROCEDURE — 1101F PT FALLS ASSESS-DOCD LE1/YR: CPT | Mod: CPTII,S$GLB,, | Performed by: SPECIALIST

## 2022-01-13 NOTE — PROCEDURES
"Ear Cerumen Removal    Date/Time: 1/13/2022 10:00 AM  Performed by: ДМИТРИЙ Macdonald MD  Authorized by: ДМИТРИЙ Macdonald MD     Time out: Immediately prior to procedure a "time out" was called to verify the correct patient, procedure, equipment, support staff and site/side marked as required.    Consent Done?:  Yes (Verbal)    Local anesthetic:  None  Location details:  Right ear  Procedure type comment:  Wire loop and 8 Fr suction  Cerumen  Removal Results:  Cerumen completely removed  Patient tolerance:  Patient tolerated the procedure well with no immediate complications      "
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
## 2022-01-19 ENCOUNTER — TELEPHONE (OUTPATIENT)
Dept: NEUROLOGY | Facility: CLINIC | Age: 67
End: 2022-01-19
Payer: COMMERCIAL

## 2022-01-19 NOTE — TELEPHONE ENCOUNTER
----- Message from Kane Tompkins sent at 1/19/2022  4:39 PM CST -----  Regarding: Call back request  Contact: pt  Pt requesting call back to be scheduled for Z86.79 (ICD-10-CM) - History of intracranial hemorrhage  Z74.09 (ICD-10-CM) - Impaired functional mobility, balance, and endurance  Z87.820 (ICD-10-CM) - History of traumatic brain injury    Pt @ [phone]

## 2022-01-20 ENCOUNTER — TELEPHONE (OUTPATIENT)
Dept: OTOLARYNGOLOGY | Facility: CLINIC | Age: 67
End: 2022-01-20
Payer: COMMERCIAL

## 2022-01-20 NOTE — TELEPHONE ENCOUNTER
"----- Message from Jose Fitch MA sent at 1/19/2022  5:13 PM CST -----  Regarding: FW: Call back request  Contact: pt    ----- Message -----  From: Mary Torrez RN  Sent: 1/19/2022   5:13 PM CST  To: Jose Fitch MA  Subject: FW: Call back request                            Could you please ask Dr. Macdonald about this.  Thanks.   ----- Message -----  From: Kane Tompkins  Sent: 1/19/2022   4:45 PM CST  To: [facility] Staff  Subject: Call back request                                Pt requesting call back in regards to referral for therapy.    Pt @ [phone]    [facility]  Pt Nate Salcedo  [address] [place] [address]  [facility] dawson vaughn,[id number]  [phone]-  Fax @ [phone]    Has been in therapy before. Orders needed for additional physical therapy for balance work  "Cissy"        Left message for pt to call Dr. Macdonald office at [phone].  "

## 2022-01-21 ENCOUNTER — TELEPHONE (OUTPATIENT)
Dept: OTOLARYNGOLOGY | Facility: CLINIC | Age: 67
End: 2022-01-21
Payer: COMMERCIAL

## 2022-01-21 NOTE — TELEPHONE ENCOUNTER
----- Message from Toney Andersen sent at 1/21/2022  3:22 PM CST -----  Regarding: Pt missed your call      Please contact the Pt as she missed your call     # [phone]    Attempted to call patient about her message today. Left message for pt.

## 2022-01-24 ENCOUNTER — PATIENT MESSAGE (OUTPATIENT)
Dept: OTOLARYNGOLOGY | Facility: CLINIC | Age: 67
End: 2022-01-24
Payer: COMMERCIAL

## 2022-01-24 ENCOUNTER — TELEPHONE (OUTPATIENT)
Dept: OTOLARYNGOLOGY | Facility: CLINIC | Age: 67
End: 2022-01-24
Payer: COMMERCIAL

## 2022-01-24 NOTE — TELEPHONE ENCOUNTER
----- Message from Toney Andersen sent at 1/24/2022  4:03 PM CST -----  Regarding: Pt missed your calls and would like a call bk    Pt would like a call back from your missed calls please.  If Jose could call her back.     # [phone]    Called and spoke with patient about anatomical work her physical therapy is Dr. Neri,  that's who she needs the referral for. Also, the neurologist is Dr. Zaragoza,  patient states she spoke with her  an he already gave the doctor her progress notes. However, she deceided to stay with Dr. Zaragoza and need a referral from Dr. Macdonald.

## 2022-01-24 NOTE — TELEPHONE ENCOUNTER
----- Message from Christina Valdes Patient Care Assistant sent at 1/24/2022  1:17 PM CST -----  Regarding: call back  Contact: Pt  Pt is requesting a call back from previous call.      Pt @ [phone]    Called and spoke to patient today about getting the name of the PT who she had seen before. Patient states she driving and will call Dr. Macdonald back when she return home today.

## 2022-02-11 ENCOUNTER — PATIENT MESSAGE (OUTPATIENT)
Dept: OTOLARYNGOLOGY | Facility: CLINIC | Age: 67
End: 2022-02-11
Payer: COMMERCIAL

## 2022-03-04 ENCOUNTER — TELEPHONE (OUTPATIENT)
Dept: NEUROLOGY | Facility: CLINIC | Age: 67
End: 2022-03-04
Payer: COMMERCIAL

## 2022-03-07 ENCOUNTER — TELEPHONE (OUTPATIENT)
Dept: NEUROLOGY | Facility: CLINIC | Age: 67
End: 2022-03-07
Payer: COMMERCIAL

## 2022-03-17 ENCOUNTER — OFFICE VISIT (OUTPATIENT)
Dept: OTOLARYNGOLOGY | Facility: CLINIC | Age: 67
End: 2022-03-17
Payer: COMMERCIAL

## 2022-03-17 DIAGNOSIS — R09.89 CHRONIC THROAT CLEARING: ICD-10-CM

## 2022-03-17 DIAGNOSIS — Z74.09 IMPAIRED FUNCTIONAL MOBILITY, BALANCE, AND ENDURANCE: ICD-10-CM

## 2022-03-17 DIAGNOSIS — J30.9 ALLERGIC RHINITIS, UNSPECIFIED SEASONALITY, UNSPECIFIED TRIGGER: ICD-10-CM

## 2022-03-17 DIAGNOSIS — R13.10 DYSPHAGIA, UNSPECIFIED TYPE: ICD-10-CM

## 2022-03-17 DIAGNOSIS — J34.2 NASAL SEPTAL DEVIATION: ICD-10-CM

## 2022-03-17 DIAGNOSIS — K21.9 LARYNGOPHARYNGEAL REFLUX (LPR): Primary | ICD-10-CM

## 2022-03-17 PROCEDURE — 3288F FALL RISK ASSESSMENT DOCD: CPT | Mod: CPTII,S$GLB,, | Performed by: SPECIALIST

## 2022-03-17 PROCEDURE — 99999 PR PBB SHADOW E&M-EST. PATIENT-LVL III: CPT | Mod: PBBFAC,,, | Performed by: SPECIALIST

## 2022-03-17 PROCEDURE — 1160F PR REVIEW ALL MEDS BY PRESCRIBER/CLIN PHARMACIST DOCUMENTED: ICD-10-PCS | Mod: CPTII,S$GLB,, | Performed by: SPECIALIST

## 2022-03-17 PROCEDURE — 3288F PR FALLS RISK ASSESSMENT DOCUMENTED: ICD-10-PCS | Mod: CPTII,S$GLB,, | Performed by: SPECIALIST

## 2022-03-17 PROCEDURE — 1101F PR PT FALLS ASSESS DOC 0-1 FALLS W/OUT INJ PAST YR: ICD-10-PCS | Mod: CPTII,S$GLB,, | Performed by: SPECIALIST

## 2022-03-17 PROCEDURE — 31575 LARYNGOSCOPY: ICD-10-PCS | Mod: S$GLB,,, | Performed by: SPECIALIST

## 2022-03-17 PROCEDURE — 1101F PT FALLS ASSESS-DOCD LE1/YR: CPT | Mod: CPTII,S$GLB,, | Performed by: SPECIALIST

## 2022-03-17 PROCEDURE — 99214 OFFICE O/P EST MOD 30 MIN: CPT | Mod: 25,S$GLB,, | Performed by: SPECIALIST

## 2022-03-17 PROCEDURE — 1160F RVW MEDS BY RX/DR IN RCRD: CPT | Mod: CPTII,S$GLB,, | Performed by: SPECIALIST

## 2022-03-17 PROCEDURE — 1159F MED LIST DOCD IN RCRD: CPT | Mod: CPTII,S$GLB,, | Performed by: SPECIALIST

## 2022-03-17 PROCEDURE — 31575 DIAGNOSTIC LARYNGOSCOPY: CPT | Mod: S$GLB,,, | Performed by: SPECIALIST

## 2022-03-17 PROCEDURE — 1159F PR MEDICATION LIST DOCUMENTED IN MEDICAL RECORD: ICD-10-PCS | Mod: CPTII,S$GLB,, | Performed by: SPECIALIST

## 2022-03-17 PROCEDURE — 99999 PR PBB SHADOW E&M-EST. PATIENT-LVL III: ICD-10-PCS | Mod: PBBFAC,,, | Performed by: SPECIALIST

## 2022-03-17 PROCEDURE — 99214 PR OFFICE/OUTPT VISIT, EST, LEVL IV, 30-39 MIN: ICD-10-PCS | Mod: 25,S$GLB,, | Performed by: SPECIALIST

## 2022-03-17 NOTE — PROCEDURES
"Laryngoscopy    Date/Time: 3/17/2022 10:00 AM  Performed by: ДМИТРИЙ Macdonald MD  Authorized by: ДМИТРИЙ Macdonald MD     Time out: Immediately prior to procedure a "time out" was called to verify the correct patient, procedure, equipment, support staff and site/side marked as required.    Consent Done?:  Yes (Verbal)  Anesthesia:     Local anesthetic:  4% Xylocaine spray with Juan-Synephrine (Topical aerosol)    Patient tolerance:  Patient tolerated the procedure well with no immediate complications    Decongestion performed?: Yes    Laryngoscopy:     Areas examined:  Vocal cords, larynx, hypopharynx, oropharynx, nasopharynx and nasal cavities    Laryngoscope size:  4 mm (Flexible video nasolaryngoscopy)  Nose External:      No external nasal deformity  Nose Intranasal:      Mucosa no polyps     Mucosa ulcers not present     No mucosa lesions present (Clear mucus in the nasal passages bilaterally)     Septum gross deformity ( septum deviated to the left)     Enlarged turbinates ( inferior turbinates enlarged bilaterally)  Nasopharynx:      No mucosa lesions     Adenoids not present     Posterior choanae patent     Eustachian tube patent  Larynx/hypopharynx:      No epiglottis lesions     No epiglottis edema     No AE folds lesions     No vocal cord polyps     Equal and normal bilateral ( vocal cords move symmetrically, no mucosal lesions noted)     No hypopharynx lesions     No piriform sinus pooling     No piriform sinus lesions     Post cricoid edema ( mild, slightly worse than at last visit)     Post cricoid erythema ( mild, slightly worse than at last visit)     Flexible video nasolaryngoscopy-nasal septal deviation and nasal changes allergic rhinitis; laryngeal changes consistent with laryngopharyngeal reflux that is stable with chronic H2O agonist therapy      "
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
## 2022-04-25 ENCOUNTER — PATIENT OUTREACH (OUTPATIENT)
Dept: INTERNAL MEDICINE | Facility: CLINIC | Age: 67
End: 2022-04-25
Payer: COMMERCIAL

## 2022-05-02 NOTE — TELEPHONE ENCOUNTER
----- Message from Celi Quintanilla sent at 11/5/2018 12:17 PM CST -----  Contact: pt            Name of Who is Calling: Shira      What is the request in detail: see previous email. Pt states no one is returning her call. Please call and advise      Can the clinic reply by [facility]: yes      What Number to Call Back [facility]: [phone]                                     Click to add… Click to add… Click to add…

## 2022-05-17 ENCOUNTER — PATIENT MESSAGE (OUTPATIENT)
Dept: OTOLARYNGOLOGY | Facility: CLINIC | Age: 67
End: 2022-05-17
Payer: COMMERCIAL

## 2022-05-19 ENCOUNTER — OFFICE VISIT (OUTPATIENT)
Dept: OTOLARYNGOLOGY | Facility: CLINIC | Age: 67
End: 2022-05-19
Payer: COMMERCIAL

## 2022-05-19 VITALS
DIASTOLIC BLOOD PRESSURE: 68 MMHG | WEIGHT: 118.69 LBS | BODY MASS INDEX: 21.03 KG/M2 | SYSTOLIC BLOOD PRESSURE: 146 MMHG | TEMPERATURE: 98 F | HEART RATE: 63 BPM

## 2022-05-19 DIAGNOSIS — R09.89 CHRONIC THROAT CLEARING: ICD-10-CM

## 2022-05-19 DIAGNOSIS — K21.9 LARYNGOPHARYNGEAL REFLUX (LPR): Primary | ICD-10-CM

## 2022-05-19 DIAGNOSIS — Z87.820 HISTORY OF TRAUMATIC BRAIN INJURY: ICD-10-CM

## 2022-05-19 DIAGNOSIS — R51.9 NONINTRACTABLE EPISODIC HEADACHE, UNSPECIFIED HEADACHE TYPE: ICD-10-CM

## 2022-05-19 DIAGNOSIS — Z86.79 HISTORY OF INTRACRANIAL HEMORRHAGE: ICD-10-CM

## 2022-05-19 DIAGNOSIS — J30.9 ALLERGIC RHINITIS, UNSPECIFIED SEASONALITY, UNSPECIFIED TRIGGER: ICD-10-CM

## 2022-05-19 DIAGNOSIS — J34.2 NASAL SEPTAL DEVIATION: ICD-10-CM

## 2022-05-19 DIAGNOSIS — R13.10 DYSPHAGIA, UNSPECIFIED TYPE: ICD-10-CM

## 2022-05-19 DIAGNOSIS — Z74.09 IMPAIRED FUNCTIONAL MOBILITY, BALANCE, AND ENDURANCE: ICD-10-CM

## 2022-05-19 PROCEDURE — 1160F RVW MEDS BY RX/DR IN RCRD: CPT | Mod: CPTII,S$GLB,, | Performed by: SPECIALIST

## 2022-05-19 PROCEDURE — 1126F AMNT PAIN NOTED NONE PRSNT: CPT | Mod: CPTII,S$GLB,, | Performed by: SPECIALIST

## 2022-05-19 PROCEDURE — 3008F PR BODY MASS INDEX (BMI) DOCUMENTED: ICD-10-PCS | Mod: CPTII,S$GLB,, | Performed by: SPECIALIST

## 2022-05-19 PROCEDURE — 99999 PR PBB SHADOW E&M-EST. PATIENT-LVL IV: CPT | Mod: PBBFAC,,, | Performed by: SPECIALIST

## 2022-05-19 PROCEDURE — 1159F PR MEDICATION LIST DOCUMENTED IN MEDICAL RECORD: ICD-10-PCS | Mod: CPTII,S$GLB,, | Performed by: SPECIALIST

## 2022-05-19 PROCEDURE — 1101F PT FALLS ASSESS-DOCD LE1/YR: CPT | Mod: CPTII,S$GLB,, | Performed by: SPECIALIST

## 2022-05-19 PROCEDURE — 99213 PR OFFICE/OUTPT VISIT, EST, LEVL III, 20-29 MIN: ICD-10-PCS | Mod: S$GLB,,, | Performed by: SPECIALIST

## 2022-05-19 PROCEDURE — 99999 PR PBB SHADOW E&M-EST. PATIENT-LVL IV: ICD-10-PCS | Mod: PBBFAC,,, | Performed by: SPECIALIST

## 2022-05-19 PROCEDURE — 3008F BODY MASS INDEX DOCD: CPT | Mod: CPTII,S$GLB,, | Performed by: SPECIALIST

## 2022-05-19 PROCEDURE — 1159F MED LIST DOCD IN RCRD: CPT | Mod: CPTII,S$GLB,, | Performed by: SPECIALIST

## 2022-05-19 PROCEDURE — 3077F PR MOST RECENT SYSTOLIC BLOOD PRESSURE >= 140 MM HG: ICD-10-PCS | Mod: CPTII,S$GLB,, | Performed by: SPECIALIST

## 2022-05-19 PROCEDURE — 1160F PR REVIEW ALL MEDS BY PRESCRIBER/CLIN PHARMACIST DOCUMENTED: ICD-10-PCS | Mod: CPTII,S$GLB,, | Performed by: SPECIALIST

## 2022-05-19 PROCEDURE — 99213 OFFICE O/P EST LOW 20 MIN: CPT | Mod: S$GLB,,, | Performed by: SPECIALIST

## 2022-05-19 PROCEDURE — 3078F PR MOST RECENT DIASTOLIC BLOOD PRESSURE < 80 MM HG: ICD-10-PCS | Mod: CPTII,S$GLB,, | Performed by: SPECIALIST

## 2022-05-19 PROCEDURE — 3078F DIAST BP <80 MM HG: CPT | Mod: CPTII,S$GLB,, | Performed by: SPECIALIST

## 2022-05-19 PROCEDURE — 3288F FALL RISK ASSESSMENT DOCD: CPT | Mod: CPTII,S$GLB,, | Performed by: SPECIALIST

## 2022-05-19 PROCEDURE — 3077F SYST BP >= 140 MM HG: CPT | Mod: CPTII,S$GLB,, | Performed by: SPECIALIST

## 2022-05-19 PROCEDURE — 1101F PR PT FALLS ASSESS DOC 0-1 FALLS W/OUT INJ PAST YR: ICD-10-PCS | Mod: CPTII,S$GLB,, | Performed by: SPECIALIST

## 2022-05-19 PROCEDURE — 3288F PR FALLS RISK ASSESSMENT DOCUMENTED: ICD-10-PCS | Mod: CPTII,S$GLB,, | Performed by: SPECIALIST

## 2022-05-19 PROCEDURE — 1126F PR PAIN SEVERITY QUANTIFIED, NO PAIN PRESENT: ICD-10-PCS | Mod: CPTII,S$GLB,, | Performed by: SPECIALIST
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
## 2022-07-26 ENCOUNTER — OFFICE VISIT (OUTPATIENT)
Dept: OTOLARYNGOLOGY | Facility: CLINIC | Age: 67
End: 2022-07-26
Payer: COMMERCIAL

## 2022-07-26 VITALS
BODY MASS INDEX: 21.26 KG/M2 | SYSTOLIC BLOOD PRESSURE: 187 MMHG | TEMPERATURE: 98 F | HEART RATE: 56 BPM | DIASTOLIC BLOOD PRESSURE: 75 MMHG | WEIGHT: 120.06 LBS

## 2022-07-26 DIAGNOSIS — J30.9 ALLERGIC RHINITIS, UNSPECIFIED SEASONALITY, UNSPECIFIED TRIGGER: ICD-10-CM

## 2022-07-26 DIAGNOSIS — Z86.79 HISTORY OF INTRACRANIAL HEMORRHAGE: ICD-10-CM

## 2022-07-26 DIAGNOSIS — J34.2 NASAL SEPTAL DEVIATION: ICD-10-CM

## 2022-07-26 DIAGNOSIS — R13.10 DYSPHAGIA, UNSPECIFIED TYPE: ICD-10-CM

## 2022-07-26 DIAGNOSIS — K21.9 LARYNGOPHARYNGEAL REFLUX (LPR): Primary | ICD-10-CM

## 2022-07-26 DIAGNOSIS — R09.89 CHRONIC THROAT CLEARING: ICD-10-CM

## 2022-07-26 DIAGNOSIS — Z74.09 IMPAIRED FUNCTIONAL MOBILITY, BALANCE, AND ENDURANCE: ICD-10-CM

## 2022-07-26 PROCEDURE — 1126F PR PAIN SEVERITY QUANTIFIED, NO PAIN PRESENT: ICD-10-PCS | Mod: CPTII,S$GLB,, | Performed by: SPECIALIST

## 2022-07-26 PROCEDURE — 3077F SYST BP >= 140 MM HG: CPT | Mod: CPTII,S$GLB,, | Performed by: SPECIALIST

## 2022-07-26 PROCEDURE — 99214 OFFICE O/P EST MOD 30 MIN: CPT | Mod: 25,S$GLB,, | Performed by: SPECIALIST

## 2022-07-26 PROCEDURE — 3077F PR MOST RECENT SYSTOLIC BLOOD PRESSURE >= 140 MM HG: ICD-10-PCS | Mod: CPTII,S$GLB,, | Performed by: SPECIALIST

## 2022-07-26 PROCEDURE — 3288F PR FALLS RISK ASSESSMENT DOCUMENTED: ICD-10-PCS | Mod: CPTII,S$GLB,, | Performed by: SPECIALIST

## 2022-07-26 PROCEDURE — 1159F MED LIST DOCD IN RCRD: CPT | Mod: CPTII,S$GLB,, | Performed by: SPECIALIST

## 2022-07-26 PROCEDURE — 99999 PR PBB SHADOW E&M-EST. PATIENT-LVL IV: ICD-10-PCS | Mod: PBBFAC,,, | Performed by: SPECIALIST

## 2022-07-26 PROCEDURE — 3078F DIAST BP <80 MM HG: CPT | Mod: CPTII,S$GLB,, | Performed by: SPECIALIST

## 2022-07-26 PROCEDURE — 1160F RVW MEDS BY RX/DR IN RCRD: CPT | Mod: CPTII,S$GLB,, | Performed by: SPECIALIST

## 2022-07-26 PROCEDURE — 99214 PR OFFICE/OUTPT VISIT, EST, LEVL IV, 30-39 MIN: ICD-10-PCS | Mod: 25,S$GLB,, | Performed by: SPECIALIST

## 2022-07-26 PROCEDURE — 3008F PR BODY MASS INDEX (BMI) DOCUMENTED: ICD-10-PCS | Mod: CPTII,S$GLB,, | Performed by: SPECIALIST

## 2022-07-26 PROCEDURE — 1126F AMNT PAIN NOTED NONE PRSNT: CPT | Mod: CPTII,S$GLB,, | Performed by: SPECIALIST

## 2022-07-26 PROCEDURE — 1101F PT FALLS ASSESS-DOCD LE1/YR: CPT | Mod: CPTII,S$GLB,, | Performed by: SPECIALIST

## 2022-07-26 PROCEDURE — 1160F PR REVIEW ALL MEDS BY PRESCRIBER/CLIN PHARMACIST DOCUMENTED: ICD-10-PCS | Mod: CPTII,S$GLB,, | Performed by: SPECIALIST

## 2022-07-26 PROCEDURE — 3078F PR MOST RECENT DIASTOLIC BLOOD PRESSURE < 80 MM HG: ICD-10-PCS | Mod: CPTII,S$GLB,, | Performed by: SPECIALIST

## 2022-07-26 PROCEDURE — 3008F BODY MASS INDEX DOCD: CPT | Mod: CPTII,S$GLB,, | Performed by: SPECIALIST

## 2022-07-26 PROCEDURE — 1101F PR PT FALLS ASSESS DOC 0-1 FALLS W/OUT INJ PAST YR: ICD-10-PCS | Mod: CPTII,S$GLB,, | Performed by: SPECIALIST

## 2022-07-26 PROCEDURE — 3288F FALL RISK ASSESSMENT DOCD: CPT | Mod: CPTII,S$GLB,, | Performed by: SPECIALIST

## 2022-07-26 PROCEDURE — 31575 LARYNGOSCOPY: ICD-10-PCS | Mod: S$GLB,,, | Performed by: SPECIALIST

## 2022-07-26 PROCEDURE — 99999 PR PBB SHADOW E&M-EST. PATIENT-LVL IV: CPT | Mod: PBBFAC,,, | Performed by: SPECIALIST

## 2022-07-26 PROCEDURE — 31575 DIAGNOSTIC LARYNGOSCOPY: CPT | Mod: S$GLB,,, | Performed by: SPECIALIST

## 2022-07-26 PROCEDURE — 1159F PR MEDICATION LIST DOCUMENTED IN MEDICAL RECORD: ICD-10-PCS | Mod: CPTII,S$GLB,, | Performed by: SPECIALIST

## 2022-07-26 RX ORDER — POLYETHYLENE GLYCOL 3350, SODIUM SULFATE ANHYDROUS, SODIUM BICARBONATE, SODIUM CHLORIDE, POTASSIUM CHLORIDE 236; 22.74; 6.74; 5.86; 2.97 G/4L; G/4L; G/4L; G/4L; G/4L
POWDER, FOR SOLUTION ORAL
COMMUNITY
End: 2022-07-26

## 2022-07-26 RX ORDER — IBUPROFEN 800 MG/1
TABLET ORAL
COMMUNITY
Start: 2022-05-17

## 2022-07-26 NOTE — PROCEDURES
"Laryngoscopy    Date/Time: 7/26/2022 10:00 AM  Performed by: ДМИТРИЙ Macdonald MD  Authorized by: ДМИТРИЙ Macdonald MD     Time out: Immediately prior to procedure a "time out" was called to verify the correct patient, procedure, equipment, support staff and site/side marked as required.    Consent Done?:  Yes (Verbal)  Anesthesia:     Local anesthetic:  Lidocaine 4% and Juan-Synephrine 1/2% (Topical aerosol)    Patient tolerance:  Patient tolerated the procedure well with no immediate complications    Decongestion performed?: Yes    Laryngoscopy:     Areas examined:  Vocal cords, larynx, hypopharynx, oropharynx, nasopharynx and nasal cavities    Laryngoscope size:  4 mm (Flexible Nasolaryngoscopy)  Nose External:      No external nasal deformity  Nose Intranasal:      Mucosa no polyps (clear mucus bilaterally)     Mucosa ulcers not present     No mucosa lesions present (bilaterally)     Septum gross deformity (to the left)     Enlarged turbinates  Nasopharynx:      No mucosa lesions     Adenoids not present     Posterior choanae patent     Eustachian tube patent  Larynx/hypopharynx:      No epiglottis lesions     No epiglottis edema     No AE folds lesions     No vocal cord polyps     Equal and normal bilateral (Vocal cords move symmetrically, no mucosal lesion noted)     No hypopharynx lesions     No piriform sinus pooling     No piriform sinus lesions     Post cricoid edema (minimal, improved from last endoscopy)     Post cricoid erythema (Minimal, improved from last endoscopy)     Flexible video nasolaryngoscopy-nasal septal deviation and nasal changes allergic rhinitis; laryngeal changes consistent with laryngopharyngeal reflux that is improving on H2 agonist therapy      "
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
## 2022-10-26 DIAGNOSIS — Z12.31 OTHER SCREENING MAMMOGRAM: ICD-10-CM

## 2022-11-04 ENCOUNTER — OFFICE VISIT (OUTPATIENT)
Dept: OTOLARYNGOLOGY | Facility: CLINIC | Age: 67
End: 2022-11-04
Payer: COMMERCIAL

## 2022-11-04 VITALS
HEART RATE: 63 BPM | WEIGHT: 121.06 LBS | BODY MASS INDEX: 21.44 KG/M2 | TEMPERATURE: 98 F | SYSTOLIC BLOOD PRESSURE: 159 MMHG | DIASTOLIC BLOOD PRESSURE: 69 MMHG

## 2022-11-04 DIAGNOSIS — J30.9 ALLERGIC RHINITIS, UNSPECIFIED SEASONALITY, UNSPECIFIED TRIGGER: ICD-10-CM

## 2022-11-04 DIAGNOSIS — R09.89 CHRONIC THROAT CLEARING: Primary | ICD-10-CM

## 2022-11-04 DIAGNOSIS — J34.2 NASAL SEPTAL DEVIATION: ICD-10-CM

## 2022-11-04 DIAGNOSIS — Z86.79 HISTORY OF INTRACRANIAL HEMORRHAGE: ICD-10-CM

## 2022-11-04 DIAGNOSIS — K21.9 LARYNGOPHARYNGEAL REFLUX (LPR): ICD-10-CM

## 2022-11-04 DIAGNOSIS — H61.21 IMPACTED CERUMEN OF RIGHT EAR: ICD-10-CM

## 2022-11-04 PROCEDURE — 99214 OFFICE O/P EST MOD 30 MIN: CPT | Mod: S$GLB,,, | Performed by: SPECIALIST

## 2022-11-04 PROCEDURE — 3288F FALL RISK ASSESSMENT DOCD: CPT | Mod: CPTII,S$GLB,, | Performed by: SPECIALIST

## 2022-11-04 PROCEDURE — 1101F PT FALLS ASSESS-DOCD LE1/YR: CPT | Mod: CPTII,S$GLB,, | Performed by: SPECIALIST

## 2022-11-04 PROCEDURE — 99214 PR OFFICE/OUTPT VISIT, EST, LEVL IV, 30-39 MIN: ICD-10-PCS | Mod: S$GLB,,, | Performed by: SPECIALIST

## 2022-11-04 PROCEDURE — 99999 PR PBB SHADOW E&M-EST. PATIENT-LVL IV: ICD-10-PCS | Mod: PBBFAC,,, | Performed by: SPECIALIST

## 2022-11-04 PROCEDURE — 3288F PR FALLS RISK ASSESSMENT DOCUMENTED: ICD-10-PCS | Mod: CPTII,S$GLB,, | Performed by: SPECIALIST

## 2022-11-04 PROCEDURE — 3078F DIAST BP <80 MM HG: CPT | Mod: CPTII,S$GLB,, | Performed by: SPECIALIST

## 2022-11-04 PROCEDURE — 1126F PR PAIN SEVERITY QUANTIFIED, NO PAIN PRESENT: ICD-10-PCS | Mod: CPTII,S$GLB,, | Performed by: SPECIALIST

## 2022-11-04 PROCEDURE — 3078F PR MOST RECENT DIASTOLIC BLOOD PRESSURE < 80 MM HG: ICD-10-PCS | Mod: CPTII,S$GLB,, | Performed by: SPECIALIST

## 2022-11-04 PROCEDURE — 3008F PR BODY MASS INDEX (BMI) DOCUMENTED: ICD-10-PCS | Mod: CPTII,S$GLB,, | Performed by: SPECIALIST

## 2022-11-04 PROCEDURE — 1101F PR PT FALLS ASSESS DOC 0-1 FALLS W/OUT INJ PAST YR: ICD-10-PCS | Mod: CPTII,S$GLB,, | Performed by: SPECIALIST

## 2022-11-04 PROCEDURE — 1160F RVW MEDS BY RX/DR IN RCRD: CPT | Mod: CPTII,S$GLB,, | Performed by: SPECIALIST

## 2022-11-04 PROCEDURE — 1160F PR REVIEW ALL MEDS BY PRESCRIBER/CLIN PHARMACIST DOCUMENTED: ICD-10-PCS | Mod: CPTII,S$GLB,, | Performed by: SPECIALIST

## 2022-11-04 PROCEDURE — 3077F SYST BP >= 140 MM HG: CPT | Mod: CPTII,S$GLB,, | Performed by: SPECIALIST

## 2022-11-04 PROCEDURE — 3077F PR MOST RECENT SYSTOLIC BLOOD PRESSURE >= 140 MM HG: ICD-10-PCS | Mod: CPTII,S$GLB,, | Performed by: SPECIALIST

## 2022-11-04 PROCEDURE — 3008F BODY MASS INDEX DOCD: CPT | Mod: CPTII,S$GLB,, | Performed by: SPECIALIST

## 2022-11-04 PROCEDURE — 1159F PR MEDICATION LIST DOCUMENTED IN MEDICAL RECORD: ICD-10-PCS | Mod: CPTII,S$GLB,, | Performed by: SPECIALIST

## 2022-11-04 PROCEDURE — 99999 PR PBB SHADOW E&M-EST. PATIENT-LVL IV: CPT | Mod: PBBFAC,,, | Performed by: SPECIALIST

## 2022-11-04 PROCEDURE — 1126F AMNT PAIN NOTED NONE PRSNT: CPT | Mod: CPTII,S$GLB,, | Performed by: SPECIALIST

## 2022-11-04 PROCEDURE — 1159F MED LIST DOCD IN RCRD: CPT | Mod: CPTII,S$GLB,, | Performed by: SPECIALIST

## 2022-11-04 RX ORDER — PENICILLIN V POTASSIUM 500 MG/1
TABLET, FILM COATED ORAL
COMMUNITY
End: 2023-01-25
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
## 2022-11-04 NOTE — PROCEDURES
"Ear Cerumen Removal    Date/Time: 11/4/2022 1:20 PM  Performed by: ДМИТРИЙ Macdonald MD  Authorized by: ДМИТРИЙ Macdonald MD     Time out: Immediately prior to procedure a "time out" was called to verify the correct patient, procedure, equipment, support staff and site/side marked as required.    Consent Done?:  Yes (Verbal)    Local anesthetic:  None  Location details:  Right ear  Procedure type comment:  Wire loop  Cerumen  Removal Results:  Cerumen completely removed  Patient tolerance:  Patient tolerated the procedure well with no immediate complications  "

## 2022-12-22 RX ORDER — LEVOTHYROXINE SODIUM 50 UG/1
TABLET ORAL
Qty: 90 TABLET | Refills: 0 | Status: SHIPPED | OUTPATIENT
Start: 2022-12-22 | End: 2023-05-02

## 2023-01-09 ENCOUNTER — HOSPITAL ENCOUNTER (OUTPATIENT)
Dept: RADIOLOGY | Facility: HOSPITAL | Age: 68
Discharge: HOME | End: 2023-01-09
Attending: FAMILY MEDICINE
Payer: COMMERCIAL

## 2023-01-09 VITALS — BODY MASS INDEX: 21.44 KG/M2 | WEIGHT: 121 LBS | HEIGHT: 63 IN

## 2023-01-09 DIAGNOSIS — Z12.31 OTHER SCREENING MAMMOGRAM: ICD-10-CM

## 2023-01-09 PROCEDURE — 77067 MAMMO DIGITAL SCREENING BILAT WITH TOMO: ICD-10-PCS | Mod: 26,,, | Performed by: RADIOLOGY

## 2023-01-09 PROCEDURE — 77063 MAMMO DIGITAL SCREENING BILAT WITH TOMO: ICD-10-PCS | Mod: 26,,, | Performed by: RADIOLOGY

## 2023-01-09 PROCEDURE — 77067 SCR MAMMO BI INCL CAD: CPT | Mod: TC,PO

## 2023-01-09 PROCEDURE — 77067 SCR MAMMO BI INCL CAD: CPT | Mod: 26,,, | Performed by: RADIOLOGY

## 2023-01-09 PROCEDURE — 77063 BREAST TOMOSYNTHESIS BI: CPT | Mod: 26,,, | Performed by: RADIOLOGY

## 2023-01-11 ENCOUNTER — PATIENT OUTREACH (OUTPATIENT)
Dept: ADMINISTRATIVE | Facility: HOSPITAL | Age: 68
End: 2023-01-11
Payer: COMMERCIAL

## 2023-01-25 ENCOUNTER — LAB VISIT (OUTPATIENT)
Dept: LAB | Facility: HOSPITAL | Age: 68
End: 2023-01-25
Attending: FAMILY MEDICINE
Payer: COMMERCIAL

## 2023-01-25 ENCOUNTER — OFFICE VISIT (OUTPATIENT)
Dept: FAMILY MEDICINE | Facility: CLINIC | Age: 68
End: 2023-01-25
Attending: FAMILY MEDICINE
Payer: COMMERCIAL

## 2023-01-25 DIAGNOSIS — I10 ESSENTIAL HYPERTENSION: ICD-10-CM

## 2023-01-25 DIAGNOSIS — G40.802 OTHER EPILEPSY WITHOUT STATUS EPILEPTICUS, NOT INTRACTABLE: ICD-10-CM

## 2023-01-25 DIAGNOSIS — C18.7 MALIGNANT NEOPLASM OF SIGMOID COLON: ICD-10-CM

## 2023-01-25 DIAGNOSIS — E03.9 ACQUIRED HYPOTHYROIDISM: ICD-10-CM

## 2023-01-25 DIAGNOSIS — E78.2 MIXED HYPERLIPIDEMIA: ICD-10-CM

## 2023-01-25 DIAGNOSIS — Z00.00 ANNUAL PHYSICAL EXAM: Primary | ICD-10-CM

## 2023-01-25 DIAGNOSIS — Z00.00 ANNUAL PHYSICAL EXAM: ICD-10-CM

## 2023-01-25 LAB
ALBUMIN SERPL BCP-MCNC: 4.3 G/DL (ref 3.5–5.2)
ALP SERPL-CCNC: 81 U/L (ref 55–135)
ALT SERPL W/O P-5'-P-CCNC: 10 U/L (ref 10–44)
ANION GAP SERPL CALC-SCNC: 12 MMOL/L (ref 8–16)
AST SERPL-CCNC: 20 U/L (ref 10–40)
BASOPHILS # BLD AUTO: 0.04 K/UL (ref 0–0.2)
BASOPHILS NFR BLD: 0.7 % (ref 0–1.9)
BILIRUB SERPL-MCNC: 0.6 MG/DL (ref 0.1–1)
BUN SERPL-MCNC: 16 MG/DL (ref 8–23)
CALCIUM SERPL-MCNC: 10.6 MG/DL (ref 8.7–10.5)
CHLORIDE SERPL-SCNC: 105 MMOL/L (ref 95–110)
CHOLEST SERPL-MCNC: 214 MG/DL (ref 120–199)
CHOLEST/HDLC SERPL: 3.1 {RATIO} (ref 2–5)
CO2 SERPL-SCNC: 25 MMOL/L (ref 23–29)
CREAT SERPL-MCNC: 1 MG/DL (ref 0.5–1.4)
DIFFERENTIAL METHOD: ABNORMAL
EOSINOPHIL # BLD AUTO: 0.1 K/UL (ref 0–0.5)
EOSINOPHIL NFR BLD: 2.4 % (ref 0–8)
ERYTHROCYTE [DISTWIDTH] IN BLOOD BY AUTOMATED COUNT: 12.9 % (ref 11.5–14.5)
EST. GFR  (NO RACE VARIABLE): >60 ML/MIN/1.73 M^2
GLUCOSE SERPL-MCNC: 79 MG/DL (ref 70–110)
HCT VFR BLD AUTO: 42 % (ref 37–48.5)
HDLC SERPL-MCNC: 70 MG/DL (ref 40–75)
HDLC SERPL: 32.7 % (ref 20–50)
HGB BLD-MCNC: 13.2 G/DL (ref 12–16)
IMM GRANULOCYTES # BLD AUTO: 0.01 K/UL (ref 0–0.04)
IMM GRANULOCYTES NFR BLD AUTO: 0.2 % (ref 0–0.5)
LDLC SERPL CALC-MCNC: 127.2 MG/DL (ref 63–159)
LYMPHOCYTES # BLD AUTO: 1.4 K/UL (ref 1–4.8)
LYMPHOCYTES NFR BLD: 25.1 % (ref 18–48)
MCH RBC QN AUTO: 32.1 PG (ref 27–31)
MCHC RBC AUTO-ENTMCNC: 31.4 G/DL (ref 32–36)
MCV RBC AUTO: 102 FL (ref 82–98)
MONOCYTES # BLD AUTO: 0.8 K/UL (ref 0.3–1)
MONOCYTES NFR BLD: 13.8 % (ref 4–15)
NEUTROPHILS # BLD AUTO: 3.3 K/UL (ref 1.8–7.7)
NEUTROPHILS NFR BLD: 57.8 % (ref 38–73)
NONHDLC SERPL-MCNC: 144 MG/DL
NRBC BLD-RTO: 0 /100 WBC
PLATELET # BLD AUTO: 229 K/UL (ref 150–450)
PMV BLD AUTO: 10.2 FL (ref 9.2–12.9)
POTASSIUM SERPL-SCNC: 4.2 MMOL/L (ref 3.5–5.1)
PROT SERPL-MCNC: 8 G/DL (ref 6–8.4)
RBC # BLD AUTO: 4.11 M/UL (ref 4–5.4)
SODIUM SERPL-SCNC: 142 MMOL/L (ref 136–145)
TRIGL SERPL-MCNC: 84 MG/DL (ref 30–150)
TSH SERPL DL<=0.005 MIU/L-ACNC: 0.56 UIU/ML (ref 0.4–4)
WBC # BLD AUTO: 5.73 K/UL (ref 3.9–12.7)

## 2023-01-25 PROCEDURE — 3288F PR FALLS RISK ASSESSMENT DOCUMENTED: ICD-10-PCS | Mod: CPTII,S$GLB,, | Performed by: FAMILY MEDICINE

## 2023-01-25 PROCEDURE — 1101F PR PT FALLS ASSESS DOC 0-1 FALLS W/OUT INJ PAST YR: ICD-10-PCS | Mod: CPTII,S$GLB,, | Performed by: FAMILY MEDICINE

## 2023-01-25 PROCEDURE — 99397 PER PM REEVAL EST PAT 65+ YR: CPT | Mod: S$GLB,,, | Performed by: FAMILY MEDICINE

## 2023-01-25 PROCEDURE — 99999 PR PBB SHADOW E&M-EST. PATIENT-LVL V: ICD-10-PCS | Mod: PBBFAC,,, | Performed by: FAMILY MEDICINE

## 2023-01-25 PROCEDURE — 85025 COMPLETE CBC W/AUTO DIFF WBC: CPT | Performed by: FAMILY MEDICINE

## 2023-01-25 PROCEDURE — 3008F BODY MASS INDEX DOCD: CPT | Mod: CPTII,S$GLB,, | Performed by: FAMILY MEDICINE

## 2023-01-25 PROCEDURE — 99397 PR PREVENTIVE VISIT,EST,65 & OVER: ICD-10-PCS | Mod: S$GLB,,, | Performed by: FAMILY MEDICINE

## 2023-01-25 PROCEDURE — 3288F FALL RISK ASSESSMENT DOCD: CPT | Mod: CPTII,S$GLB,, | Performed by: FAMILY MEDICINE

## 2023-01-25 PROCEDURE — 3077F SYST BP >= 140 MM HG: CPT | Mod: CPTII,S$GLB,, | Performed by: FAMILY MEDICINE

## 2023-01-25 PROCEDURE — 3079F PR MOST RECENT DIASTOLIC BLOOD PRESSURE 80-89 MM HG: ICD-10-PCS | Mod: CPTII,S$GLB,, | Performed by: FAMILY MEDICINE

## 2023-01-25 PROCEDURE — 84443 ASSAY THYROID STIM HORMONE: CPT | Performed by: FAMILY MEDICINE

## 2023-01-25 PROCEDURE — 80061 LIPID PANEL: CPT | Performed by: FAMILY MEDICINE

## 2023-01-25 PROCEDURE — 36415 COLL VENOUS BLD VENIPUNCTURE: CPT | Mod: PO | Performed by: FAMILY MEDICINE

## 2023-01-25 PROCEDURE — 99999 PR PBB SHADOW E&M-EST. PATIENT-LVL V: CPT | Mod: PBBFAC,,, | Performed by: FAMILY MEDICINE

## 2023-01-25 PROCEDURE — 1126F AMNT PAIN NOTED NONE PRSNT: CPT | Mod: CPTII,S$GLB,, | Performed by: FAMILY MEDICINE

## 2023-01-25 PROCEDURE — 1159F PR MEDICATION LIST DOCUMENTED IN MEDICAL RECORD: ICD-10-PCS | Mod: CPTII,S$GLB,, | Performed by: FAMILY MEDICINE

## 2023-01-25 PROCEDURE — 3008F PR BODY MASS INDEX (BMI) DOCUMENTED: ICD-10-PCS | Mod: CPTII,S$GLB,, | Performed by: FAMILY MEDICINE

## 2023-01-25 PROCEDURE — 1101F PT FALLS ASSESS-DOCD LE1/YR: CPT | Mod: CPTII,S$GLB,, | Performed by: FAMILY MEDICINE

## 2023-01-25 PROCEDURE — 3079F DIAST BP 80-89 MM HG: CPT | Mod: CPTII,S$GLB,, | Performed by: FAMILY MEDICINE

## 2023-01-25 PROCEDURE — 1126F PR PAIN SEVERITY QUANTIFIED, NO PAIN PRESENT: ICD-10-PCS | Mod: CPTII,S$GLB,, | Performed by: FAMILY MEDICINE

## 2023-01-25 PROCEDURE — 1159F MED LIST DOCD IN RCRD: CPT | Mod: CPTII,S$GLB,, | Performed by: FAMILY MEDICINE

## 2023-01-25 PROCEDURE — 80053 COMPREHEN METABOLIC PANEL: CPT | Performed by: FAMILY MEDICINE

## 2023-01-25 PROCEDURE — 3077F PR MOST RECENT SYSTOLIC BLOOD PRESSURE >= 140 MM HG: ICD-10-PCS | Mod: CPTII,S$GLB,, | Performed by: FAMILY MEDICINE

## 2023-01-25 RX ORDER — TRIAMTERENE AND HYDROCHLOROTHIAZIDE 37.5; 25 MG/1; MG/1
1 CAPSULE ORAL EVERY MORNING
Qty: 90 CAPSULE | Refills: 0 | Status: SHIPPED | OUTPATIENT
Start: 2023-01-25 | End: 2023-03-01

## 2023-01-26 ENCOUNTER — LAB VISIT (OUTPATIENT)
Dept: LAB | Facility: HOSPITAL | Age: 68
End: 2023-01-26
Attending: FAMILY MEDICINE
Payer: COMMERCIAL

## 2023-01-26 DIAGNOSIS — Z00.00 ANNUAL PHYSICAL EXAM: ICD-10-CM

## 2023-01-26 LAB
BACTERIA #/AREA URNS AUTO: ABNORMAL /HPF
BILIRUB UR QL STRIP: NEGATIVE
CAOX CRY UR QL COMP ASSIST: ABNORMAL
CLARITY UR REFRACT.AUTO: CLEAR
COLOR UR AUTO: YELLOW
GLUCOSE UR QL STRIP: NEGATIVE
HGB UR QL STRIP: NEGATIVE
KETONES UR QL STRIP: NEGATIVE
LEUKOCYTE ESTERASE UR QL STRIP: ABNORMAL
MICROSCOPIC COMMENT: ABNORMAL
NITRITE UR QL STRIP: NEGATIVE
NON-SQ EPI CELLS #/AREA URNS AUTO: 0 /HPF
PH UR STRIP: 7 [PH] (ref 5–8)
PROT UR QL STRIP: NEGATIVE
RBC #/AREA URNS AUTO: 1 /HPF (ref 0–4)
SP GR UR STRIP: 1.01 (ref 1–1.03)
SQUAMOUS #/AREA URNS AUTO: 1 /HPF
URN SPEC COLLECT METH UR: ABNORMAL
WBC #/AREA URNS AUTO: 15 /HPF (ref 0–5)

## 2023-01-26 PROCEDURE — 81001 URINALYSIS AUTO W/SCOPE: CPT | Performed by: FAMILY MEDICINE

## 2023-01-27 ENCOUNTER — PATIENT OUTREACH (OUTPATIENT)
Dept: ADMINISTRATIVE | Facility: HOSPITAL | Age: 68
End: 2023-01-27
Payer: COMMERCIAL

## 2023-01-28 ENCOUNTER — PATIENT MESSAGE (OUTPATIENT)
Dept: FAMILY MEDICINE | Facility: CLINIC | Age: 68
End: 2023-01-28
Payer: COMMERCIAL

## 2023-01-28 VITALS
OXYGEN SATURATION: 99 % | HEIGHT: 63 IN | BODY MASS INDEX: 21.32 KG/M2 | WEIGHT: 120.31 LBS | DIASTOLIC BLOOD PRESSURE: 80 MMHG | HEART RATE: 66 BPM | SYSTOLIC BLOOD PRESSURE: 150 MMHG

## 2023-01-28 NOTE — PROGRESS NOTES
Subjective:       Patient ID: Shira Vega is a 67 y.o. female.    Chief Complaint: Annual Exam    HPI  Pt is here for annual exam pt is well no sob/cp no cough chest congestion no sore throat  Pt with h/o colon ca stable denies n/v/f/c/d/c no change in bowel habits no brbpr  Pt has seizure disorder followed in out of [facility] no recent events stable on meds  Pt has htn bp elevated today no meds today she is on a blocker and diuretic combo declines med change   Pt has hypothyroid no excessive fatigue no temp intolerance  Pt has hypercholesterolemia on statin no muscle aches   Review of Systems   Constitutional:  Negative for activity change, chills, diaphoresis, fatigue, fever and unexpected weight change.   HENT:  Negative for congestion, ear discharge, ear pain, hearing loss, postnasal drip, rhinorrhea, sinus pressure, sneezing, sore throat, trouble swallowing and voice change.    Eyes:  Negative for photophobia, discharge, redness, itching and visual disturbance.   Respiratory:  Negative for cough, chest tightness, shortness of breath and wheezing.    Cardiovascular:  Negative for chest pain, palpitations and leg swelling.   Gastrointestinal:  Negative for abdominal pain, anal bleeding, blood in stool, constipation, diarrhea, nausea, rectal pain and vomiting.   Endocrine: Negative for polydipsia and polyuria.   Genitourinary:  Negative for difficulty urinating, dyspareunia, dysuria, flank pain, frequency, hematuria, menstrual problem, pelvic pain, urgency, vaginal bleeding and vaginal discharge.   Musculoskeletal:  Negative for arthralgias, back pain, joint swelling and neck pain.   Skin:  Negative for color change and rash.   Neurological:  Negative for dizziness, speech difficulty, weakness, light-headedness, numbness and headaches.   Hematological:  Does not bruise/bleed easily.   Psychiatric/Behavioral:  Negative for agitation, confusion, decreased concentration, dysphoric mood, sleep disturbance  "and suicidal ideas. The patient is not nervous/anxious.      Objective:    BP (!) 150/80   Pulse 66   Ht 5' 3" (1.6 m)   Wt 54.6 kg (120 lb 4.8 oz)   SpO2 99%   BMI 21.31 kg/m²     Physical Exam  Constitutional:       Appearance: Normal appearance. She is well-developed. She is not ill-appearing.   HENT:      Head: Normocephalic and atraumatic.      Right Ear: External ear normal.      Left Ear: External ear normal.      Nose: Nose normal.   Eyes:      General:         Right eye: No discharge.         Left eye: No discharge.      Extraocular Movements: Extraocular movements intact.      Conjunctiva/sclera: Conjunctivae normal.      Pupils: Pupils are equal, round, and reactive to light.   Neck:      Thyroid: No thyromegaly.   Cardiovascular:      Rate and Rhythm: Normal rate and regular rhythm.      Heart sounds: Normal heart sounds. No murmur heard.    No friction rub. No gallop.   Pulmonary:      Effort: Pulmonary effort is normal.      Breath sounds: Normal breath sounds. No wheezing or rales.   Abdominal:      General: Bowel sounds are normal. There is no distension.      Palpations: Abdomen is soft.      Tenderness: There is no abdominal tenderness. There is no guarding or rebound.   Genitourinary:     Vagina: Normal.   Musculoskeletal:         General: No tenderness. Normal range of motion.      Cervical back: Normal range of motion and neck supple.   Lymphadenopathy:      Cervical: No cervical adenopathy.   Skin:     General: Skin is warm and dry.      Findings: No erythema or rash.   Neurological:      General: No focal deficit present.      Mental Status: She is alert and oriented to person, place, and time.      Cranial Nerves: No cranial nerve deficit.      Motor: No abnormal muscle tone.      Coordination: Coordination normal.   Psychiatric:         Behavior: Behavior normal.         Thought Content: Thought content normal.         Judgment: Judgment normal.       Assessment:       1. Annual " "physical exam    2. Malignant neoplasm of sigmoid colon    3. Other epilepsy without status epilepticus, not intractable    4. Essential hypertension    5. Mixed hyperlipidemia    6. Acquired hypothyroidism          Plan:     Orders labs urine  F/u neruology  Low fat low salt diet  Graded exercise  Cont meds  Bp log   Rtc 2 weeks bp log virtual/audio fine       "This note will not be shared with the patient."     "

## 2023-02-01 ENCOUNTER — TELEPHONE (OUTPATIENT)
Dept: FAMILY MEDICINE | Facility: CLINIC | Age: 68
End: 2023-02-01
Payer: COMMERCIAL

## 2023-02-01 NOTE — TELEPHONE ENCOUNTER
Could have been from dehydration as it is a diuretic please have her hold the medication and increase her fluids please schedule her for  virtual visit with a bp log, per Dr. Price.

## 2023-02-01 NOTE — TELEPHONE ENCOUNTER
"----- Message from Christina Kemp sent at 2/1/2023 10:43 AM CST -----  Regarding: traumatic epiosde  Name of caller: Otf ( )       What is the requesting detail: Otf was calling to inform staff that the pt experienced a Seizure like episode this morning. When 911 arrived they took her blood pressure and when sitting it was 140/80, when standing  it was 111/80 . She had skin tinting on the back of her hands. Pt is concerned that the new medication" triamterene-hydrochlorothiazide 37.5-25 mg (DYAZIDE) 37.5-25 mg per capsule 90 capsule" may be the cause of the episode. Please give her a call back to let her know what her options are.      Can the clinic reply by [facility]: yes       What number to call back: [phone]           "
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
## 2023-02-02 ENCOUNTER — OFFICE VISIT (OUTPATIENT)
Dept: FAMILY MEDICINE | Facility: CLINIC | Age: 68
End: 2023-02-02
Attending: FAMILY MEDICINE
Payer: COMMERCIAL

## 2023-02-02 ENCOUNTER — TELEPHONE (OUTPATIENT)
Dept: FAMILY MEDICINE | Facility: CLINIC | Age: 68
End: 2023-02-02
Payer: COMMERCIAL

## 2023-02-02 ENCOUNTER — LAB VISIT (OUTPATIENT)
Dept: LAB | Facility: HOSPITAL | Age: 68
End: 2023-02-02
Attending: FAMILY MEDICINE
Payer: COMMERCIAL

## 2023-02-02 VITALS
BODY MASS INDEX: 21.05 KG/M2 | HEIGHT: 63 IN | SYSTOLIC BLOOD PRESSURE: 141 MMHG | RESPIRATION RATE: 16 BRPM | WEIGHT: 118.81 LBS | DIASTOLIC BLOOD PRESSURE: 61 MMHG | HEART RATE: 66 BPM

## 2023-02-02 DIAGNOSIS — I10 ESSENTIAL HYPERTENSION: ICD-10-CM

## 2023-02-02 DIAGNOSIS — I10 ESSENTIAL HYPERTENSION: Primary | ICD-10-CM

## 2023-02-02 LAB
ANION GAP SERPL CALC-SCNC: 11 MMOL/L (ref 8–16)
BUN SERPL-MCNC: 29 MG/DL (ref 8–23)
CALCIUM SERPL-MCNC: 10.9 MG/DL (ref 8.7–10.5)
CHLORIDE SERPL-SCNC: 97 MMOL/L (ref 95–110)
CO2 SERPL-SCNC: 28 MMOL/L (ref 23–29)
CREAT SERPL-MCNC: 1.6 MG/DL (ref 0.5–1.4)
EST. GFR  (NO RACE VARIABLE): 34.7 ML/MIN/1.73 M^2
GLUCOSE SERPL-MCNC: 110 MG/DL (ref 70–110)
POTASSIUM SERPL-SCNC: 4.4 MMOL/L (ref 3.5–5.1)
SODIUM SERPL-SCNC: 136 MMOL/L (ref 136–145)

## 2023-02-02 PROCEDURE — 3008F PR BODY MASS INDEX (BMI) DOCUMENTED: ICD-10-PCS | Mod: CPTII,S$GLB,, | Performed by: FAMILY MEDICINE

## 2023-02-02 PROCEDURE — 99999 PR PBB SHADOW E&M-EST. PATIENT-LVL V: ICD-10-PCS | Mod: PBBFAC,,, | Performed by: FAMILY MEDICINE

## 2023-02-02 PROCEDURE — 3078F PR MOST RECENT DIASTOLIC BLOOD PRESSURE < 80 MM HG: ICD-10-PCS | Mod: CPTII,S$GLB,, | Performed by: FAMILY MEDICINE

## 2023-02-02 PROCEDURE — 3077F SYST BP >= 140 MM HG: CPT | Mod: CPTII,S$GLB,, | Performed by: FAMILY MEDICINE

## 2023-02-02 PROCEDURE — 3288F PR FALLS RISK ASSESSMENT DOCUMENTED: ICD-10-PCS | Mod: CPTII,S$GLB,, | Performed by: FAMILY MEDICINE

## 2023-02-02 PROCEDURE — 3288F FALL RISK ASSESSMENT DOCD: CPT | Mod: CPTII,S$GLB,, | Performed by: FAMILY MEDICINE

## 2023-02-02 PROCEDURE — 99213 OFFICE O/P EST LOW 20 MIN: CPT | Mod: S$GLB,,, | Performed by: FAMILY MEDICINE

## 2023-02-02 PROCEDURE — 99999 PR PBB SHADOW E&M-EST. PATIENT-LVL V: CPT | Mod: PBBFAC,,, | Performed by: FAMILY MEDICINE

## 2023-02-02 PROCEDURE — 1101F PT FALLS ASSESS-DOCD LE1/YR: CPT | Mod: CPTII,S$GLB,, | Performed by: FAMILY MEDICINE

## 2023-02-02 PROCEDURE — 1101F PR PT FALLS ASSESS DOC 0-1 FALLS W/OUT INJ PAST YR: ICD-10-PCS | Mod: CPTII,S$GLB,, | Performed by: FAMILY MEDICINE

## 2023-02-02 PROCEDURE — 36415 COLL VENOUS BLD VENIPUNCTURE: CPT | Mod: PO | Performed by: FAMILY MEDICINE

## 2023-02-02 PROCEDURE — 3008F BODY MASS INDEX DOCD: CPT | Mod: CPTII,S$GLB,, | Performed by: FAMILY MEDICINE

## 2023-02-02 PROCEDURE — 3077F PR MOST RECENT SYSTOLIC BLOOD PRESSURE >= 140 MM HG: ICD-10-PCS | Mod: CPTII,S$GLB,, | Performed by: FAMILY MEDICINE

## 2023-02-02 PROCEDURE — 80048 BASIC METABOLIC PNL TOTAL CA: CPT | Performed by: FAMILY MEDICINE

## 2023-02-02 PROCEDURE — 3078F DIAST BP <80 MM HG: CPT | Mod: CPTII,S$GLB,, | Performed by: FAMILY MEDICINE

## 2023-02-02 PROCEDURE — 99213 PR OFFICE/OUTPT VISIT, EST, LEVL III, 20-29 MIN: ICD-10-PCS | Mod: S$GLB,,, | Performed by: FAMILY MEDICINE

## 2023-02-02 NOTE — TELEPHONE ENCOUNTER
----- Message from Angeli Peralta sent at 2/2/2023  8:53 AM CST -----  Regarding: Patient call back  Who called:Otf ()    What is the request in detail: Patient is requesting a call back. Pt is frustrated that he has not heard back from the staff regarding pt blood pressure. He is asking to speak with a supervisor. He would like to further discuss.   Please advise.    Can the clinic reply by [facility]? No    Best call back number: [phone]    Additional Information: N/A

## 2023-02-02 NOTE — TELEPHONE ENCOUNTER
I spoke with Mr. Vanessa this morning.  He was instructed to have patient hold her Triamterene-HCTZ 37.5-25mg tablets in increase her fluid intake.  He was also offered and appt to bring the patient into the office on today at 2:20pm.  He said that he needed to speak with the patient and that he will call me back to let me know if patient will come in for her visit on today.  Jackie

## 2023-02-02 NOTE — TELEPHONE ENCOUNTER
----- Message from Sherrie Valdes sent at 2/2/2023  9:09 AM CST -----  Regarding: Callback  .Type: Patient Call Back    Who called:Spouse Otf    What is the request in detail: Spouse is very upset that he hasn't heard back from the provider office in regards to his wife. Spouse said the patient almost fell yesterday and is really needing to speak to the provider.    Can the clinic reply by [facility]?    Would the patient rather a call back or a response via [facility]? callback    Best call back number:.[phone] (mobile)      Additional Information: He is requesting a callback as soon as possible on today.

## 2023-02-02 NOTE — TELEPHONE ENCOUNTER
I spoke with Mr. Vanessa and he confirmed that the patient will come into the office today for her 2:20pm appt. thanks

## 2023-02-02 NOTE — PATIENT INSTRUCTIONS
Shira,     We are always striving for excellence. Should you receive a patient experience survey via text message, electronically, or by mail, we would appreciate if you would take a few moments to give us your feedback. These surveys let us know our strengths as well as areas of opportunity for improvement to better serve you.    Thank you for your time,  Caty Gayle LPN      Your test results will be communicated to you via : [facility], Telephone or Letter.   If you have not received your test results in one week, please contact the clinic at [phone].

## 2023-02-02 NOTE — TELEPHONE ENCOUNTER
Message has been scheduled to come in for an appt at 2:20pm and her  has confirmed that he will bring her into the office. thanks

## 2023-02-10 ENCOUNTER — OFFICE VISIT (OUTPATIENT)
Dept: OTOLARYNGOLOGY | Facility: CLINIC | Age: 68
End: 2023-02-10
Payer: COMMERCIAL

## 2023-02-10 VITALS
DIASTOLIC BLOOD PRESSURE: 63 MMHG | TEMPERATURE: 98 F | BODY MASS INDEX: 21.09 KG/M2 | HEART RATE: 70 BPM | WEIGHT: 119.06 LBS | SYSTOLIC BLOOD PRESSURE: 143 MMHG

## 2023-02-10 DIAGNOSIS — Z74.09 IMPAIRED FUNCTIONAL MOBILITY, BALANCE, AND ENDURANCE: ICD-10-CM

## 2023-02-10 DIAGNOSIS — R09.89 CHRONIC THROAT CLEARING: ICD-10-CM

## 2023-02-10 DIAGNOSIS — K21.9 LARYNGOPHARYNGEAL REFLUX (LPR): Primary | ICD-10-CM

## 2023-02-10 DIAGNOSIS — R13.10 DYSPHAGIA, UNSPECIFIED TYPE: ICD-10-CM

## 2023-02-10 DIAGNOSIS — H81.4 VERTIGO OF CENTRAL ORIGIN: ICD-10-CM

## 2023-02-10 DIAGNOSIS — J30.9 ALLERGIC RHINITIS, UNSPECIFIED SEASONALITY, UNSPECIFIED TRIGGER: ICD-10-CM

## 2023-02-10 DIAGNOSIS — Z86.79 HISTORY OF INTRACRANIAL HEMORRHAGE: ICD-10-CM

## 2023-02-10 PROCEDURE — 3288F PR FALLS RISK ASSESSMENT DOCUMENTED: ICD-10-PCS | Mod: CPTII,S$GLB,, | Performed by: SPECIALIST

## 2023-02-10 PROCEDURE — 3008F BODY MASS INDEX DOCD: CPT | Mod: CPTII,S$GLB,, | Performed by: SPECIALIST

## 2023-02-10 PROCEDURE — 99999 PR PBB SHADOW E&M-EST. PATIENT-LVL IV: ICD-10-PCS | Mod: PBBFAC,,, | Performed by: SPECIALIST

## 2023-02-10 PROCEDURE — 1126F AMNT PAIN NOTED NONE PRSNT: CPT | Mod: CPTII,S$GLB,, | Performed by: SPECIALIST

## 2023-02-10 PROCEDURE — 3077F PR MOST RECENT SYSTOLIC BLOOD PRESSURE >= 140 MM HG: ICD-10-PCS | Mod: CPTII,S$GLB,, | Performed by: SPECIALIST

## 2023-02-10 PROCEDURE — 3008F PR BODY MASS INDEX (BMI) DOCUMENTED: ICD-10-PCS | Mod: CPTII,S$GLB,, | Performed by: SPECIALIST

## 2023-02-10 PROCEDURE — 99999 PR PBB SHADOW E&M-EST. PATIENT-LVL IV: CPT | Mod: PBBFAC,,, | Performed by: SPECIALIST

## 2023-02-10 PROCEDURE — 1101F PT FALLS ASSESS-DOCD LE1/YR: CPT | Mod: CPTII,S$GLB,, | Performed by: SPECIALIST

## 2023-02-10 PROCEDURE — 3078F PR MOST RECENT DIASTOLIC BLOOD PRESSURE < 80 MM HG: ICD-10-PCS | Mod: CPTII,S$GLB,, | Performed by: SPECIALIST

## 2023-02-10 PROCEDURE — 1160F RVW MEDS BY RX/DR IN RCRD: CPT | Mod: CPTII,S$GLB,, | Performed by: SPECIALIST

## 2023-02-10 PROCEDURE — 99213 OFFICE O/P EST LOW 20 MIN: CPT | Mod: S$GLB,,, | Performed by: SPECIALIST

## 2023-02-10 PROCEDURE — 1159F MED LIST DOCD IN RCRD: CPT | Mod: CPTII,S$GLB,, | Performed by: SPECIALIST

## 2023-02-10 PROCEDURE — 1160F PR REVIEW ALL MEDS BY PRESCRIBER/CLIN PHARMACIST DOCUMENTED: ICD-10-PCS | Mod: CPTII,S$GLB,, | Performed by: SPECIALIST

## 2023-02-10 PROCEDURE — 1101F PR PT FALLS ASSESS DOC 0-1 FALLS W/OUT INJ PAST YR: ICD-10-PCS | Mod: CPTII,S$GLB,, | Performed by: SPECIALIST

## 2023-02-10 PROCEDURE — 3288F FALL RISK ASSESSMENT DOCD: CPT | Mod: CPTII,S$GLB,, | Performed by: SPECIALIST

## 2023-02-10 PROCEDURE — 3078F DIAST BP <80 MM HG: CPT | Mod: CPTII,S$GLB,, | Performed by: SPECIALIST

## 2023-02-10 PROCEDURE — 1126F PR PAIN SEVERITY QUANTIFIED, NO PAIN PRESENT: ICD-10-PCS | Mod: CPTII,S$GLB,, | Performed by: SPECIALIST

## 2023-02-10 PROCEDURE — 3077F SYST BP >= 140 MM HG: CPT | Mod: CPTII,S$GLB,, | Performed by: SPECIALIST

## 2023-02-10 PROCEDURE — 1159F PR MEDICATION LIST DOCUMENTED IN MEDICAL RECORD: ICD-10-PCS | Mod: CPTII,S$GLB,, | Performed by: SPECIALIST

## 2023-02-10 PROCEDURE — 99213 PR OFFICE/OUTPT VISIT, EST, LEVL III, 20-29 MIN: ICD-10-PCS | Mod: S$GLB,,, | Performed by: SPECIALIST
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
## 2023-02-11 ENCOUNTER — PATIENT MESSAGE (OUTPATIENT)
Dept: FAMILY MEDICINE | Facility: CLINIC | Age: 68
End: 2023-02-11
Payer: COMMERCIAL

## 2023-02-11 PROBLEM — H81.4 VERTIGO OF CENTRAL ORIGIN: Status: ACTIVE | Noted: 2023-02-11

## 2023-02-11 NOTE — PROGRESS NOTES
"Subjective:       Patient ID: Shira Vega is a 67 y.o. female.    Chief Complaint: Follow-up    Follow-up  Pertinent negatives include no abdominal pain, chills, coughing, fatigue or fever.   Pt is here for follow up of htn pt is on b blocker with elevated bp on diuretic not drinking enough water pos light headed   No excessive fatigue    Review of Systems   Constitutional:  Negative for chills, fatigue and fever.   Respiratory:  Negative for cough, chest tightness and shortness of breath.    Gastrointestinal:  Negative for abdominal distention, abdominal pain and blood in stool.     Objective:    BP (!) 141/61 (BP Location: Left arm, Patient Position: Sitting, BP Method: Large (Automatic))   Pulse 66   Resp 16   Ht 5' 3" (1.6 m)   Wt 53.9 kg (118 lb 12.8 oz)   BMI 21.04 kg/m²     Physical Exam  Constitutional:       Appearance: Normal appearance. She is not ill-appearing.   Cardiovascular:      Rate and Rhythm: Normal rate and regular rhythm.      Heart sounds:     No gallop.   Pulmonary:      Effort: Pulmonary effort is normal. No respiratory distress.   Neurological:      General: No focal deficit present.      Mental Status: She is alert and oriented to person, place, and time.      Cranial Nerves: No cranial nerve deficit.      Coordination: Coordination normal.   Psychiatric:         Mood and Affect: Mood normal.         Behavior: Behavior normal.         Thought Content: Thought content normal.         Judgment: Judgment normal.     Bun/creat 16/1.0 in 1/2023  Assessment:       1. Essential hypertension          Plan:     Orders bmp  Cont meds  Increase water intake  Avoid caffeine etoh beverages with caffeine  Rtc 1 month bp log         "This note will not be shared with the patient."     "

## 2023-03-01 ENCOUNTER — OFFICE VISIT (OUTPATIENT)
Dept: FAMILY MEDICINE | Facility: CLINIC | Age: 68
End: 2023-03-01
Attending: FAMILY MEDICINE
Payer: COMMERCIAL

## 2023-03-01 VITALS
HEIGHT: 63 IN | SYSTOLIC BLOOD PRESSURE: 128 MMHG | DIASTOLIC BLOOD PRESSURE: 65 MMHG | WEIGHT: 120.31 LBS | BODY MASS INDEX: 21.32 KG/M2 | HEART RATE: 59 BPM | OXYGEN SATURATION: 100 %

## 2023-03-01 DIAGNOSIS — E03.9 ACQUIRED HYPOTHYROIDISM: ICD-10-CM

## 2023-03-01 DIAGNOSIS — I10 ESSENTIAL HYPERTENSION: Primary | ICD-10-CM

## 2023-03-01 DIAGNOSIS — E78.2 MIXED HYPERLIPIDEMIA: ICD-10-CM

## 2023-03-01 PROCEDURE — 1126F PR PAIN SEVERITY QUANTIFIED, NO PAIN PRESENT: ICD-10-PCS | Mod: CPTII,S$GLB,, | Performed by: FAMILY MEDICINE

## 2023-03-01 PROCEDURE — 1160F PR REVIEW ALL MEDS BY PRESCRIBER/CLIN PHARMACIST DOCUMENTED: ICD-10-PCS | Mod: CPTII,S$GLB,, | Performed by: FAMILY MEDICINE

## 2023-03-01 PROCEDURE — 1101F PR PT FALLS ASSESS DOC 0-1 FALLS W/OUT INJ PAST YR: ICD-10-PCS | Mod: CPTII,S$GLB,, | Performed by: FAMILY MEDICINE

## 2023-03-01 PROCEDURE — 99999 PR PBB SHADOW E&M-EST. PATIENT-LVL V: ICD-10-PCS | Mod: PBBFAC,,, | Performed by: FAMILY MEDICINE

## 2023-03-01 PROCEDURE — 3078F DIAST BP <80 MM HG: CPT | Mod: CPTII,S$GLB,, | Performed by: FAMILY MEDICINE

## 2023-03-01 PROCEDURE — 3288F FALL RISK ASSESSMENT DOCD: CPT | Mod: CPTII,S$GLB,, | Performed by: FAMILY MEDICINE

## 2023-03-01 PROCEDURE — 1101F PT FALLS ASSESS-DOCD LE1/YR: CPT | Mod: CPTII,S$GLB,, | Performed by: FAMILY MEDICINE

## 2023-03-01 PROCEDURE — 3008F PR BODY MASS INDEX (BMI) DOCUMENTED: ICD-10-PCS | Mod: CPTII,S$GLB,, | Performed by: FAMILY MEDICINE

## 2023-03-01 PROCEDURE — 99214 PR OFFICE/OUTPT VISIT, EST, LEVL IV, 30-39 MIN: ICD-10-PCS | Mod: S$GLB,,, | Performed by: FAMILY MEDICINE

## 2023-03-01 PROCEDURE — 1126F AMNT PAIN NOTED NONE PRSNT: CPT | Mod: CPTII,S$GLB,, | Performed by: FAMILY MEDICINE

## 2023-03-01 PROCEDURE — 3008F BODY MASS INDEX DOCD: CPT | Mod: CPTII,S$GLB,, | Performed by: FAMILY MEDICINE

## 2023-03-01 PROCEDURE — 1160F RVW MEDS BY RX/DR IN RCRD: CPT | Mod: CPTII,S$GLB,, | Performed by: FAMILY MEDICINE

## 2023-03-01 PROCEDURE — 3074F PR MOST RECENT SYSTOLIC BLOOD PRESSURE < 130 MM HG: ICD-10-PCS | Mod: CPTII,S$GLB,, | Performed by: FAMILY MEDICINE

## 2023-03-01 PROCEDURE — 1159F MED LIST DOCD IN RCRD: CPT | Mod: CPTII,S$GLB,, | Performed by: FAMILY MEDICINE

## 2023-03-01 PROCEDURE — 3078F PR MOST RECENT DIASTOLIC BLOOD PRESSURE < 80 MM HG: ICD-10-PCS | Mod: CPTII,S$GLB,, | Performed by: FAMILY MEDICINE

## 2023-03-01 PROCEDURE — 3074F SYST BP LT 130 MM HG: CPT | Mod: CPTII,S$GLB,, | Performed by: FAMILY MEDICINE

## 2023-03-01 PROCEDURE — 1159F PR MEDICATION LIST DOCUMENTED IN MEDICAL RECORD: ICD-10-PCS | Mod: CPTII,S$GLB,, | Performed by: FAMILY MEDICINE

## 2023-03-01 PROCEDURE — 99214 OFFICE O/P EST MOD 30 MIN: CPT | Mod: S$GLB,,, | Performed by: FAMILY MEDICINE

## 2023-03-01 PROCEDURE — 99999 PR PBB SHADOW E&M-EST. PATIENT-LVL V: CPT | Mod: PBBFAC,,, | Performed by: FAMILY MEDICINE

## 2023-03-01 PROCEDURE — 3288F PR FALLS RISK ASSESSMENT DOCUMENTED: ICD-10-PCS | Mod: CPTII,S$GLB,, | Performed by: FAMILY MEDICINE

## 2023-03-01 NOTE — PROGRESS NOTES
"Subjective:       Patient ID: Shira Vega is a 67 y.o. female.    Chief Complaint: Hypertension    HPI  Pt is here for follow up of htn bp fine on b blocker no excessive fatigue no sob/cp not on diuretic combo made her light headed   Pt has hypercholesterolemia on statin no muscle aches   Pt has hypothyroid no unexplained weight changes on synthroid  Review of Systems   Constitutional:  Negative for chills, fatigue and fever.   Respiratory:  Negative for cough, chest tightness and shortness of breath.    Cardiovascular:  Negative for chest pain and palpitations.   Gastrointestinal:  Negative for abdominal distention, abdominal pain and blood in stool.   Endocrine: Negative for cold intolerance and heat intolerance.     Objective:    /65   Pulse (!) 59   Ht 5' 3" (1.6 m)   Wt 54.6 kg (120 lb 4.8 oz)   SpO2 100%   BMI 21.31 kg/m²     Physical Exam  Constitutional:       Appearance: Normal appearance. She is not ill-appearing.   Cardiovascular:      Rate and Rhythm: Normal rate and regular rhythm.      Heart sounds:     No gallop.   Pulmonary:      Effort: Pulmonary effort is normal. No respiratory distress.      Breath sounds: No rales.   Musculoskeletal:      Cervical back: Normal range of motion and neck supple.   Neurological:      General: No focal deficit present.      Mental Status: She is alert and oriented to person, place, and time.      Cranial Nerves: No cranial nerve deficit.      Coordination: Coordination normal.   Psychiatric:         Mood and Affect: Mood normal.         Behavior: Behavior normal.         Thought Content: Thought content normal.         Judgment: Judgment normal.     Bun/creat 29/1.6 in 2/2023  Assessment:       1. Essential hypertension    2. Mixed hyperlipidemia        3. hypothyroid  Plan:     Orders cmp   Cont meds  Avoid diuretics  Low fat low salt diet  Graded exercise  Increase water intake  Rtc 3-6 months       "This note will not be shared with the patient." "
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
## 2023-04-03 ENCOUNTER — OFFICE VISIT (OUTPATIENT)
Dept: OTOLARYNGOLOGY | Facility: CLINIC | Age: 68
End: 2023-04-03
Payer: COMMERCIAL

## 2023-04-03 VITALS
HEART RATE: 59 BPM | TEMPERATURE: 98 F | SYSTOLIC BLOOD PRESSURE: 185 MMHG | WEIGHT: 119.94 LBS | DIASTOLIC BLOOD PRESSURE: 85 MMHG | BODY MASS INDEX: 21.24 KG/M2

## 2023-04-03 DIAGNOSIS — K21.9 LARYNGOPHARYNGEAL REFLUX (LPR): Primary | ICD-10-CM

## 2023-04-03 DIAGNOSIS — J30.9 ALLERGIC RHINITIS, UNSPECIFIED SEASONALITY, UNSPECIFIED TRIGGER: ICD-10-CM

## 2023-04-03 DIAGNOSIS — R09.89 CHRONIC THROAT CLEARING: ICD-10-CM

## 2023-04-03 DIAGNOSIS — Z74.09 IMPAIRED FUNCTIONAL MOBILITY, BALANCE, AND ENDURANCE: ICD-10-CM

## 2023-04-03 DIAGNOSIS — J34.2 NASAL SEPTAL DEVIATION: ICD-10-CM

## 2023-04-03 DIAGNOSIS — H81.4 VERTIGO OF CENTRAL ORIGIN: ICD-10-CM

## 2023-04-03 DIAGNOSIS — R49.0 HOARSENESS: ICD-10-CM

## 2023-04-03 PROCEDURE — 31575 LARYNGOSCOPY: ICD-10-PCS | Mod: S$GLB,,, | Performed by: SPECIALIST

## 2023-04-03 PROCEDURE — 3288F PR FALLS RISK ASSESSMENT DOCUMENTED: ICD-10-PCS | Mod: CPTII,S$GLB,, | Performed by: SPECIALIST

## 2023-04-03 PROCEDURE — 99999 PR PBB SHADOW E&M-EST. PATIENT-LVL IV: CPT | Mod: PBBFAC,,, | Performed by: SPECIALIST

## 2023-04-03 PROCEDURE — 1160F PR REVIEW ALL MEDS BY PRESCRIBER/CLIN PHARMACIST DOCUMENTED: ICD-10-PCS | Mod: CPTII,S$GLB,, | Performed by: SPECIALIST

## 2023-04-03 PROCEDURE — 3079F DIAST BP 80-89 MM HG: CPT | Mod: CPTII,S$GLB,, | Performed by: SPECIALIST

## 2023-04-03 PROCEDURE — 3077F PR MOST RECENT SYSTOLIC BLOOD PRESSURE >= 140 MM HG: ICD-10-PCS | Mod: CPTII,S$GLB,, | Performed by: SPECIALIST

## 2023-04-03 PROCEDURE — 31575 DIAGNOSTIC LARYNGOSCOPY: CPT | Mod: S$GLB,,, | Performed by: SPECIALIST

## 2023-04-03 PROCEDURE — 3079F PR MOST RECENT DIASTOLIC BLOOD PRESSURE 80-89 MM HG: ICD-10-PCS | Mod: CPTII,S$GLB,, | Performed by: SPECIALIST

## 2023-04-03 PROCEDURE — 3008F BODY MASS INDEX DOCD: CPT | Mod: CPTII,S$GLB,, | Performed by: SPECIALIST

## 2023-04-03 PROCEDURE — 1159F MED LIST DOCD IN RCRD: CPT | Mod: CPTII,S$GLB,, | Performed by: SPECIALIST

## 2023-04-03 PROCEDURE — 99214 OFFICE O/P EST MOD 30 MIN: CPT | Mod: 25,S$GLB,, | Performed by: SPECIALIST

## 2023-04-03 PROCEDURE — 99214 PR OFFICE/OUTPT VISIT, EST, LEVL IV, 30-39 MIN: ICD-10-PCS | Mod: 25,S$GLB,, | Performed by: SPECIALIST

## 2023-04-03 PROCEDURE — 3008F PR BODY MASS INDEX (BMI) DOCUMENTED: ICD-10-PCS | Mod: CPTII,S$GLB,, | Performed by: SPECIALIST

## 2023-04-03 PROCEDURE — 1160F RVW MEDS BY RX/DR IN RCRD: CPT | Mod: CPTII,S$GLB,, | Performed by: SPECIALIST

## 2023-04-03 PROCEDURE — 99999 PR PBB SHADOW E&M-EST. PATIENT-LVL IV: ICD-10-PCS | Mod: PBBFAC,,, | Performed by: SPECIALIST

## 2023-04-03 PROCEDURE — 1159F PR MEDICATION LIST DOCUMENTED IN MEDICAL RECORD: ICD-10-PCS | Mod: CPTII,S$GLB,, | Performed by: SPECIALIST

## 2023-04-03 PROCEDURE — 3288F FALL RISK ASSESSMENT DOCD: CPT | Mod: CPTII,S$GLB,, | Performed by: SPECIALIST

## 2023-04-03 PROCEDURE — 3077F SYST BP >= 140 MM HG: CPT | Mod: CPTII,S$GLB,, | Performed by: SPECIALIST

## 2023-04-03 PROCEDURE — 1101F PT FALLS ASSESS-DOCD LE1/YR: CPT | Mod: CPTII,S$GLB,, | Performed by: SPECIALIST

## 2023-04-03 PROCEDURE — 1101F PR PT FALLS ASSESS DOC 0-1 FALLS W/OUT INJ PAST YR: ICD-10-PCS | Mod: CPTII,S$GLB,, | Performed by: SPECIALIST

## 2023-04-04 NOTE — PROCEDURES
"Laryngoscopy    Date/Time: 4/3/2023 9:40 AM  Performed by: ДМИТРИЙ Macdonald MD  Authorized by: ДМИТРИЙ Macdonald MD     Time out: Immediately prior to procedure a "time out" was called to verify the correct patient, procedure, equipment, support staff and site/side marked as required.    Consent Done?:  Yes (Verbal)  Anesthesia:     Local anesthetic:  4% Xylocaine spray with Juan-Synephrine    Patient tolerance:  Patient tolerated the procedure well with no immediate complications    Decongestion performed?: Yes    Laryngoscopy:     Areas examined:  Larynx, hypopharynx, oropharynx, nasopharynx, nasal cavities and vocal cords    Laryngoscope size:  4 mm (Flexible video nasolaryngoscopy)  Nose External:      No external nasal deformity  Nose Intranasal:      Mucosa no polyps     Mucosa ulcers not present     No mucosa lesions present (clear mucus in the nasal passages bilaterally)     Septum gross deformity (septum deviated high in the nose to the right and on the floor the nose to the left)     Enlarged turbinates (inferior turbinates enlarged bilaterally)  Nasopharynx:      No mucosa lesions     Adenoids not present     Posterior choanae patent     Eustachian tube patent  Larynx/hypopharynx:      No epiglottis lesions     No epiglottis edema     No AE folds lesions     No vocal cord polyps     Equal and normal bilateral (vocal cords move symmetrically, no mucosal lesions noted)     No hypopharynx lesions     No piriform sinus pooling     No piriform sinus lesions     Post cricoid edema (minimal, no significant change from last endoscopy in July 2022)     Post cricoid erythema (minimal, no significant change from last endoscopy in July of 2022)     Flexible video nasolaryngoscopy-nasal septal deviation and nasal changes of allergic rhinitis; laryngeal changes consistent with laryngopharyngeal reflux that is stable while taking H2 agonist therapy  "

## 2023-05-01 NOTE — TELEPHONE ENCOUNTER
No care due was identified.  [facility] Embedded Care Due Messages. Reference number: [id number].   5/01/2023 4:37:09 PM CDT

## 2023-05-02 RX ORDER — LEVOTHYROXINE SODIUM 50 UG/1
TABLET ORAL
Qty: 90 TABLET | Refills: 2 | Status: SHIPPED | OUTPATIENT
Start: 2023-05-02 | End: 2023-08-10 | Stop reason: SDUPTHER

## 2023-05-02 NOTE — TELEPHONE ENCOUNTER
Refill Decision Note   Shira Vega  is requesting a refill authorization.  Brief Assessment and Rationale for Refill:  Approve     Medication Therapy Plan:       Medication Reconciliation Completed: No   Comments:     No Care Gaps recommended.     Note composed:8:56 AM 05/02/2023

## 2023-05-30 ENCOUNTER — TELEPHONE (OUTPATIENT)
Dept: FAMILY MEDICINE | Facility: CLINIC | Age: 68
End: 2023-05-30
Payer: COMMERCIAL

## 2023-05-30 NOTE — TELEPHONE ENCOUNTER
----- Message from Giovana Carrasquillo sent at 5/29/2023  5:27 PM CDT -----  Type: Appointment Request     Name of Caller: Pt   When is the first available appointment? Do not have access  Reason for Visit: follow up   Best Call Back Number: [phone]  Additional Information: Patient was schedule on 6/1 but will be out of town would like to know if possible can be seen on the 12th

## 2023-05-30 NOTE — TELEPHONE ENCOUNTER
Please inform patient that Dr. Price is not available on 6/12.  She is welcome to see Brionna if she is available.

## 2023-08-10 ENCOUNTER — OFFICE VISIT (OUTPATIENT)
Dept: FAMILY MEDICINE | Facility: CLINIC | Age: 68
End: 2023-08-10
Attending: FAMILY MEDICINE
Payer: COMMERCIAL

## 2023-08-10 ENCOUNTER — LAB VISIT (OUTPATIENT)
Dept: LAB | Facility: HOSPITAL | Age: 68
End: 2023-08-10
Attending: FAMILY MEDICINE
Payer: COMMERCIAL

## 2023-08-10 VITALS
OXYGEN SATURATION: 99 % | HEART RATE: 63 BPM | SYSTOLIC BLOOD PRESSURE: 128 MMHG | WEIGHT: 112.5 LBS | BODY MASS INDEX: 19.93 KG/M2 | DIASTOLIC BLOOD PRESSURE: 74 MMHG

## 2023-08-10 DIAGNOSIS — E03.9 ACQUIRED HYPOTHYROIDISM: ICD-10-CM

## 2023-08-10 DIAGNOSIS — I10 ESSENTIAL HYPERTENSION: Primary | ICD-10-CM

## 2023-08-10 DIAGNOSIS — R63.4 WEIGHT LOSS, NON-INTENTIONAL: ICD-10-CM

## 2023-08-10 DIAGNOSIS — E78.2 MIXED HYPERLIPIDEMIA: ICD-10-CM

## 2023-08-10 DIAGNOSIS — I10 ESSENTIAL HYPERTENSION: ICD-10-CM

## 2023-08-10 LAB
ALBUMIN SERPL BCP-MCNC: 4.4 G/DL (ref 3.5–5.2)
ALP SERPL-CCNC: 60 U/L (ref 55–135)
ALT SERPL W/O P-5'-P-CCNC: 9 U/L (ref 10–44)
ANION GAP SERPL CALC-SCNC: 13 MMOL/L (ref 8–16)
AST SERPL-CCNC: 19 U/L (ref 10–40)
BILIRUB SERPL-MCNC: 0.6 MG/DL (ref 0.1–1)
BUN SERPL-MCNC: 18 MG/DL (ref 8–23)
CALCIUM SERPL-MCNC: 11 MG/DL (ref 8.7–10.5)
CHLORIDE SERPL-SCNC: 109 MMOL/L (ref 95–110)
CO2 SERPL-SCNC: 22 MMOL/L (ref 23–29)
CREAT SERPL-MCNC: 1.2 MG/DL (ref 0.5–1.4)
EST. GFR  (NO RACE VARIABLE): 49 ML/MIN/1.73 M^2
GLUCOSE SERPL-MCNC: 92 MG/DL (ref 70–110)
POTASSIUM SERPL-SCNC: 4.4 MMOL/L (ref 3.5–5.1)
PROT SERPL-MCNC: 7.7 G/DL (ref 6–8.4)
SODIUM SERPL-SCNC: 144 MMOL/L (ref 136–145)
TSH SERPL DL<=0.005 MIU/L-ACNC: 0.35 UIU/ML (ref 0.4–4)

## 2023-08-10 PROCEDURE — 1159F PR MEDICATION LIST DOCUMENTED IN MEDICAL RECORD: ICD-10-PCS | Mod: CPTII,S$GLB,, | Performed by: FAMILY MEDICINE

## 2023-08-10 PROCEDURE — 99214 OFFICE O/P EST MOD 30 MIN: CPT | Mod: S$GLB,,, | Performed by: FAMILY MEDICINE

## 2023-08-10 PROCEDURE — 80053 COMPREHEN METABOLIC PANEL: CPT | Performed by: FAMILY MEDICINE

## 2023-08-10 PROCEDURE — 99999 PR PBB SHADOW E&M-EST. PATIENT-LVL IV: CPT | Mod: PBBFAC,,, | Performed by: FAMILY MEDICINE

## 2023-08-10 PROCEDURE — 1160F PR REVIEW ALL MEDS BY PRESCRIBER/CLIN PHARMACIST DOCUMENTED: ICD-10-PCS | Mod: CPTII,S$GLB,, | Performed by: FAMILY MEDICINE

## 2023-08-10 PROCEDURE — 3288F FALL RISK ASSESSMENT DOCD: CPT | Mod: CPTII,S$GLB,, | Performed by: FAMILY MEDICINE

## 2023-08-10 PROCEDURE — 3288F PR FALLS RISK ASSESSMENT DOCUMENTED: ICD-10-PCS | Mod: CPTII,S$GLB,, | Performed by: FAMILY MEDICINE

## 2023-08-10 PROCEDURE — 1101F PR PT FALLS ASSESS DOC 0-1 FALLS W/OUT INJ PAST YR: ICD-10-PCS | Mod: CPTII,S$GLB,, | Performed by: FAMILY MEDICINE

## 2023-08-10 PROCEDURE — 3008F BODY MASS INDEX DOCD: CPT | Mod: CPTII,S$GLB,, | Performed by: FAMILY MEDICINE

## 2023-08-10 PROCEDURE — 1159F MED LIST DOCD IN RCRD: CPT | Mod: CPTII,S$GLB,, | Performed by: FAMILY MEDICINE

## 2023-08-10 PROCEDURE — 3074F PR MOST RECENT SYSTOLIC BLOOD PRESSURE < 130 MM HG: ICD-10-PCS | Mod: CPTII,S$GLB,, | Performed by: FAMILY MEDICINE

## 2023-08-10 PROCEDURE — 1101F PT FALLS ASSESS-DOCD LE1/YR: CPT | Mod: CPTII,S$GLB,, | Performed by: FAMILY MEDICINE

## 2023-08-10 PROCEDURE — 36415 COLL VENOUS BLD VENIPUNCTURE: CPT | Mod: PO | Performed by: FAMILY MEDICINE

## 2023-08-10 PROCEDURE — 3078F PR MOST RECENT DIASTOLIC BLOOD PRESSURE < 80 MM HG: ICD-10-PCS | Mod: CPTII,S$GLB,, | Performed by: FAMILY MEDICINE

## 2023-08-10 PROCEDURE — 1160F RVW MEDS BY RX/DR IN RCRD: CPT | Mod: CPTII,S$GLB,, | Performed by: FAMILY MEDICINE

## 2023-08-10 PROCEDURE — 99214 PR OFFICE/OUTPT VISIT, EST, LEVL IV, 30-39 MIN: ICD-10-PCS | Mod: S$GLB,,, | Performed by: FAMILY MEDICINE

## 2023-08-10 PROCEDURE — 1126F PR PAIN SEVERITY QUANTIFIED, NO PAIN PRESENT: ICD-10-PCS | Mod: CPTII,S$GLB,, | Performed by: FAMILY MEDICINE

## 2023-08-10 PROCEDURE — 84439 ASSAY OF FREE THYROXINE: CPT | Performed by: FAMILY MEDICINE

## 2023-08-10 PROCEDURE — 3074F SYST BP LT 130 MM HG: CPT | Mod: CPTII,S$GLB,, | Performed by: FAMILY MEDICINE

## 2023-08-10 PROCEDURE — 99999 PR PBB SHADOW E&M-EST. PATIENT-LVL IV: ICD-10-PCS | Mod: PBBFAC,,, | Performed by: FAMILY MEDICINE

## 2023-08-10 PROCEDURE — 3078F DIAST BP <80 MM HG: CPT | Mod: CPTII,S$GLB,, | Performed by: FAMILY MEDICINE

## 2023-08-10 PROCEDURE — 3008F PR BODY MASS INDEX (BMI) DOCUMENTED: ICD-10-PCS | Mod: CPTII,S$GLB,, | Performed by: FAMILY MEDICINE

## 2023-08-10 PROCEDURE — 84443 ASSAY THYROID STIM HORMONE: CPT | Performed by: FAMILY MEDICINE

## 2023-08-10 PROCEDURE — 1126F AMNT PAIN NOTED NONE PRSNT: CPT | Mod: CPTII,S$GLB,, | Performed by: FAMILY MEDICINE

## 2023-08-10 RX ORDER — LEVOTHYROXINE SODIUM 50 UG/1
50 TABLET ORAL DAILY
Qty: 90 TABLET | Refills: 3 | Status: SHIPPED | OUTPATIENT
Start: 2023-08-10 | End: 2024-01-29

## 2023-08-10 RX ORDER — PRAVASTATIN SODIUM 20 MG/1
20 TABLET ORAL DAILY
Qty: 90 TABLET | Refills: 3 | Status: SHIPPED | OUTPATIENT
Start: 2023-08-10 | End: 2023-10-17

## 2023-08-10 RX ORDER — BISOPROLOL FUMARATE 10 MG/1
10 TABLET, FILM COATED ORAL DAILY
Qty: 90 TABLET | Refills: 3 | Status: SHIPPED | OUTPATIENT
Start: 2023-08-10 | End: 2023-09-18

## 2023-08-10 NOTE — PROGRESS NOTES
"Subjective:       Patient ID: Shira Vega is a 67 y.o. female.    Chief Complaint: Hypertension    Hypertension  Pertinent negatives include no chest pain, palpitations or shortness of breath.     Pt is here for follow up of htn stable on b blocker bp fine at home no sob/cp  Pt has hypothyroid she is losing weight but admits she does not have an appetite she will put in effort   Pt has hypercholesterolemia on statin no muscle aches   Review of Systems   Constitutional:  Positive for unexpected weight change. Negative for chills, fatigue and fever.   Respiratory:  Negative for cough, chest tightness and shortness of breath.    Cardiovascular:  Negative for chest pain and palpitations.   Gastrointestinal:  Negative for abdominal distention and abdominal pain.   Endocrine: Negative for cold intolerance and heat intolerance.       Objective:    /74   Pulse 63   Wt 51 kg (112 lb 8 oz)   SpO2 99%   BMI 19.93 kg/m²     Physical Exam  Constitutional:       Appearance: Normal appearance. She is not ill-appearing.   Eyes:      Extraocular Movements: Extraocular movements intact.   Cardiovascular:      Rate and Rhythm: Normal rate and regular rhythm.      Heart sounds:      No gallop.   Pulmonary:      Effort: Pulmonary effort is normal. No respiratory distress.   Neurological:      General: No focal deficit present.      Mental Status: She is alert and oriented to person, place, and time.      Cranial Nerves: No cranial nerve deficit.   Psychiatric:         Mood and Affect: Mood normal.         Behavior: Behavior normal.         Thought Content: Thought content normal.         Judgment: Judgment normal.       Tsh 0.556  Assessment:       1. Essential hypertension    2. Mixed hyperlipidemia    3. Acquired hypothyroidism        Plan:        Orders cmp tsh  Cont meds  Do not skip meals  Avoid caffeine and etoh  Graded exercise  Rtc 3-6 months    "This note will not be shared with the patient."     "

## 2023-08-11 LAB — T4 FREE SERPL-MCNC: 0.82 NG/DL (ref 0.71–1.51)

## 2023-08-15 ENCOUNTER — PATIENT MESSAGE (OUTPATIENT)
Dept: FAMILY MEDICINE | Facility: CLINIC | Age: 68
End: 2023-08-15
Payer: COMMERCIAL

## 2023-08-15 DIAGNOSIS — E83.52 HYPERCALCEMIA: Primary | ICD-10-CM

## 2023-09-17 NOTE — PROGRESS NOTES
Subjective:       Patient ID: Shira Vega is a 68 y.o. female.    Chief Complaint: No chief complaint on file.      The patient is coming in for follow-up visit.  There are multiple issues to discuss:  1. Laryngal pharyngeal reflux:  Swallowing/throat symptoms are remaining unchanged.  She is taking famotidine twice daily.  2. Allergic rhinitis:  Nasal secretions are clear.  She uses Flonase daily and supplements with loratadine when needed.  3. Chronic imbalance/central vestibular disorder:  The patient continues having chronic imbalance.  On September 14, 2023, she fell at home when getting out of bed and struck the jaw and face on the right side on the floor when she fell.  There was no loss of consciousness.  She did go to the emergency room where she was evaluated.  She was sent home.  She is now using a walker She continues having private vestibular rehab therapy.    4. History traumatic brain injury:  The patient continues to see a neurologist.  There has been no significant changes in this area.  Things have not worsened with her most recent fall.          Review of Systems     Constitutional: Positive for fatigue.  Negative for appetite change, chills, fever and unexpected weight loss.      HENT: Positive for facial swelling, postnasal drip, runny nose, sinus pressure, sore throat and stuffy nose.  Negative for ear discharge, ear infection, ear pain, hearing loss, mouth sores, nosebleeds, ringing in the ears, sinus infection, tonsil infection, dental problems, trouble swallowing and voice change.      Eyes:  Negative for change in eyesight, eye drainage, eye itching and photophobia.     Respiratory:  Positive for cough. Negative for shortness of breath, sleep apnea, snoring and wheezing.  Choking episodes    Cardiovascular:  Negative for chest pain, foot swelling, irregular heartbeat and swollen veins.     Gastrointestinal:  Negative for abdominal pain, acid reflux, constipation, diarrhea, heartburn and  vomiting.     Genitourinary: Negative for difficulty urinating, sexual problems and frequent urination.     Musc: Positive for aching muscles. Negative for aching joints, back pain and neck pain.     Skin: Negative for rash.     Allergy: Positive for seasonal allergies. Negative for food allergies.     Endocrine: Negative for cold intolerance and heat intolerance.      Neurological: Positive for dizziness, headaches and light-headedness. Negative for seizures and tremors.  Weakness    Hematologic: Negative for bruises/bleeds easily and swollen glands.      Psychiatric: Positive for decreased concentration. Negative for depression, nervous/anxious and sleep disturbance.                Objective:      Physical Exam  Vitals and nursing note reviewed.   Constitutional:       General: She is awake.      Appearance: Normal appearance. She is well-developed, well-groomed and normal weight. She is not ill-appearing (facial bruising with recent fall at home).   HENT:      Head: Normocephalic.      Jaw: There is normal jaw occlusion. Tenderness and swelling (swellingand ecchymosis as indicated in the pictures with dark blue/purple discoloration primarily with some peripheral copper colored discoloration) present. No trismus, pain on movement or malocclusion.      Salivary Glands: Right salivary gland is not diffusely enlarged or tender. Left salivary gland is not diffusely enlarged or tender.        Comments: Facial bones-no palpable fracture, mild tenderness of the right infraorbital rim in in the ramus of the mandible on the right, no evidence of facial bone fracture or mandibular fracture     Right Ear: Hearing, ear canal and external ear normal. Tympanic membrane is retracted.      Left Ear: Hearing, ear canal and external ear normal. Tympanic membrane is retracted.      Nose: Septal deviation (to the left), mucosal edema (cyanotic, boggy inferior turbinates bilaterally) and rhinorrhea (clear mucus bilaterally) present.  No nasal deformity. Rhinorrhea is clear.      Right Turbinates: Enlarged and pale.      Left Turbinates: Enlarged and pale.      Mouth/Throat:      Lips: No lesions.      Mouth: Mucous membranes are moist. No oral lesions.      Dentition: No gum lesions.      Tongue: No lesions.      Palate: No mass and lesions.      Pharynx: Oropharynx is clear. Uvula midline.      Comments: Voice-minimally weak with poor projection, no raspiness  Eyes:      General: Lids are normal.         Right eye: No discharge.         Left eye: No discharge.      Conjunctiva/sclera:      Right eye: Right conjunctiva is injected. No exudate.     Left eye: Left conjunctiva is injected. No exudate.     Pupils: Pupils are equal, round, and reactive to light.   Neck:      Thyroid: No thyroid mass or thyromegaly.      Trachea: Trachea normal. No tracheal deviation.   Cardiovascular:      Rate and Rhythm: Normal rate and regular rhythm.      Pulses: Normal pulses.      Heart sounds: Normal heart sounds.   Pulmonary:      Effort: Pulmonary effort is normal.      Breath sounds: Normal breath sounds. No stridor. No decreased breath sounds, wheezing, rhonchi or rales.   Abdominal:      General: Bowel sounds are normal.      Palpations: Abdomen is soft.      Tenderness: There is no abdominal tenderness.   Musculoskeletal:         General: Normal range of motion.      Cervical back: Normal range of motion. No muscular tenderness.   Lymphadenopathy:      Head:      Right side of head: No submental, submandibular, preauricular, posterior auricular or occipital adenopathy.      Left side of head: No submental, submandibular, preauricular, posterior auricular or occipital adenopathy.      Cervical: No cervical adenopathy.   Skin:     General: Skin is warm and dry.      Findings: No bruising (fine white here in the perioral area, chin and cheeks bilaterally), petechiae or rash.      Nails: There is no clubbing.   Neurological:      Mental Status: She is alert  and oriented to person, place, and time.      Cranial Nerves: No cranial nerve deficit.      Sensory: No sensory deficit.      Gait: Gait normal.      Comments: Neurologic-no nystagmus, wide-based gait, tandem gait not attempted, Romberg unsteady, tandem Romberg falls to the left   Psychiatric:         Speech: Speech normal.         Behavior: Behavior normal. Behavior is cooperative.         Thought Content: Thought content normal.         Judgment: Judgment normal.         Facial pictures:                  Assessment:       1. Contusion of face, initial encounter    2. Contusion of jaw, initial encounter    3. Traumatic ecchymosis of neck, initial encounter    4. Laryngopharyngeal reflux (LPR)    5. Impaired functional mobility, balance, and endurance    6. Vertigo of central origin    7. Allergic rhinitis, unspecified seasonality, unspecified trigger    8. History of intracranial hemorrhage        Plan:       I will have the patient continue with her present drug regimen.  Regarding the soft tissue injuries and bruising I am advising that she use warm moist compresses for 30 minutes 4 times daily.  She should use topical and oral arnica [place].  She should avoid sun exposure until all bruising has resolved.  I will recheck her in 2 weeks.                DISCLAIMER: This note was prepared with [employer] voice recognition transcription software. Garbled syntax, mangled pronouns, and other bizarre constructions may be attributed to that software system. While efforts were made to correct any mistakes made by this voice recognition program, some errors and/or omissions may remain in the note that were missed when the note was originally created.

## 2023-09-18 ENCOUNTER — OFFICE VISIT (OUTPATIENT)
Dept: OTOLARYNGOLOGY | Facility: CLINIC | Age: 68
End: 2023-09-18
Payer: COMMERCIAL

## 2023-09-18 VITALS
HEART RATE: 81 BPM | HEIGHT: 63 IN | DIASTOLIC BLOOD PRESSURE: 64 MMHG | BODY MASS INDEX: 20.1 KG/M2 | WEIGHT: 113.44 LBS | SYSTOLIC BLOOD PRESSURE: 135 MMHG

## 2023-09-18 DIAGNOSIS — S00.83XA CONTUSION OF FACE, INITIAL ENCOUNTER: Primary | ICD-10-CM

## 2023-09-18 DIAGNOSIS — Z74.09 IMPAIRED FUNCTIONAL MOBILITY, BALANCE, AND ENDURANCE: ICD-10-CM

## 2023-09-18 DIAGNOSIS — Z86.79 HISTORY OF INTRACRANIAL HEMORRHAGE: ICD-10-CM

## 2023-09-18 DIAGNOSIS — H81.4 VERTIGO OF CENTRAL ORIGIN: ICD-10-CM

## 2023-09-18 DIAGNOSIS — S00.83XA CONTUSION OF JAW, INITIAL ENCOUNTER: ICD-10-CM

## 2023-09-18 DIAGNOSIS — S10.93XA TRAUMATIC ECCHYMOSIS OF NECK, INITIAL ENCOUNTER: ICD-10-CM

## 2023-09-18 DIAGNOSIS — K21.9 LARYNGOPHARYNGEAL REFLUX (LPR): ICD-10-CM

## 2023-09-18 DIAGNOSIS — J30.9 ALLERGIC RHINITIS, UNSPECIFIED SEASONALITY, UNSPECIFIED TRIGGER: ICD-10-CM

## 2023-09-18 PROCEDURE — 1160F PR REVIEW ALL MEDS BY PRESCRIBER/CLIN PHARMACIST DOCUMENTED: ICD-10-PCS | Mod: CPTII,S$GLB,, | Performed by: SPECIALIST

## 2023-09-18 PROCEDURE — 3008F BODY MASS INDEX DOCD: CPT | Mod: CPTII,S$GLB,, | Performed by: SPECIALIST

## 2023-09-18 PROCEDURE — 3075F SYST BP GE 130 - 139MM HG: CPT | Mod: CPTII,S$GLB,, | Performed by: SPECIALIST

## 2023-09-18 PROCEDURE — 1101F PT FALLS ASSESS-DOCD LE1/YR: CPT | Mod: CPTII,S$GLB,, | Performed by: SPECIALIST

## 2023-09-18 PROCEDURE — 3288F PR FALLS RISK ASSESSMENT DOCUMENTED: ICD-10-PCS | Mod: CPTII,S$GLB,, | Performed by: SPECIALIST

## 2023-09-18 PROCEDURE — 3288F FALL RISK ASSESSMENT DOCD: CPT | Mod: CPTII,S$GLB,, | Performed by: SPECIALIST

## 2023-09-18 PROCEDURE — 3078F PR MOST RECENT DIASTOLIC BLOOD PRESSURE < 80 MM HG: ICD-10-PCS | Mod: CPTII,S$GLB,, | Performed by: SPECIALIST

## 2023-09-18 PROCEDURE — 1160F RVW MEDS BY RX/DR IN RCRD: CPT | Mod: CPTII,S$GLB,, | Performed by: SPECIALIST

## 2023-09-18 PROCEDURE — 3075F PR MOST RECENT SYSTOLIC BLOOD PRESS GE 130-139MM HG: ICD-10-PCS | Mod: CPTII,S$GLB,, | Performed by: SPECIALIST

## 2023-09-18 PROCEDURE — 1159F MED LIST DOCD IN RCRD: CPT | Mod: CPTII,S$GLB,, | Performed by: SPECIALIST

## 2023-09-18 PROCEDURE — 1101F PR PT FALLS ASSESS DOC 0-1 FALLS W/OUT INJ PAST YR: ICD-10-PCS | Mod: CPTII,S$GLB,, | Performed by: SPECIALIST

## 2023-09-18 PROCEDURE — 1126F AMNT PAIN NOTED NONE PRSNT: CPT | Mod: CPTII,S$GLB,, | Performed by: SPECIALIST

## 2023-09-18 PROCEDURE — 3008F PR BODY MASS INDEX (BMI) DOCUMENTED: ICD-10-PCS | Mod: CPTII,S$GLB,, | Performed by: SPECIALIST

## 2023-09-18 PROCEDURE — 1159F PR MEDICATION LIST DOCUMENTED IN MEDICAL RECORD: ICD-10-PCS | Mod: CPTII,S$GLB,, | Performed by: SPECIALIST

## 2023-09-18 PROCEDURE — 99999 PR PBB SHADOW E&M-EST. PATIENT-LVL IV: ICD-10-PCS | Mod: PBBFAC,,, | Performed by: SPECIALIST

## 2023-09-18 PROCEDURE — 3078F DIAST BP <80 MM HG: CPT | Mod: CPTII,S$GLB,, | Performed by: SPECIALIST

## 2023-09-18 PROCEDURE — 99999 PR PBB SHADOW E&M-EST. PATIENT-LVL IV: CPT | Mod: PBBFAC,,, | Performed by: SPECIALIST

## 2023-09-18 PROCEDURE — 99214 PR OFFICE/OUTPT VISIT, EST, LEVL IV, 30-39 MIN: ICD-10-PCS | Mod: S$GLB,,, | Performed by: SPECIALIST

## 2023-09-18 PROCEDURE — 99214 OFFICE O/P EST MOD 30 MIN: CPT | Mod: S$GLB,,, | Performed by: SPECIALIST

## 2023-09-18 PROCEDURE — 1126F PR PAIN SEVERITY QUANTIFIED, NO PAIN PRESENT: ICD-10-PCS | Mod: CPTII,S$GLB,, | Performed by: SPECIALIST

## 2023-09-25 ENCOUNTER — TELEPHONE (OUTPATIENT)
Dept: OTOLARYNGOLOGY | Facility: CLINIC | Age: 68
End: 2023-09-25
Payer: MEDICARE

## 2023-09-27 ENCOUNTER — TELEPHONE (OUTPATIENT)
Dept: OTOLARYNGOLOGY | Facility: CLINIC | Age: 68
End: 2023-09-27
Payer: MEDICARE

## 2023-10-02 ENCOUNTER — TELEPHONE (OUTPATIENT)
Dept: OTOLARYNGOLOGY | Facility: CLINIC | Age: 68
End: 2023-10-02
Payer: MEDICARE

## 2023-10-02 NOTE — TELEPHONE ENCOUNTER
----- Message from Brittany Trujillo sent at 10/2/2023  1:25 PM CDT -----  Contact: P [phone]  Type: Returning a call    Who left a message?Gunnar Duenas MA    When did the practice call? 09/27/2023    Comments: Patient would like a phone call from the staff to reschedule her appointment that was canceled on 10/02/2023

## 2023-10-16 NOTE — TELEPHONE ENCOUNTER
No care due was identified.  Health [facility] Embedded Care Due Messages. Reference number: [id number].   10/16/2023 10:14:03 AM CDT

## 2023-10-17 RX ORDER — PRAVASTATIN SODIUM 20 MG/1
20 TABLET ORAL
Qty: 90 TABLET | Refills: 1 | Status: SHIPPED | OUTPATIENT
Start: 2023-10-17

## 2023-10-17 NOTE — TELEPHONE ENCOUNTER
Refill Decision Note   Shira [place]  is requesting a refill authorization.  Brief Assessment and Rationale for Refill:  Approve     Medication Therapy Plan:         Comments:     Note composed:4:05 AM 10/17/2023

## 2023-10-24 ENCOUNTER — OFFICE VISIT (OUTPATIENT)
Dept: OTOLARYNGOLOGY | Facility: CLINIC | Age: 68
End: 2023-10-24
Payer: COMMERCIAL

## 2023-10-24 VITALS
SYSTOLIC BLOOD PRESSURE: 133 MMHG | WEIGHT: 114 LBS | HEART RATE: 79 BPM | BODY MASS INDEX: 20.2 KG/M2 | HEIGHT: 63 IN | DIASTOLIC BLOOD PRESSURE: 83 MMHG

## 2023-10-24 DIAGNOSIS — L90.5 FACIAL SCAR: Primary | ICD-10-CM

## 2023-10-24 PROCEDURE — 3044F PR MOST RECENT HEMOGLOBIN A1C LEVEL <7.0%: ICD-10-PCS | Mod: CPTII,S$GLB,, | Performed by: OTOLARYNGOLOGY

## 2023-10-24 PROCEDURE — 1159F PR MEDICATION LIST DOCUMENTED IN MEDICAL RECORD: ICD-10-PCS | Mod: CPTII,S$GLB,, | Performed by: OTOLARYNGOLOGY

## 2023-10-24 PROCEDURE — 1100F PR PT FALLS ASSESS DOC 2+ FALLS/FALL W/INJURY/YR: ICD-10-PCS | Mod: CPTII,S$GLB,, | Performed by: OTOLARYNGOLOGY

## 2023-10-24 PROCEDURE — 3008F PR BODY MASS INDEX (BMI) DOCUMENTED: ICD-10-PCS | Mod: CPTII,S$GLB,, | Performed by: OTOLARYNGOLOGY

## 2023-10-24 PROCEDURE — 3288F PR FALLS RISK ASSESSMENT DOCUMENTED: ICD-10-PCS | Mod: CPTII,S$GLB,, | Performed by: OTOLARYNGOLOGY

## 2023-10-24 PROCEDURE — 3075F SYST BP GE 130 - 139MM HG: CPT | Mod: CPTII,S$GLB,, | Performed by: OTOLARYNGOLOGY

## 2023-10-24 PROCEDURE — 3079F DIAST BP 80-89 MM HG: CPT | Mod: CPTII,S$GLB,, | Performed by: OTOLARYNGOLOGY

## 2023-10-24 PROCEDURE — 99213 OFFICE O/P EST LOW 20 MIN: CPT | Mod: S$GLB,,, | Performed by: OTOLARYNGOLOGY

## 2023-10-24 PROCEDURE — 99213 PR OFFICE/OUTPT VISIT, EST, LEVL III, 20-29 MIN: ICD-10-PCS | Mod: S$GLB,,, | Performed by: OTOLARYNGOLOGY

## 2023-10-24 PROCEDURE — 3079F PR MOST RECENT DIASTOLIC BLOOD PRESSURE 80-89 MM HG: ICD-10-PCS | Mod: CPTII,S$GLB,, | Performed by: OTOLARYNGOLOGY

## 2023-10-24 PROCEDURE — 3044F HG A1C LEVEL LT 7.0%: CPT | Mod: CPTII,S$GLB,, | Performed by: OTOLARYNGOLOGY

## 2023-10-24 PROCEDURE — 3008F BODY MASS INDEX DOCD: CPT | Mod: CPTII,S$GLB,, | Performed by: OTOLARYNGOLOGY

## 2023-10-24 PROCEDURE — 3288F FALL RISK ASSESSMENT DOCD: CPT | Mod: CPTII,S$GLB,, | Performed by: OTOLARYNGOLOGY

## 2023-10-24 PROCEDURE — 1159F MED LIST DOCD IN RCRD: CPT | Mod: CPTII,S$GLB,, | Performed by: OTOLARYNGOLOGY

## 2023-10-24 PROCEDURE — 1160F PR REVIEW ALL MEDS BY PRESCRIBER/CLIN PHARMACIST DOCUMENTED: ICD-10-PCS | Mod: CPTII,S$GLB,, | Performed by: OTOLARYNGOLOGY

## 2023-10-24 PROCEDURE — 1160F RVW MEDS BY RX/DR IN RCRD: CPT | Mod: CPTII,S$GLB,, | Performed by: OTOLARYNGOLOGY

## 2023-10-24 PROCEDURE — 3075F PR MOST RECENT SYSTOLIC BLOOD PRESS GE 130-139MM HG: ICD-10-PCS | Mod: CPTII,S$GLB,, | Performed by: OTOLARYNGOLOGY

## 2023-10-24 PROCEDURE — 1100F PTFALLS ASSESS-DOCD GE2>/YR: CPT | Mod: CPTII,S$GLB,, | Performed by: OTOLARYNGOLOGY

## 2023-10-24 RX ORDER — FAMOTIDINE 20 MG/1
TABLET, FILM COATED ORAL
Qty: 60 TABLET | Refills: 11 | Status: SHIPPED | OUTPATIENT
Start: 2023-10-24

## 2023-10-24 NOTE — PROGRESS NOTES
is a 68 year old white female who is approximately 6 weeks status post a fall with facial trauma.  She was seen by Dr. Macdonald and had significant facial contusions however no apparent fractures.  She is complaining of a small lump in her submental crease.  On exam she has some apparent slight scar tissue of proximally 0.5 cm in diameter.  This is nontender for her with no apparent fluid collection.  I have discussed this with her and I have recommended topical silicone with digital massage.  No surgical intervention warranted at this time.  She will return to clinic here p.r.n. and will follow-up regularly with Dr. Macdonald.

## 2024-01-04 DIAGNOSIS — Z78.0 MENOPAUSE: ICD-10-CM

## 2024-01-24 DIAGNOSIS — Z12.31 OTHER SCREENING MAMMOGRAM: ICD-10-CM

## 2024-03-04 ENCOUNTER — HOSPITAL ENCOUNTER (OUTPATIENT)
Dept: RADIOLOGY | Facility: HOSPITAL | Age: 69
Discharge: HOME | End: 2024-03-04
Attending: FAMILY MEDICINE
Payer: MEDICARE

## 2024-03-04 DIAGNOSIS — Z12.31 OTHER SCREENING MAMMOGRAM: ICD-10-CM

## 2024-03-04 PROCEDURE — 77067 SCR MAMMO BI INCL CAD: CPT | Mod: TC,PO

## 2024-03-04 PROCEDURE — 77063 BREAST TOMOSYNTHESIS BI: CPT | Mod: 26,,, | Performed by: RADIOLOGY

## 2024-03-04 PROCEDURE — 77067 SCR MAMMO BI INCL CAD: CPT | Mod: 26,,, | Performed by: RADIOLOGY

## 2024-03-11 ENCOUNTER — TELEPHONE (OUTPATIENT)
Dept: RADIOLOGY | Facility: HOSPITAL | Age: 69
End: 2024-03-11
Payer: MEDICARE

## 2024-03-12 ENCOUNTER — TELEPHONE (OUTPATIENT)
Dept: RADIOLOGY | Facility: HOSPITAL | Age: 69
End: 2024-03-12
Payer: MEDICARE

## 2024-03-12 NOTE — TELEPHONE ENCOUNTER
Spoke with patient and explained mammogram findings.Patient expressed understanding of results. Patient scheduled abnormal mammogram follow up appointment at [facility] on 3/14/2024.

## 2024-03-12 NOTE — TELEPHONE ENCOUNTER
Called patient in reference to her breast imaging and scheduling a abnormal mammogram follow up. Patient not available, left message with my contact information with patients .

## 2024-03-14 ENCOUNTER — HOSPITAL ENCOUNTER (OUTPATIENT)
Dept: RADIOLOGY | Facility: HOSPITAL | Age: 69
Discharge: HOME | End: 2024-03-14
Attending: FAMILY MEDICINE
Payer: MEDICARE

## 2024-03-14 DIAGNOSIS — R92.8 ABNORMAL FINDING ON BREAST IMAGING: ICD-10-CM

## 2024-03-14 PROCEDURE — 77061 BREAST TOMOSYNTHESIS UNI: CPT | Mod: 26,LT,, | Performed by: RADIOLOGY

## 2024-03-14 PROCEDURE — 77065 DX MAMMO INCL CAD UNI: CPT | Mod: 26,LT,, | Performed by: RADIOLOGY

## 2024-03-14 PROCEDURE — 77061 BREAST TOMOSYNTHESIS UNI: CPT | Mod: TC,LT

## 2024-03-14 PROCEDURE — 77065 DX MAMMO INCL CAD UNI: CPT | Mod: TC,LT

## 2024-03-26 RX ORDER — BISOPROLOL FUMARATE 10 MG/1
10 TABLET, FILM COATED ORAL
COMMUNITY
End: 2024-05-14

## 2024-03-26 RX ORDER — BISOPROLOL FUMARATE 10 MG/1
10 TABLET, FILM COATED ORAL
Status: CANCELLED | OUTPATIENT
Start: 2024-03-26

## 2024-04-12 NOTE — TELEPHONE ENCOUNTER
Care Due:                  Date            Visit Type   Department     Provider  --------------------------------------------------------------------------------                                EP -                              PRIMARY      [facility] FAMILY  Last Visit: 08-      CARE ([facility])   MEDICINE       Patti Price                              MYCHART                              ANNUAL                              CHECKUP/PHY  [facility]  Next Visit: 05-      S            MEDICINE       Patti Price                                                            Last  Test          Frequency    Reason                     Performed    Due Date  --------------------------------------------------------------------------------    Lipid Panel.  12 months..  pravastatin..............  01- 01-    Health [facility] Embedded Care Due Messages. Reference number: [id number].   4/12/2024 1:53:32 PM CDT

## 2024-04-12 NOTE — TELEPHONE ENCOUNTER
----- Message from Alberta Houston sent at 4/12/2024  1:49 PM CDT -----  Regarding: rx refill  Type: RX Refill Request    Who Called:     Pharmacy Name:   [id number] - [place], [place] - [address] AT [address] [facility]  [address] [facility] [address] [address]  Phone: [phone] Fax: [phone]        Refill or New Rx:    RX Name and Strength: pravastatin (PRAVACHOL) 20 MG tablet    How is the patient currently taking it? (ex. 1XDay):    Is this a 30 day or 90 day RX:    Additional Notes:

## 2024-04-13 DIAGNOSIS — E03.9 ACQUIRED HYPOTHYROIDISM: ICD-10-CM

## 2024-04-13 DIAGNOSIS — E78.2 MIXED HYPERLIPIDEMIA: Primary | ICD-10-CM

## 2024-04-13 RX ORDER — PRAVASTATIN SODIUM 20 MG/1
20 TABLET ORAL DAILY
Qty: 90 TABLET | Refills: 1 | Status: SHIPPED | OUTPATIENT
Start: 2024-04-13

## 2024-04-15 ENCOUNTER — TELEPHONE (OUTPATIENT)
Dept: FAMILY MEDICINE | Facility: CLINIC | Age: 69
End: 2024-04-15
Payer: MEDICARE

## 2024-04-15 NOTE — TELEPHONE ENCOUNTER
----- Message from Patti Price MD sent at 4/13/2024 11:42 AM CDT -----  Regarding: virtual visit  [facility] help pt make an appt with lab pta

## 2024-05-14 ENCOUNTER — LAB VISIT (OUTPATIENT)
Dept: LAB | Facility: HOSPITAL | Age: 69
End: 2024-05-14
Attending: FAMILY MEDICINE
Payer: MEDICARE

## 2024-05-14 ENCOUNTER — OFFICE VISIT (OUTPATIENT)
Dept: FAMILY MEDICINE | Facility: CLINIC | Age: 69
End: 2024-05-14
Attending: FAMILY MEDICINE
Payer: MEDICARE

## 2024-05-14 VITALS
SYSTOLIC BLOOD PRESSURE: 128 MMHG | OXYGEN SATURATION: 100 % | DIASTOLIC BLOOD PRESSURE: 69 MMHG | HEART RATE: 86 BPM | BODY MASS INDEX: 19.13 KG/M2 | WEIGHT: 108 LBS

## 2024-05-14 DIAGNOSIS — E03.9 ACQUIRED HYPOTHYROIDISM: ICD-10-CM

## 2024-05-14 DIAGNOSIS — Z78.0 ASYMPTOMATIC MENOPAUSE: ICD-10-CM

## 2024-05-14 DIAGNOSIS — R79.9 ABNORMAL FINDING OF BLOOD CHEMISTRY, UNSPECIFIED: ICD-10-CM

## 2024-05-14 DIAGNOSIS — E83.52 HYPERCALCEMIA: ICD-10-CM

## 2024-05-14 DIAGNOSIS — E78.2 MIXED HYPERLIPIDEMIA: ICD-10-CM

## 2024-05-14 DIAGNOSIS — H90.0 CONDUCTIVE HEARING LOSS, BILATERAL: ICD-10-CM

## 2024-05-14 DIAGNOSIS — Z00.00 ANNUAL PHYSICAL EXAM: ICD-10-CM

## 2024-05-14 DIAGNOSIS — Z00.00 ANNUAL PHYSICAL EXAM: Primary | ICD-10-CM

## 2024-05-14 LAB
ALBUMIN SERPL BCP-MCNC: 4.2 G/DL (ref 3.5–5.2)
ALP SERPL-CCNC: 82 U/L (ref 55–135)
ALT SERPL W/O P-5'-P-CCNC: 13 U/L (ref 10–44)
ANION GAP SERPL CALC-SCNC: 10 MMOL/L (ref 8–16)
AST SERPL-CCNC: 19 U/L (ref 10–40)
BACTERIA #/AREA URNS AUTO: ABNORMAL /HPF
BASOPHILS # BLD AUTO: 0.04 K/UL (ref 0–0.2)
BASOPHILS NFR BLD: 0.8 % (ref 0–1.9)
BILIRUB SERPL-MCNC: 0.5 MG/DL (ref 0.1–1)
BILIRUB UR QL STRIP: NEGATIVE
BUN SERPL-MCNC: 15 MG/DL (ref 8–23)
CALCIUM SERPL-MCNC: 10.5 MG/DL (ref 8.7–10.5)
CAOX CRY UR QL COMP ASSIST: ABNORMAL
CHLORIDE SERPL-SCNC: 110 MMOL/L (ref 95–110)
CLARITY UR REFRACT.AUTO: ABNORMAL
CO2 SERPL-SCNC: 24 MMOL/L (ref 23–29)
COLOR UR AUTO: YELLOW
CREAT SERPL-MCNC: 1.1 MG/DL (ref 0.5–1.4)
DIFFERENTIAL METHOD BLD: ABNORMAL
EOSINOPHIL # BLD AUTO: 0.2 K/UL (ref 0–0.5)
EOSINOPHIL NFR BLD: 3.8 % (ref 0–8)
ERYTHROCYTE [DISTWIDTH] IN BLOOD BY AUTOMATED COUNT: 13.8 % (ref 11.5–14.5)
EST. GFR  (NO RACE VARIABLE): 54.1 ML/MIN/1.73 M^2
ESTIMATED AVG GLUCOSE: 100 MG/DL (ref 68–131)
GLUCOSE SERPL-MCNC: 75 MG/DL (ref 70–110)
GLUCOSE UR QL STRIP: NEGATIVE
HBA1C MFR BLD: 5.1 % (ref 4–5.6)
HCT VFR BLD AUTO: 39.4 % (ref 37–48.5)
HGB BLD-MCNC: 13 G/DL (ref 12–16)
HGB UR QL STRIP: ABNORMAL
HYALINE CASTS UR QL AUTO: 61 /LPF
IMM GRANULOCYTES # BLD AUTO: 0.01 K/UL (ref 0–0.04)
IMM GRANULOCYTES NFR BLD AUTO: 0.2 % (ref 0–0.5)
KETONES UR QL STRIP: NEGATIVE
LEUKOCYTE ESTERASE UR QL STRIP: ABNORMAL
LYMPHOCYTES # BLD AUTO: 1.6 K/UL (ref 1–4.8)
LYMPHOCYTES NFR BLD: 31.5 % (ref 18–48)
MCH RBC QN AUTO: 32.5 PG (ref 27–31)
MCHC RBC AUTO-ENTMCNC: 33 G/DL (ref 32–36)
MCV RBC AUTO: 99 FL (ref 82–98)
MICROSCOPIC COMMENT: ABNORMAL
MONOCYTES # BLD AUTO: 0.6 K/UL (ref 0.3–1)
MONOCYTES NFR BLD: 11.3 % (ref 4–15)
NEUTROPHILS # BLD AUTO: 2.6 K/UL (ref 1.8–7.7)
NEUTROPHILS NFR BLD: 52.4 % (ref 38–73)
NITRITE UR QL STRIP: NEGATIVE
NON-SQ EPI CELLS #/AREA URNS AUTO: 1 /HPF
NRBC BLD-RTO: 0 /100 WBC
PH UR STRIP: 6 [PH] (ref 5–8)
PLATELET # BLD AUTO: 236 K/UL (ref 150–450)
PMV BLD AUTO: 10.6 FL (ref 9.2–12.9)
POTASSIUM SERPL-SCNC: 4.3 MMOL/L (ref 3.5–5.1)
PROT SERPL-MCNC: 7.8 G/DL (ref 6–8.4)
PROT UR QL STRIP: ABNORMAL
PTH-INTACT SERPL-MCNC: 64.7 PG/ML (ref 9–77)
RBC # BLD AUTO: 4 M/UL (ref 4–5.4)
RBC #/AREA URNS AUTO: 7 /HPF (ref 0–4)
SODIUM SERPL-SCNC: 144 MMOL/L (ref 136–145)
SP GR UR STRIP: 1.02 (ref 1–1.03)
SQUAMOUS #/AREA URNS AUTO: 16 /HPF
TSH SERPL DL<=0.005 MIU/L-ACNC: 1.03 UIU/ML (ref 0.4–4)
URN SPEC COLLECT METH UR: ABNORMAL
WBC # BLD AUTO: 4.95 K/UL (ref 3.9–12.7)
WBC #/AREA URNS AUTO: 62 /HPF (ref 0–5)

## 2024-05-14 PROCEDURE — 81001 URINALYSIS AUTO W/SCOPE: CPT | Performed by: FAMILY MEDICINE

## 2024-05-14 PROCEDURE — 99214 OFFICE O/P EST MOD 30 MIN: CPT | Mod: S$PBB,,, | Performed by: FAMILY MEDICINE

## 2024-05-14 PROCEDURE — 83970 ASSAY OF PARATHORMONE: CPT | Performed by: FAMILY MEDICINE

## 2024-05-14 PROCEDURE — 84443 ASSAY THYROID STIM HORMONE: CPT | Performed by: FAMILY MEDICINE

## 2024-05-14 PROCEDURE — 36415 COLL VENOUS BLD VENIPUNCTURE: CPT | Mod: PO | Performed by: FAMILY MEDICINE

## 2024-05-14 PROCEDURE — 99999 PR PBB SHADOW E&M-EST. PATIENT-LVL IV: CPT | Mod: PBBFAC,,, | Performed by: FAMILY MEDICINE

## 2024-05-14 PROCEDURE — 83036 HEMOGLOBIN GLYCOSYLATED A1C: CPT | Performed by: FAMILY MEDICINE

## 2024-05-14 PROCEDURE — 80053 COMPREHEN METABOLIC PANEL: CPT | Performed by: FAMILY MEDICINE

## 2024-05-14 PROCEDURE — 85025 COMPLETE CBC W/AUTO DIFF WBC: CPT | Performed by: FAMILY MEDICINE

## 2024-05-14 PROCEDURE — 99214 OFFICE O/P EST MOD 30 MIN: CPT | Mod: PBBFAC,PO | Performed by: FAMILY MEDICINE

## 2024-05-14 RX ORDER — CEPHALEXIN 500 MG/1
CAPSULE ORAL
COMMUNITY

## 2024-05-14 RX ORDER — POLYETHYLENE GLYCOL 3350, SODIUM SULFATE ANHYDROUS, SODIUM BICARBONATE, SODIUM CHLORIDE, POTASSIUM CHLORIDE 236; 22.74; 6.74; 5.86; 2.97 G/4L; G/4L; G/4L; G/4L; G/4L
POWDER, FOR SOLUTION ORAL
COMMUNITY

## 2024-05-14 NOTE — PROGRESS NOTES
Subjective:       Patient ID: Shira Vega is a 68 y.o. female.    Chief Complaint: Annual Exam    HPI  Pt is here for annual exam pt is well no sob/cp no cough chest congestion no sore throat uri symptoms  Pt denies dysuria hematuria no vaginal bleeding  Pt denies n/v/f/c/d/c no change in bowel habits no brbpr  Pt has hypothyroid no temp intolerance on synthroid  Pt has hypercholesterolemia on statin no muscle aches    Review of Systems   Constitutional:  Negative for activity change, chills, diaphoresis, fatigue, fever and unexpected weight change.   HENT:  Negative for congestion, ear discharge, ear pain, hearing loss, postnasal drip, rhinorrhea, sinus pressure, sneezing, sore throat, trouble swallowing and voice change.    Eyes:  Negative for photophobia, discharge, redness, itching and visual disturbance.   Respiratory:  Negative for cough, chest tightness, shortness of breath and wheezing.    Cardiovascular:  Negative for chest pain, palpitations and leg swelling.   Gastrointestinal:  Negative for abdominal pain, anal bleeding, blood in stool, constipation, diarrhea, nausea, rectal pain and vomiting.   Endocrine: Negative for polydipsia and polyuria.   Genitourinary:  Negative for difficulty urinating, dyspareunia, dysuria, flank pain, frequency, hematuria, menstrual problem, pelvic pain, urgency, vaginal bleeding and vaginal discharge.   Musculoskeletal:  Negative for arthralgias, back pain, joint swelling and neck pain.   Skin:  Negative for color change and rash.   Neurological:  Negative for dizziness, speech difficulty, weakness, light-headedness, numbness and headaches.   Hematological:  Does not bruise/bleed easily.   Psychiatric/Behavioral:  Negative for agitation, confusion, decreased concentration, dysphoric mood, sleep disturbance and suicidal ideas. The patient is not nervous/anxious.        Objective:    /69   Pulse 86   Wt 49 kg (108 lb)   SpO2 100%   BMI 19.13 kg/m²     Physical  Exam  Constitutional:       Appearance: Normal appearance. She is well-developed. She is not ill-appearing.   HENT:      Head: Normocephalic and atraumatic.      Right Ear: External ear normal.      Left Ear: External ear normal.      Nose: Nose normal.   Eyes:      General:         Right eye: No discharge.         Left eye: No discharge.      Extraocular Movements: Extraocular movements intact.      Conjunctiva/sclera: Conjunctivae normal.      Pupils: Pupils are equal, round, and reactive to light.   Neck:      Thyroid: No thyromegaly.   Cardiovascular:      Rate and Rhythm: Normal rate and regular rhythm.      Heart sounds: Normal heart sounds. No murmur heard.     No friction rub. No gallop.   Pulmonary:      Effort: Pulmonary effort is normal.      Breath sounds: Normal breath sounds. No wheezing or rales.   Abdominal:      General: Bowel sounds are normal. There is no distension.      Palpations: Abdomen is soft.      Tenderness: There is no abdominal tenderness. There is no guarding or rebound.   Genitourinary:     Vagina: Normal.   Musculoskeletal:         General: No tenderness. Normal range of motion.      Cervical back: Normal range of motion and neck supple.   Lymphadenopathy:      Cervical: No cervical adenopathy.   Skin:     General: Skin is warm and dry.      Findings: No erythema or rash.   Neurological:      Mental Status: She is alert and oriented to person, place, and time.      Motor: No abnormal muscle tone.      Coordination: Coordination normal.      Gait: Gait normal.   Psychiatric:         Mood and Affect: Mood normal.         Behavior: Behavior normal.         Thought Content: Thought content normal.         Judgment: Judgment normal.         Assessment:       1. Annual physical exam    2. Acquired hypothyroidism    3. Mixed hyperlipidemia    4. Abnormal finding of blood chemistry, unspecified    5. Conductive hearing loss, bilateral    6. Asymptomatic menopause        Plan:     Orders [facility]  "lipid tsh urine hgb A1c cbc EKG cxr bmd cologuard  Cont meds  Low fat diet  Graded exercise  Rtc annually and 6 months    Health maintenance  Discussed with pt studies declined by        "This note will not be shared with the patient."     "

## 2024-06-14 ENCOUNTER — TELEPHONE (OUTPATIENT)
Dept: FAMILY MEDICINE | Facility: CLINIC | Age: 69
End: 2024-06-14
Payer: MEDICARE

## 2024-06-14 DIAGNOSIS — E03.9 ACQUIRED HYPOTHYROIDISM: Primary | ICD-10-CM

## 2024-06-14 NOTE — TELEPHONE ENCOUNTER
----- Message from aPtti Price MD sent at 6/14/2024  7:50 AM CDT -----  Regarding: thyroid follow up  Please help pt schedule thyroid follow up virtual or in clinic with NP lab orders in for pta

## 2024-07-03 ENCOUNTER — TELEPHONE (OUTPATIENT)
Dept: OTOLARYNGOLOGY | Facility: CLINIC | Age: 69
End: 2024-07-03
Payer: MEDICARE

## 2024-08-12 NOTE — TELEPHONE ENCOUNTER
Care Due:                  Date            Visit Type   Department     Provider  --------------------------------------------------------------------------------                                MYCHART                              ANNUAL                              CHECKUP/PHY  [facility] FAMILY  Last Visit: 05-      [facility]       Patti Price  Next Visit: None Scheduled  None         None Found                                                            Last  Test          Frequency    Reason                     Performed    Due Date  --------------------------------------------------------------------------------    Lipid Panel.  12 months..  pravastatin..............  01- 01-    Health [facility] Embedded Care Due Messages. Reference number: [id number].   8/12/2024 12:59:05 PM CDT

## 2024-08-12 NOTE — TELEPHONE ENCOUNTER
----- Message from Christina Kemp sent at 8/12/2024  1:36 PM CDT -----  Regarding: refill  Name of caller:Shira       What is the requesting detail: pt is requesting a refill for levothyroxine (SYNTHROID) 50 MCG tablet. Please advise     [id number] [place] - [address] AT [facility] [address] [facility]          Can the clinic reply by [facility]:       What number to call back: [phone]

## 2024-08-13 RX ORDER — LEVOTHYROXINE SODIUM 50 UG/1
50 TABLET ORAL
Qty: 90 TABLET | Refills: 2 | Status: SHIPPED | OUTPATIENT
Start: 2024-08-13

## 2024-09-13 ENCOUNTER — PATIENT MESSAGE (OUTPATIENT)
Dept: OTOLARYNGOLOGY | Facility: CLINIC | Age: 69
End: 2024-09-13
Payer: MEDICARE

## 2024-09-13 ENCOUNTER — TELEPHONE (OUTPATIENT)
Dept: OTOLARYNGOLOGY | Facility: CLINIC | Age: 69
End: 2024-09-13
Payer: MEDICARE

## 2024-09-18 NOTE — TELEPHONE ENCOUNTER
No care due was identified.  [facility] Embedded Care Due Messages. Reference number: [id number].   9/18/2024 4:56:53 PM CDT

## 2024-09-19 ENCOUNTER — TELEPHONE (OUTPATIENT)
Dept: OTOLARYNGOLOGY | Facility: CLINIC | Age: 69
End: 2024-09-19
Payer: MEDICARE

## 2024-09-19 ENCOUNTER — PATIENT MESSAGE (OUTPATIENT)
Dept: OTOLARYNGOLOGY | Facility: CLINIC | Age: 69
End: 2024-09-19
Payer: MEDICARE

## 2024-09-19 RX ORDER — FAMOTIDINE 20 MG/1
20 TABLET, FILM COATED ORAL 2 TIMES DAILY
Qty: 60 TABLET | Refills: 11 | Status: SHIPPED | OUTPATIENT
Start: 2024-09-19

## 2024-09-19 RX ORDER — FLUTICASONE PROPIONATE 50 MCG
2 SPRAY, SUSPENSION (ML) NASAL DAILY
Qty: 54.6 ML | Refills: 3 | Status: SHIPPED | OUTPATIENT
Start: 2024-09-19

## 2024-09-23 RX ORDER — LEVOTHYROXINE SODIUM 50 UG/1
50 TABLET ORAL
Qty: 90 TABLET | Refills: 2 | Status: SHIPPED | OUTPATIENT
Start: 2024-09-23

## 2024-09-23 RX ORDER — PRAVASTATIN SODIUM 20 MG/1
20 TABLET ORAL DAILY
Qty: 90 TABLET | Refills: 1 | Status: SHIPPED | OUTPATIENT
Start: 2024-09-23

## 2024-11-14 ENCOUNTER — OFFICE VISIT (OUTPATIENT)
Dept: OTOLARYNGOLOGY | Facility: CLINIC | Age: 69
End: 2024-11-14
Payer: MEDICARE

## 2024-11-14 VITALS
SYSTOLIC BLOOD PRESSURE: 145 MMHG | BODY MASS INDEX: 19.72 KG/M2 | HEART RATE: 91 BPM | WEIGHT: 111.31 LBS | DIASTOLIC BLOOD PRESSURE: 68 MMHG

## 2024-11-14 DIAGNOSIS — H81.4 VERTIGO OF CENTRAL ORIGIN: ICD-10-CM

## 2024-11-14 DIAGNOSIS — R13.10 DYSPHAGIA, UNSPECIFIED TYPE: ICD-10-CM

## 2024-11-14 DIAGNOSIS — K21.9 LARYNGOPHARYNGEAL REFLUX (LPR): ICD-10-CM

## 2024-11-14 DIAGNOSIS — J34.2 NASAL SEPTAL DEVIATION: ICD-10-CM

## 2024-11-14 DIAGNOSIS — Z86.79 HISTORY OF INTRACRANIAL HEMORRHAGE: ICD-10-CM

## 2024-11-14 DIAGNOSIS — Z74.09 IMPAIRED FUNCTIONAL MOBILITY, BALANCE, AND ENDURANCE: Primary | ICD-10-CM

## 2024-11-14 DIAGNOSIS — R09.89 CHRONIC THROAT CLEARING: ICD-10-CM

## 2024-11-14 DIAGNOSIS — R49.0 HOARSENESS: ICD-10-CM

## 2024-11-14 DIAGNOSIS — J30.9 ALLERGIC RHINITIS, UNSPECIFIED SEASONALITY, UNSPECIFIED TRIGGER: ICD-10-CM

## 2024-11-14 PROCEDURE — 99999 PR PBB SHADOW E&M-EST. PATIENT-LVL IV: CPT | Mod: PBBFAC,,, | Performed by: SPECIALIST

## 2024-11-14 PROCEDURE — 99214 OFFICE O/P EST MOD 30 MIN: CPT | Mod: 25,S$PBB,, | Performed by: SPECIALIST

## 2024-11-14 PROCEDURE — 99214 OFFICE O/P EST MOD 30 MIN: CPT | Mod: PBBFAC,PN | Performed by: SPECIALIST

## 2024-11-14 PROCEDURE — 31575 DIAGNOSTIC LARYNGOSCOPY: CPT | Mod: PBBFAC,PN | Performed by: SPECIALIST

## 2024-11-14 NOTE — PROGRESS NOTES
Subjective:       Patient ID: Shira Vega is a 69 y.o. female.    Chief Complaint: Follow-up      The patient is coming in for follow-up visit.  It has been 14 months since I last saw the patient.  There are multiple issues to discuss:  1. Laryngal pharyngeal reflux:  The patient continues to have frequent throat clearing and pill dysphagia to larger pills.  She is not having globus sensation.  She does frequently cough and have to clear her throat after drinking clear thin liquids.  She does not having issues with semi-solid or solid foods.  She is taking famotidine twice daily.  She is trying to adhere to her anti-reflux diet.  2. Allergic rhinitis:  Nasal secretions are clear.  She uses Flonase daily and supplements with loratadine when needed.  Nasal secretions are clear.  3. Chronic imbalance/central vestibular disorder:  The patient continues having chronic imbalance; however, she has been doing physical therapy on a regular basis and symptoms are significantly improved from her last visit.  At her last visit she had recently fallen at home in his significant facial bruising.  With her physical therapy and prudent measures taken it home she has not fallen in the last 6 months.  When walking through her house she will walk from piece of furniture to piece of furniture to door casing lightly touching to stabilize herself.  If she is on a smooth surface she can walk unassisted.  She does have a cane which she rarely uses.  She is no longer using a walker.     4. History traumatic brain injury:  The patient continues to see a neurologist.  At her last visit she was having significant issues with word recall.  She did undergo 17 weeks of speech therapy and this issue has improved greatly.  Overall, she continues to slowly improve in all parameters.          Review of Systems     Constitutional: Positive for fatigue.  Negative for appetite change, chills, fever and unexpected weight loss.      HENT: Positive for  facial swelling, postnasal drip, runny nose, sinus pressure, sore throat and stuffy nose.  Negative for ear discharge, ear infection, ear pain, hearing loss, mouth sores, nosebleeds, ringing in the ears, sinus infection, tonsil infection, dental problems, trouble swallowing and voice change.      Eyes:  Negative for change in eyesight, eye drainage, eye itching and photophobia.     Respiratory:  Positive for cough. Negative for shortness of breath, sleep apnea, snoring and wheezing.  Choking episodes    Cardiovascular:  Negative for chest pain, foot swelling, irregular heartbeat and swollen veins.     Gastrointestinal:  Negative for abdominal pain, acid reflux, constipation, diarrhea, heartburn and vomiting.     Genitourinary: Negative for difficulty urinating, sexual problems and frequent urination.     Musc: Positive for aching muscles. Negative for aching joints, back pain and neck pain.     Skin: Negative for rash.     Allergy: Positive for seasonal allergies. Negative for food allergies.     Endocrine: Negative for cold intolerance and heat intolerance.      Neurological: Positive for dizziness, headaches and light-headedness. Negative for seizures and tremors.  Weakness  Word recall difficulties, improved from last visit    Hematologic: Negative for bruises/bleeds easily and swollen glands.      Psychiatric: Positive for decreased concentration. Negative for depression, nervous/anxious and sleep disturbance.                Objective:      Physical Exam  Vitals and nursing note reviewed.   Constitutional:       General: She is awake.      Appearance: Normal appearance. She is well-developed, well-groomed and normal weight. She is not ill-appearing (facial bruising with recent fall at home).   HENT:      Head: Normocephalic.      Jaw: There is normal jaw occlusion. No trismus, tenderness, swelling, pain on movement or malocclusion.      Salivary Glands: Right salivary gland is not diffusely enlarged or tender.  Left salivary gland is not diffusely enlarged or tender.        Comments:       Right Ear: Hearing, ear canal and external ear normal. Tympanic membrane is retracted.      Left Ear: Hearing, ear canal and external ear normal. Tympanic membrane is retracted.      Nose: Septal deviation (to the left), mucosal edema (cyanotic, boggy inferior turbinates bilaterally) and rhinorrhea (clear mucus bilaterally) present. No nasal deformity. Rhinorrhea is clear.      Right Turbinates: Enlarged and pale.      Left Turbinates: Enlarged and pale.      Mouth/Throat:      Lips: No lesions.      Mouth: Mucous membranes are moist. No oral lesions.      Dentition: No gum lesions.      Tongue: No lesions.      Palate: No mass and lesions.      Pharynx: Oropharynx is clear. Uvula midline.      Comments: Voice-minimally weak with poor projection, no raspiness  Eyes:      General: Lids are normal.         Right eye: No discharge.         Left eye: No discharge.      Conjunctiva/sclera:      Right eye: Right conjunctiva is injected. No exudate.     Left eye: Left conjunctiva is injected. No exudate.     Pupils: Pupils are equal, round, and reactive to light.   Neck:      Thyroid: No thyroid mass or thyromegaly.      Trachea: Trachea normal. No tracheal deviation.   Cardiovascular:      Rate and Rhythm: Normal rate and regular rhythm.      Pulses: Normal pulses.      Heart sounds: Normal heart sounds.   Pulmonary:      Effort: Pulmonary effort is normal.      Breath sounds: Normal breath sounds. No stridor. No decreased breath sounds, wheezing, rhonchi or rales.   Abdominal:      General: Bowel sounds are normal.      Palpations: Abdomen is soft.      Tenderness: There is no abdominal tenderness.   Musculoskeletal:         General: Normal range of motion.      Cervical back: Normal range of motion. No muscular tenderness.   Lymphadenopathy:      Head:      Right side of head: No submental, submandibular, preauricular, posterior auricular or  occipital adenopathy.      Left side of head: No submental, submandibular, preauricular, posterior auricular or occipital adenopathy.      Cervical: No cervical adenopathy.   Skin:     General: Skin is warm and dry.      Findings: No bruising (fine white here in the perioral area, chin and cheeks bilaterally), petechiae or rash.      Nails: There is no clubbing.   Neurological:      Mental Status: She is alert and oriented to person, place, and time.      Cranial Nerves: No cranial nerve deficit.      Sensory: No sensory deficit.      Gait: Gait normal.      Comments: Neurologic-no nystagmus, wide-based gait, tandem gait not attempted, Romberg unsteady, tandem Romberg falls to the left   Psychiatric:         Speech: Speech normal.         Behavior: Behavior normal. Behavior is cooperative.         Thought Content: Thought content normal.         Judgment: Judgment normal.         Flexible video nasolaryngoscopy:  Nasal septal deviation and nasal changes of rhinitis; laryngeal changes consistent with laryngal pharyngeal reflux that is improving significantly on H2 agonist therapy    Laryngeal pictures:                  Nasopharynx/torus tubarius pictures:              Right nasal passage pictures:          Left nasal passage pictures:                Assessment:       1. Impaired functional mobility, balance, and endurance    2. Vertigo of central origin    3. Allergic rhinitis, unspecified seasonality, unspecified trigger    4. History of intracranial hemorrhage    5. Laryngopharyngeal reflux (LPR)    6. Chronic throat clearing    7. Hoarseness    8. Dysphagia, unspecified type    9. Nasal septal deviation          Plan:       I  will have the patient continue with her famotidine 20 mg twice daily in her allergy medications.  The patient will require ongoing ENT evaluation and care for her chronic imbalance secondary to central vestibular abnormalities, swallowing and throat complaints related to laryngal pharyngeal  reflux and sinonasal allergy problems for the rest of her life.  There are insurance related issues.  I will highlight in forward a copy of this note to the insurer.  She will continue with her present drug regimen and I will recheck her in 3 months.                     DISCLAIMER: This note was prepared with [employer] voice recognition transcription software. Garbled syntax, mangled pronouns, and other bizarre constructions may be attributed to that software system. While efforts were made to correct any mistakes made by this voice recognition program, some errors and/or omissions may remain in the note that were missed when the note was originally created.

## 2024-11-14 NOTE — PROCEDURES
"Laryngoscopy    Date/Time: 11/14/2024 3:00 PM    Performed by: ДМИТРИЙ Macdonald MD  Authorized by: ДМИТРИЙ Macdonald MD    Time out: Immediately prior to procedure a "time out" was called to verify the correct patient, procedure, equipment, support staff and site/side marked as required.    Consent Done?:  Yes (Verbal)  Anesthesia:     Local anesthetic:  4% Xylocaine spray with Juan-Synephrine    Patient tolerance:  Patient tolerated the procedure well with no immediate complications    Decongestion performed?: Yes    Laryngoscopy:     Areas examined:  Larynx, hypopharynx, oropharynx, nasopharynx, nasal cavities and vocal cords    Laryngoscope size:  4 mm (Flexible video nasolaryngoscopy)  Nose External:      No external nasal deformity  Nose Intranasal:      Mucosa no polyps     Mucosa ulcers not present     No mucosa lesions present (Profuse clear mucus in the nasal passages bilaterally)     Septum gross deformity (septum deviated high to the right and on the floor the nose to the left with a posterior bony spur to the left)     Enlarged turbinates  Nasopharynx:      No mucosa lesions     Adenoids not present     Posterior choanae patent     Eustachian tube patent  Larynx/hypopharynx:      No epiglottis lesions     No epiglottis edema     No AE folds lesions     No vocal cord polyps     Equal and normal bilateral (vocal cords move symmetrically, no mucosal lesions)     No hypopharynx lesions     No piriform sinus pooling     No piriform sinus lesions     Post cricoid edema (mild, improved from last endoscopy in February 20, 2023)     Post cricoid erythema (minimal, improved significantly from last endoscopy in April 2023)     Flexible video nasolaryngoscopy-nasal septum deviation and nasal changes allergic rhinitis; laryngeal changes consistent with laryngal pharyngeal reflux that is improving on H2 agonist therapy    "

## 2025-02-14 ENCOUNTER — OFFICE VISIT (OUTPATIENT)
Dept: OTOLARYNGOLOGY | Facility: CLINIC | Age: 70
End: 2025-02-14
Payer: MEDICARE

## 2025-02-14 VITALS
WEIGHT: 112 LBS | OXYGEN SATURATION: 96 % | HEART RATE: 98 BPM | SYSTOLIC BLOOD PRESSURE: 167 MMHG | DIASTOLIC BLOOD PRESSURE: 75 MMHG | BODY MASS INDEX: 19.84 KG/M2

## 2025-02-14 DIAGNOSIS — Z86.79 HISTORY OF INTRACRANIAL HEMORRHAGE: ICD-10-CM

## 2025-02-14 DIAGNOSIS — K21.9 LARYNGOPHARYNGEAL REFLUX (LPR): ICD-10-CM

## 2025-02-14 DIAGNOSIS — R09.89 CHRONIC THROAT CLEARING: ICD-10-CM

## 2025-02-14 DIAGNOSIS — R13.10 DYSPHAGIA, UNSPECIFIED TYPE: ICD-10-CM

## 2025-02-14 DIAGNOSIS — H81.4 VERTIGO OF CENTRAL ORIGIN: ICD-10-CM

## 2025-02-14 DIAGNOSIS — J30.9 ALLERGIC RHINITIS, UNSPECIFIED SEASONALITY, UNSPECIFIED TRIGGER: ICD-10-CM

## 2025-02-14 DIAGNOSIS — J34.2 NASAL SEPTAL DEVIATION: ICD-10-CM

## 2025-02-14 DIAGNOSIS — R49.0 HOARSENESS: ICD-10-CM

## 2025-02-14 DIAGNOSIS — Z74.09 IMPAIRED FUNCTIONAL MOBILITY, BALANCE, AND ENDURANCE: Primary | ICD-10-CM

## 2025-02-14 PROCEDURE — 99213 OFFICE O/P EST LOW 20 MIN: CPT | Mod: PBBFAC,PN | Performed by: SPECIALIST

## 2025-02-14 PROCEDURE — 31575 DIAGNOSTIC LARYNGOSCOPY: CPT | Mod: PBBFAC,PN | Performed by: SPECIALIST

## 2025-02-14 NOTE — PROGRESS NOTES
Subjective:       Patient ID: Shira Vega is a 69 y.o. female.    Chief Complaint: No chief complaint on file.      The patient is coming in for follow-up visit.  It has been 3 months since I last saw the patient.  There are multiple issues to discuss:  1. Laryngal pharyngeal reflux:  The patient has not noticed any significant changes in her swallowing since her last visit.  She continues to have frequent throat clearing and pill dysphagia to larger pills.  She is not having globus sensation.  She does frequently cough and have to clear her throat after drinking clear thin liquids.  She does not having issues with semi-solid or solid foods.  She is taking famotidine twice daily.    2. Allergic rhinitis:  Nasal secretions are clear.  She uses Flonase daily and supplements with loratadine when needed.  Nasal secretions are clear.  3. Chronic imbalance/central vestibular disorder:  The patient continues having chronic imbalance; however, she has been doing physical therapy on a regular basis and finds that it has helped with her balance.  She has had no recent falls as she avoids situations that might put her at risk of falling.  When walking through her house she will walk from piece of furniture to piece of furniture to door casing lightly touching to stabilize herself.  She does have a cane which she rarely uses.   4. History traumatic brain injury:  The patient continues to see a neurologist.  She did undergo 17 weeks of speech therapy and this issue has improved greatly.  Overall, she continues to slowly improve in all parameters.          Review of Systems     Constitutional: Positive for fatigue.  Negative for appetite change, chills, fever and unexpected weight loss.      HENT: Positive for facial swelling, postnasal drip, runny nose, sinus pressure, sore throat and stuffy nose.  Negative for ear discharge, ear infection, ear pain, hearing loss, mouth sores, nosebleeds, ringing in the ears, sinus  29-Apr-2020 06:58 infection, tonsil infection, dental problems, trouble swallowing and voice change.      Eyes:  Negative for change in eyesight, eye drainage, eye itching and photophobia.     Respiratory:  Positive for cough. Negative for shortness of breath, sleep apnea, snoring and wheezing.  Choking episodes    Cardiovascular:  Negative for chest pain, foot swelling, irregular heartbeat and swollen veins.     Gastrointestinal:  Negative for abdominal pain, acid reflux, constipation, diarrhea, heartburn and vomiting.     Genitourinary: Negative for difficulty urinating, sexual problems and frequent urination.     Musc: Positive for aching muscles. Negative for aching joints, back pain and neck pain.     Skin: Negative for rash.     Allergy: Positive for seasonal allergies. Negative for food allergies.     Endocrine: Negative for cold intolerance and heat intolerance.      Neurological: Positive for dizziness, headaches and light-headedness. Negative for seizures and tremors.  Weakness  Word recall difficulties, improved from last visit    Hematologic: Negative for bruises/bleeds easily and swollen glands.      Psychiatric: Positive for decreased concentration. Negative for depression, nervous/anxious and sleep disturbance.                Objective:      Physical Exam  Vitals and nursing note reviewed. Exam conducted with a chaperone present (Patient's  accompanies her to the visit).   Constitutional:       General: She is awake.      Appearance: Normal appearance. She is well-developed, well-groomed and normal weight. She is not ill-appearing.   HENT:      Head: Normocephalic.      Jaw: There is normal jaw occlusion. No trismus, tenderness, swelling, pain on movement or malocclusion.      Salivary Glands: Right salivary gland is not diffusely enlarged or tender. Left salivary gland is not diffusely enlarged or tender.        Comments:       Right Ear: Hearing, ear canal and external ear normal. Tympanic membrane is not  retracted.      Left Ear: Hearing, ear canal and external ear normal. Tympanic membrane is not retracted.      Nose: Septal deviation (to the left), mucosal edema (cyanotic, boggy inferior turbinates bilaterally) and rhinorrhea (clear mucus bilaterally) present. No nasal deformity. Rhinorrhea is clear.      Right Turbinates: Enlarged and pale.      Left Turbinates: Enlarged and pale.      Right Sinus: No maxillary sinus tenderness or frontal sinus tenderness.      Left Sinus: No maxillary sinus tenderness or frontal sinus tenderness.      Mouth/Throat:      Lips: No lesions.      Mouth: Mucous membranes are moist. No oral lesions.      Dentition: No gum lesions.      Tongue: No lesions.      Palate: No mass and lesions.      Pharynx: Oropharynx is clear. Uvula midline.      Comments: Voice-minimally weak with poor projection, no raspiness  Eyes:      General: Lids are normal.         Right eye: No discharge.         Left eye: No discharge.      Conjunctiva/sclera:      Right eye: Right conjunctiva is injected. No exudate.     Left eye: Left conjunctiva is injected. No exudate.     Pupils: Pupils are equal, round, and reactive to light.   Neck:      Thyroid: No thyroid mass or thyromegaly.      Trachea: Trachea normal. No tracheal deviation.   Cardiovascular:      Rate and Rhythm: Normal rate and regular rhythm.      Pulses: Normal pulses.      Heart sounds: Normal heart sounds.   Pulmonary:      Effort: Pulmonary effort is normal.      Breath sounds: Normal breath sounds. No stridor. No decreased breath sounds, wheezing, rhonchi or rales.   Abdominal:      General: Bowel sounds are normal.      Palpations: Abdomen is soft.      Tenderness: There is no abdominal tenderness.   Musculoskeletal:         General: Normal range of motion.      Cervical back: Normal range of motion. No muscular tenderness.   Lymphadenopathy:      Head:      Right side of head: No submental, submandibular, preauricular, posterior auricular  or occipital adenopathy.      Left side of head: No submental, submandibular, preauricular, posterior auricular or occipital adenopathy.      Cervical: No cervical adenopathy.   Skin:     General: Skin is warm and dry.      Findings: No bruising (fine white here in the perioral area, chin and cheeks bilaterally), petechiae or rash.      Nails: There is no clubbing.   Neurological:      Mental Status: She is alert and oriented to person, place, and time.      Cranial Nerves: No cranial nerve deficit.      Sensory: No sensory deficit.      Gait: Gait normal.      Comments: Neurologic-no nystagmus, wide-based gait, tandem gait not attempted, Romberg unsteady, tandem Romberg falls to the left with eyes open   Psychiatric:         Speech: Speech normal.         Behavior: Behavior normal. Behavior is cooperative.         Thought Content: Thought content normal.         Judgment: Judgment normal.         Flexible video nasolaryngoscopy:  Nasal septal deviation and nasal changes of rhinitis; laryngeal changes consistent with laryngal pharyngeal reflux that is improving significantly on H2 agonist therapy    Laryngeal pictures from 11/14/2024:                  Nasopharynx/torus tubarius pictures:              Right nasal passage pictures:          Left nasal passage pictures:                Assessment:       1. Impaired functional mobility, balance, and endurance    2. Vertigo of central origin    3. Laryngopharyngeal reflux (LPR)    4. Allergic rhinitis, unspecified seasonality, unspecified trigger    5. History of intracranial hemorrhage    6. Chronic throat clearing    7. Hoarseness    8. Nasal septal deviation    9. Dysphagia, unspecified type            Plan:       I  will have the patient continue with her famotidine 20 mg twice daily in her allergy medications.  The patient will require ongoing ENT evaluation and care for her chronic imbalance secondary to central vestibular abnormalities, swallowing and throat  complaints related to laryngal pharyngeal reflux and sinonasal allergy problems for the rest of her life.  I had a long discussion with the patient and her  regarding the importance of using her cane to assist her when walking to avoid any falls.  I emphasize that she has been extremely fortunate that she has not sustained any significant severe injury that would lead her either bed-bound or wheelchair bound.  She will continue with her present drug regimen and I will recheck her in 3 months.                     DISCLAIMER: This note was prepared with [employer] voice recognition transcription software. Garbled syntax, mangled pronouns, and other bizarre constructions may be attributed to that software system. While efforts were made to correct any mistakes made by this voice recognition program, some errors and/or omissions may remain in the note that were missed when the note was originally created.

## 2025-02-16 NOTE — PROCEDURES
"Laryngoscopy    Date/Time: 2/14/2025 1:40 PM    Performed by: ДМИТРИЙ Macdonald MD  Authorized by: ДМИТРИЙ Macdonald MD    Time out: Immediately prior to procedure a "time out" was called to verify the correct patient, procedure, equipment, support staff and site/side marked as required.    Consent Done?:  Yes (Verbal)  Anesthesia:     Local anesthetic:  4% Xylocaine spray with Juan-Synephrine    Patient tolerance:  Patient tolerated the procedure well with no immediate complications    Decongestion performed?: Yes    Laryngoscopy:     Areas examined:  Larynx, hypopharynx, oropharynx, nasopharynx, nasal cavities and vocal cords    Laryngoscope size:  4 mm (Flexible video nasolaryngoscopy)  Nose External:      No external nasal deformity  Nose Intranasal:      Mucosa no polyps     Mucosa ulcers not present     No mucosa lesions present (Profuse clear mucus in the nasal passages bilaterally)     Septum gross deformity (Nasal septum deviated to the left)     Enlarged turbinates  Nasopharynx:      No mucosa lesions     Adenoids not present     Posterior choanae patent     Eustachian tube patent  Larynx/hypopharynx:      No epiglottis lesions     No epiglottis edema     No AE folds lesions     No vocal cord polyps     Equal and normal bilateral (vocal cords move symmetrically, no mucosal lesions)     No hypopharynx lesions     No piriform sinus pooling     No piriform sinus lesions     Post cricoid edema (minimal to mild, improved from last endoscopy)     Post cricoid erythema (minimal to mild, improved from last endoscopy)     Flexible video nasolaryngoscopy-nasal septal deviation and nasal changes allergic rhinitis; laryngeal changes consistent with laryngal pharyngeal reflux that has continued to improve with H2 agonist therapy.    "

## 2025-03-31 ENCOUNTER — HOSPITAL ENCOUNTER (OUTPATIENT)
Dept: RADIOLOGY | Facility: HOSPITAL | Age: 70
Discharge: HOME | End: 2025-03-31
Attending: FAMILY MEDICINE
Payer: MEDICARE

## 2025-03-31 VITALS — HEIGHT: 63 IN | WEIGHT: 111 LBS | BODY MASS INDEX: 19.67 KG/M2

## 2025-03-31 DIAGNOSIS — Z12.31 ENCOUNTER FOR SCREENING MAMMOGRAM FOR BREAST CANCER: ICD-10-CM

## 2025-03-31 PROCEDURE — 77063 BREAST TOMOSYNTHESIS BI: CPT | Mod: 26,,, | Performed by: RADIOLOGY

## 2025-03-31 PROCEDURE — 77063 BREAST TOMOSYNTHESIS BI: CPT | Mod: TC

## 2025-03-31 PROCEDURE — 77067 SCR MAMMO BI INCL CAD: CPT | Mod: 26,,, | Performed by: RADIOLOGY

## 2025-05-14 DIAGNOSIS — Z78.0 MENOPAUSE: ICD-10-CM

## 2025-05-15 ENCOUNTER — OFFICE VISIT (OUTPATIENT)
Dept: OTOLARYNGOLOGY | Facility: CLINIC | Age: 70
End: 2025-05-15
Payer: COMMERCIAL

## 2025-05-15 VITALS — BODY MASS INDEX: 16.27 KG/M2 | WEIGHT: 91.81 LBS | SYSTOLIC BLOOD PRESSURE: 159 MMHG | DIASTOLIC BLOOD PRESSURE: 71 MMHG

## 2025-05-15 DIAGNOSIS — R26.89 BALANCE DISORDER: Primary | ICD-10-CM

## 2025-05-15 DIAGNOSIS — J34.2 NASAL SEPTAL DEVIATION: ICD-10-CM

## 2025-05-15 DIAGNOSIS — H81.4 VERTIGO OF CENTRAL ORIGIN: ICD-10-CM

## 2025-05-15 DIAGNOSIS — Z86.79 HISTORY OF INTRACRANIAL HEMORRHAGE: ICD-10-CM

## 2025-05-15 DIAGNOSIS — R09.89 CHRONIC THROAT CLEARING: ICD-10-CM

## 2025-05-15 DIAGNOSIS — K21.9 LARYNGOPHARYNGEAL REFLUX (LPR): ICD-10-CM

## 2025-05-15 DIAGNOSIS — J30.9 ALLERGIC RHINITIS, UNSPECIFIED SEASONALITY, UNSPECIFIED TRIGGER: ICD-10-CM

## 2025-05-16 NOTE — PROGRESS NOTES
Subjective:       Patient ID: Shira Vega is a 69 y.o. female.    Chief Complaint: Follow-up      The patient is coming in for follow-up visit.  It has been 3 months since I last saw the patient.  There are multiple issues to discuss:  1. Laryngal pharyngeal reflux:  The patient continues to have frequent throat clearing and pill dysphagia to larger pills.  She is not having globus sensation.  She does frequently cough and have to clear her throat after drinking clear thin liquids.  She does not having issues with semi-solid or solid foods.  She is taking famotidine twice daily.    2. Allergic rhinitis:  The patient is having relatively mild sinonasal allergy symptoms.  Nasal secretions are clear.  She uses Flonase daily and supplements with loratadine when needed.    3. Chronic imbalance/central vestibular disorder:  The patient continues having chronic imbalance; however, she has been doing physical therapy on a regular once a week basis and finds that it has helped with her balance.  She has started to use a cane more often and not and always when she is outside.     4. History traumatic brain injury:  The patient continues to see a neurologist.   Overall, she continues to slowly improve in all parameters.          Review of Systems     Constitutional: Positive for fatigue.  Negative for appetite change, chills, fever and unexpected weight loss.      HENT: Positive for facial swelling, postnasal drip, runny nose, sinus pressure, sore throat and stuffy nose.  Negative for ear discharge, ear infection, ear pain, hearing loss, mouth sores, nosebleeds, ringing in the ears, sinus infection, tonsil infection, dental problems, trouble swallowing and voice change.      Eyes:  Negative for change in eyesight, eye drainage, eye itching and photophobia.     Respiratory:  Positive for cough. Negative for shortness of breath, sleep apnea, snoring and wheezing.  Choking episodes    Cardiovascular:  Negative for chest pain,  foot swelling, irregular heartbeat and swollen veins.     Gastrointestinal:  Negative for abdominal pain, acid reflux, constipation, diarrhea, heartburn and vomiting.     Genitourinary: Negative for difficulty urinating, sexual problems and frequent urination.     Musc: Positive for aching muscles. Negative for aching joints, back pain and neck pain.     Skin: Negative for rash.     Allergy: Positive for seasonal allergies. Negative for food allergies.     Endocrine: Negative for cold intolerance and heat intolerance.      Neurological: Positive for dizziness, headaches and light-headedness. Negative for seizures and tremors.  Weakness  Word recall difficulties, improved from last visit    Hematologic: Negative for bruises/bleeds easily and swollen glands.      Psychiatric: Positive for decreased concentration. Negative for depression, nervous/anxious and sleep disturbance.                Objective:      Physical Exam  Vitals and nursing note reviewed. Exam conducted with a chaperone present (Patient's  accompanies her to the visit).   Constitutional:       General: She is awake.      Appearance: Normal appearance. She is well-developed, well-groomed and normal weight. She is not ill-appearing.   HENT:      Head: Normocephalic.      Jaw: There is normal jaw occlusion. No trismus, tenderness, swelling, pain on movement or malocclusion.      Salivary Glands: Right salivary gland is not diffusely enlarged or tender. Left salivary gland is not diffusely enlarged or tender.        Comments:       Right Ear: Hearing, ear canal and external ear normal. Tympanic membrane is not retracted.      Left Ear: Hearing, ear canal and external ear normal. Tympanic membrane is not retracted.      Nose: Septal deviation (to the left), mucosal edema (cyanotic, boggy inferior turbinates bilaterally) and rhinorrhea (clear mucus bilaterally) present. No nasal deformity. Rhinorrhea is clear.      Right Turbinates: Enlarged and pale.       Left Turbinates: Enlarged and pale.      Right Sinus: No maxillary sinus tenderness or frontal sinus tenderness.      Left Sinus: No maxillary sinus tenderness or frontal sinus tenderness.      Mouth/Throat:      Lips: No lesions.      Mouth: Mucous membranes are moist. No oral lesions.      Dentition: No gum lesions.      Tongue: No lesions.      Palate: No mass and lesions.      Pharynx: Oropharynx is clear. Uvula midline.      Comments: Voice-minimally weak with poor projection, no raspiness  Eyes:      General: Lids are normal.         Right eye: No discharge.         Left eye: No discharge.      Conjunctiva/sclera:      Right eye: Right conjunctiva is injected. No exudate.     Left eye: Left conjunctiva is injected. No exudate.     Pupils: Pupils are equal, round, and reactive to light.   Neck:      Thyroid: No thyroid mass or thyromegaly.      Trachea: Trachea normal. No tracheal deviation.   Cardiovascular:      Rate and Rhythm: Normal rate and regular rhythm.      Pulses: Normal pulses.      Heart sounds: Normal heart sounds.   Pulmonary:      Effort: Pulmonary effort is normal.      Breath sounds: Normal breath sounds. No stridor. No decreased breath sounds, wheezing, rhonchi or rales.   Abdominal:      General: Bowel sounds are normal.      Palpations: Abdomen is soft.      Tenderness: There is no abdominal tenderness.   Musculoskeletal:         General: Normal range of motion.      Cervical back: Normal range of motion. No muscular tenderness.   Lymphadenopathy:      Head:      Right side of head: No submental, submandibular, preauricular, posterior auricular or occipital adenopathy.      Left side of head: No submental, submandibular, preauricular, posterior auricular or occipital adenopathy.      Cervical: No cervical adenopathy.   Skin:     General: Skin is warm and dry.      Findings: No bruising (fine white here in the perioral area, chin and cheeks bilaterally), petechiae or rash.      Nails:  There is no clubbing.   Neurological:      Mental Status: She is alert and oriented to person, place, and time.      Cranial Nerves: No cranial nerve deficit.      Sensory: No sensory deficit.      Gait: Gait normal.      Comments: Neurologic-no nystagmus, wide-based gait, tandem gait not attempted, Romberg unsteady, tandem Romberg falls to the left with eyes open   Psychiatric:         Speech: Speech normal.         Behavior: Behavior normal. Behavior is cooperative.         Thought Content: Thought content normal.         Judgment: Judgment normal.                  Assessment:       1. Balance disorder    2. Vertigo of central origin    3. History of intracranial hemorrhage    4. Laryngopharyngeal reflux (LPR)    5. Chronic throat clearing    6. Allergic rhinitis, unspecified seasonality, unspecified trigger    7. Nasal septal deviation              Plan:       I  will have the patient continue with her famotidine 20 mg twice daily in her allergy medications.  The patient will require ongoing ENT evaluation and care for her chronic imbalance secondary to central vestibular abnormalities, swallowing and throat complaints related to laryngal pharyngeal reflux and sinonasal allergy problems for the rest of her life.  I praised the patient for starting to use a cane on a regular basis.  She will continue with the other medications and recheck here in 3 months.                DISCLAIMER: This note was prepared with [employer] voice recognition transcription software. Garbled syntax, mangled pronouns, and other bizarre constructions may be attributed to that software system. While efforts were made to correct any mistakes made by this voice recognition program, some errors and/or omissions may remain in the note that were missed when the note was originally created.

## 2025-06-09 NOTE — TELEPHONE ENCOUNTER
Care Due:                  Date            Visit Type   Department     Provider  --------------------------------------------------------------------------------                                MYCHART                              ANNUAL                              CHECKUP/PHY  [facility]  Last Visit: 05-      S            PRASHANT Price  Next Visit: None Scheduled  None         None Found                                                            Last  Test          Frequency    Reason                     Performed    Due Date  --------------------------------------------------------------------------------    Office Visit  15 months..  levothyroxine,             05- 08-                             pravastatin..............    CMP.........  12 months..  pravastatin..............  05-   05-    Lipid Panel.  12 months..  pravastatin..............  01- 01-    TSH.........  12 months..  levothyroxine............  05-   05-    [facility] Embedded Care Due Messages. Reference number: [id number].   6/09/2025 3:29:20 PM CDT

## 2025-06-10 NOTE — TELEPHONE ENCOUNTER
Refill Routing Note   Medication(s) are not appropriate for processing by [facility] for the following reason(s):        Required labs outdated    [facility] action(s):  Defer   Requires appointment : Yes   Requires labs : Yes             Appointments  past 12m or future 3m with PCP    Date Provider   Last Visit   5/14/2024 Patti Price MD   Next Visit   Visit date not found Patti Price MD   ED visits in past 90 days: 0        Note composed:9:40 PM 06/09/2025

## 2025-06-12 RX ORDER — PRAVASTATIN SODIUM 20 MG/1
TABLET ORAL
Qty: 90 TABLET | Refills: 0 | Status: SHIPPED | OUTPATIENT
Start: 2025-06-12

## 2025-06-23 DIAGNOSIS — Z00.00 ENCOUNTER FOR MEDICARE ANNUAL WELLNESS EXAM: ICD-10-CM

## 2025-08-15 ENCOUNTER — OFFICE VISIT (OUTPATIENT)
Dept: OTOLARYNGOLOGY | Facility: CLINIC | Age: 70
End: 2025-08-15
Payer: MEDICARE

## 2025-08-15 VITALS — DIASTOLIC BLOOD PRESSURE: 67 MMHG | HEART RATE: 87 BPM | SYSTOLIC BLOOD PRESSURE: 140 MMHG | OXYGEN SATURATION: 98 %

## 2025-08-15 DIAGNOSIS — H81.4 VERTIGO OF CENTRAL ORIGIN: ICD-10-CM

## 2025-08-15 DIAGNOSIS — Z86.79 HISTORY OF INTRACRANIAL HEMORRHAGE: ICD-10-CM

## 2025-08-15 DIAGNOSIS — Z74.09 IMPAIRED FUNCTIONAL MOBILITY, BALANCE, AND ENDURANCE: ICD-10-CM

## 2025-08-15 DIAGNOSIS — K21.9 LARYNGOPHARYNGEAL REFLUX (LPR): ICD-10-CM

## 2025-08-15 DIAGNOSIS — R26.89 BALANCE DISORDER: Primary | ICD-10-CM

## 2025-08-15 DIAGNOSIS — R09.89 CHRONIC THROAT CLEARING: ICD-10-CM

## 2025-08-15 DIAGNOSIS — R13.10 DYSPHAGIA, UNSPECIFIED TYPE: ICD-10-CM

## 2025-08-15 DIAGNOSIS — J30.9 ALLERGIC RHINITIS, UNSPECIFIED SEASONALITY, UNSPECIFIED TRIGGER: ICD-10-CM

## 2025-08-15 DIAGNOSIS — J34.2 NASAL SEPTAL DEVIATION: ICD-10-CM

## 2025-08-15 DIAGNOSIS — H61.23 BILATERAL IMPACTED CERUMEN: ICD-10-CM

## 2025-08-15 PROCEDURE — 99213 OFFICE O/P EST LOW 20 MIN: CPT | Mod: PBBFAC,PN,25 | Performed by: SPECIALIST

## 2025-08-15 PROCEDURE — 99999 PR PBB SHADOW E&M-EST. PATIENT-LVL III: CPT | Mod: PBBFAC,,, | Performed by: SPECIALIST

## 2025-08-15 PROCEDURE — 69210 REMOVE IMPACTED EAR WAX UNI: CPT | Mod: PBBFAC,PN | Performed by: SPECIALIST

## 2025-08-15 RX ORDER — FLUTICASONE PROPIONATE 50 MCG
2 SPRAY, SUSPENSION (ML) NASAL DAILY
Qty: 54.6 ML | Refills: 3 | Status: SHIPPED | OUTPATIENT
Start: 2025-08-15

## 2025-08-15 RX ORDER — FAMOTIDINE 20 MG/1
20 TABLET, FILM COATED ORAL 2 TIMES DAILY
Qty: 180 TABLET | Refills: 3 | Status: SHIPPED | OUTPATIENT
Start: 2025-08-15